# Patient Record
Sex: FEMALE | Race: ASIAN
[De-identification: names, ages, dates, MRNs, and addresses within clinical notes are randomized per-mention and may not be internally consistent; named-entity substitution may affect disease eponyms.]

---

## 2022-01-27 ENCOUNTER — TRANSCRIPTION ENCOUNTER (OUTPATIENT)
Age: 62
End: 2022-01-27

## 2022-03-10 ENCOUNTER — TRANSCRIPTION ENCOUNTER (OUTPATIENT)
Age: 62
End: 2022-03-10

## 2022-09-30 PROBLEM — Z00.00 ENCOUNTER FOR PREVENTIVE HEALTH EXAMINATION: Status: ACTIVE | Noted: 2022-09-30

## 2022-11-03 ENCOUNTER — APPOINTMENT (OUTPATIENT)
Dept: GASTROENTEROLOGY | Facility: CLINIC | Age: 62
End: 2022-11-03

## 2022-11-17 ENCOUNTER — APPOINTMENT (OUTPATIENT)
Dept: GASTROENTEROLOGY | Facility: CLINIC | Age: 62
End: 2022-11-17

## 2022-11-17 VITALS — WEIGHT: 156 LBS | BODY MASS INDEX: 27.64 KG/M2 | HEIGHT: 63 IN

## 2022-11-17 DIAGNOSIS — R14.3 FLATULENCE: ICD-10-CM

## 2022-11-17 PROCEDURE — 99204 OFFICE O/P NEW MOD 45 MIN: CPT

## 2022-11-17 NOTE — PHYSICAL EXAM
[Hearing Threshold Finger Rub Not [place]] : hearing was normal [Normal Lips/Gums] : the lips and gums were normal [Normal Appearance] : the appearance of the neck was normal [Heart Rate And Rhythm] : heart rate was normal and rhythm regular [Normal S1, S2] : normal S1 and S2 [Normal] : normal bowel sounds, non-tender, no masses, soft, no no hepato-splenomegaly [Abnormal Walk] : normal gait [Normal Color / Pigmentation] : normal skin color and pigmentation [Cranial Nerves Intact] : cranial nerves 2-12 were intact [No Focal Deficits] : no focal deficits [Oriented To Time, Place, And Person] : oriented to person, place, and time

## 2022-11-17 NOTE — HISTORY OF PRESENT ILLNESS
[FreeTextEntry1] : January 2022 Dr. Ospina: 2 mm polyps in the cecum, hepatic flexure, transverse, and sigmoid colon.  Diverticulosis.\par Pathology revealed tubular adenomas.

## 2022-11-17 NOTE — ASSESSMENT
[FreeTextEntry1] : 62 y.o F w/ HTN, DM, DL here for evaluation for dilated PD noted on MRI.\par \par #Dilated PD/PD stricture\par Concern for pancreatic mass\par -Check routine labs including CBC, CMP, INR\par -We will schedule EUS for further evaluation\par -If patient's labs suggest obstruction, may consider ERCP at the time\par \par #Bloating/belching -nonspecific symptoms\par Mild intermittent symptoms that are occasional\par Patient denied those symptoms at the time of the exam\par -We will further evaluate with EGD at the time of the procedure\par -Routine lab\par \par Follow-up after procedure

## 2022-12-04 LAB
ALBUMIN SERPL ELPH-MCNC: 4.9 G/DL
ALP BLD-CCNC: 89 U/L
ALT SERPL-CCNC: 41 U/L
ANION GAP SERPL CALC-SCNC: 14 MMOL/L
AST SERPL-CCNC: 24 U/L
BASOPHILS # BLD AUTO: 0.03 K/UL
BASOPHILS NFR BLD AUTO: 0.6 %
BILIRUB SERPL-MCNC: 0.6 MG/DL
BUN SERPL-MCNC: 13 MG/DL
CALCIUM SERPL-MCNC: 10.1 MG/DL
CHLORIDE SERPL-SCNC: 101 MMOL/L
CO2 SERPL-SCNC: 24 MMOL/L
CREAT SERPL-MCNC: 0.7 MG/DL
EGFR: 98 ML/MIN/1.73M2
EOSINOPHIL # BLD AUTO: 0.13 K/UL
EOSINOPHIL NFR BLD AUTO: 2.5 %
GLUCOSE SERPL-MCNC: 341 MG/DL
HCT VFR BLD CALC: 38.1 %
HGB BLD-MCNC: 13.2 G/DL
IMM GRANULOCYTES NFR BLD AUTO: 0.4 %
INR PPP: 0.94 RATIO
LYMPHOCYTES # BLD AUTO: 1.99 K/UL
LYMPHOCYTES NFR BLD AUTO: 38.9 %
MAN DIFF?: NORMAL
MCHC RBC-ENTMCNC: 31.4 PG
MCHC RBC-ENTMCNC: 34.6 G/DL
MCV RBC AUTO: 90.5 FL
MONOCYTES # BLD AUTO: 0.28 K/UL
MONOCYTES NFR BLD AUTO: 5.5 %
NEUTROPHILS # BLD AUTO: 2.67 K/UL
NEUTROPHILS NFR BLD AUTO: 52.1 %
PLATELET # BLD AUTO: 221 K/UL
POTASSIUM SERPL-SCNC: 3.9 MMOL/L
PROT SERPL-MCNC: 7.3 G/DL
PT BLD: 10.7 SEC
RBC # BLD: 4.21 M/UL
RBC # FLD: 11.9 %
SODIUM SERPL-SCNC: 139 MMOL/L
WBC # FLD AUTO: 5.12 K/UL

## 2022-12-10 ENCOUNTER — LABORATORY RESULT (OUTPATIENT)
Age: 62
End: 2022-12-10

## 2022-12-14 ENCOUNTER — TRANSCRIPTION ENCOUNTER (OUTPATIENT)
Age: 62
End: 2022-12-14

## 2022-12-23 ENCOUNTER — TRANSCRIPTION ENCOUNTER (OUTPATIENT)
Age: 62
End: 2022-12-23

## 2022-12-23 ENCOUNTER — RESULT REVIEW (OUTPATIENT)
Age: 62
End: 2022-12-23

## 2022-12-23 ENCOUNTER — OUTPATIENT (OUTPATIENT)
Dept: OUTPATIENT SERVICES | Facility: HOSPITAL | Age: 62
LOS: 1 days | Discharge: HOME | End: 2022-12-23
Payer: COMMERCIAL

## 2022-12-23 VITALS
HEART RATE: 71 BPM | SYSTOLIC BLOOD PRESSURE: 135 MMHG | TEMPERATURE: 98 F | DIASTOLIC BLOOD PRESSURE: 94 MMHG | RESPIRATION RATE: 18 BRPM | HEIGHT: 62.99 IN | WEIGHT: 139.99 LBS

## 2022-12-23 VITALS — OXYGEN SATURATION: 98 %

## 2022-12-23 PROCEDURE — 88312 SPECIAL STAINS GROUP 1: CPT | Mod: 26

## 2022-12-23 PROCEDURE — 88172 CYTP DX EVAL FNA 1ST EA SITE: CPT | Mod: 26

## 2022-12-23 PROCEDURE — 43239 EGD BIOPSY SINGLE/MULTIPLE: CPT | Mod: XU

## 2022-12-23 PROCEDURE — 88173 CYTOPATH EVAL FNA REPORT: CPT | Mod: 26

## 2022-12-23 PROCEDURE — 88305 TISSUE EXAM BY PATHOLOGIST: CPT | Mod: 26

## 2022-12-23 PROCEDURE — 43238 EGD US FINE NEEDLE BX/ASPIR: CPT

## 2022-12-23 NOTE — ASU DISCHARGE PLAN (ADULT/PEDIATRIC) - NS MD DC FALL RISK RISK
For information on Fall & Injury Prevention, visit: [email or web address] OR  [email or web address] OR  https://www.cdc.gov/steadi/patient.html

## 2022-12-23 NOTE — ASU PATIENT PROFILE, ADULT - FALL HARM RISK - UNIVERSAL INTERVENTIONS
Bed in lowest position, wheels locked, appropriate side rails in place/Call bell, personal items and telephone in reach/Instruct patient to call for assistance before getting out of bed or chair/Non-slip footwear when patient is out of bed/[place] to call system/Physically safe environment - no spills, clutter or unnecessary equipment/Purposeful Proactive Rounding/Room/bathroom lighting operational, light cord in reach

## 2022-12-23 NOTE — CHART NOTE - NSCHARTNOTEFT_GEN_A_CORE
PACU ANESTHESIA ADMISSION NOTE      Procedure:   Post op diagnosis:      ____  Intubated  TV:______       Rate: ______      FiO2: ______    _x___  Patent Airway    _x___  Full return of protective reflexes    _x___  Full recovery from anesthesia / back to baseline status    Vitals:    BP  123/56  P  82  R  14  Sat  99    Mental Status:  _x___ Awake   _____ Alert   _____ Drowsy   _____ Sedated    Nausea/Vomiting:  _x___  NO       ______Yes,   See Post - Op Orders         Pain Scale (0-10):  __0___    Treatment: _x___ None    ____ See Post - Op/PCA Orders    Post - Operative Fluids:   __x__ Oral   ____ See Post - Op Orders    Plan: Discharge:   _x___Home       _____Floor     _____Critical Care    _____  Other:_________________    Comments:  No anesthesia issues or complications noted.  Discharge when criteria met.

## 2022-12-28 LAB — SURGICAL PATHOLOGY STUDY: SIGNIFICANT CHANGE UP

## 2022-12-30 LAB — NON-GYNECOLOGICAL CYTOLOGY STUDY: SIGNIFICANT CHANGE UP

## 2023-01-03 DIAGNOSIS — Z79.84 LONG TERM (CURRENT) USE OF ORAL HYPOGLYCEMIC DRUGS: ICD-10-CM

## 2023-01-03 DIAGNOSIS — K86.89 OTHER SPECIFIED DISEASES OF PANCREAS: ICD-10-CM

## 2023-01-03 DIAGNOSIS — K29.50 UNSPECIFIED CHRONIC GASTRITIS WITHOUT BLEEDING: ICD-10-CM

## 2023-01-03 DIAGNOSIS — E78.00 PURE HYPERCHOLESTEROLEMIA, UNSPECIFIED: ICD-10-CM

## 2023-01-03 DIAGNOSIS — I10 ESSENTIAL (PRIMARY) HYPERTENSION: ICD-10-CM

## 2023-01-03 DIAGNOSIS — E11.9 TYPE 2 DIABETES MELLITUS WITHOUT COMPLICATIONS: ICD-10-CM

## 2023-01-26 ENCOUNTER — TRANSCRIPTION ENCOUNTER (OUTPATIENT)
Age: 63
End: 2023-01-26

## 2023-01-26 ENCOUNTER — APPOINTMENT (OUTPATIENT)
Dept: GASTROENTEROLOGY | Facility: CLINIC | Age: 63
End: 2023-01-26
Payer: COMMERCIAL

## 2023-01-26 PROCEDURE — 99213 OFFICE O/P EST LOW 20 MIN: CPT | Mod: 95

## 2023-01-28 NOTE — HISTORY OF PRESENT ILLNESS
[Home] : at home, [unfilled] , at the time of the visit. [Medical Office: ([facility])___] : at the medical office located in  [Verbal consent obtained from patient] : the patient, [unfilled] [FreeTextEntry4] : SANIA IBANEZ [FreeTextEntry1] : January 2022 Dr. Ospina: 2 mm polyps in the cecum, hepatic flexure, transverse, and sigmoid colon.  Diverticulosis.\par Pathology revealed tubular adenomas.

## 2023-01-28 NOTE — ASSESSMENT
[FreeTextEntry1] : 62 y.o F w/ HTN, DM, DL here for evaluation for dilated PD noted on MRI.\par \par She is status post EUS on 12/23/2022 revealing a solid lesion measuring around 10 x 8.2 mm at the pancreatic genu abutting the PD resulting in extrinsic compression and stricture with upstream PD dilation in the body and tail of pancreas.  There was a solid intraductal hyperechoic nodule measuring 6.4 x 6.4 mm.\par The lesion was sampled using a 22-gauge FNB and pathology showed atypical ductal epithelium.\par \par #Dilated PD/PD stricture\par #pancreatic lesion\par ddx include main duct intraductal pancreatic mucinous neoplasm (MD-IPMN) vs. pancreatic intraepithelial neoplasia (PanIN) vs less likely MCN\par \par pt has dilated PD to 4 mm w/ distal pancreatic atrophy and a mural nodule >5 mm. in addition, changes of PD caliber suggestive of PD involvement, as well atypical cytology findings are worrisome features and per Fukuoka criteria, would warrant surgical evaluation. \par \par -refer to surgery for further eval\par -advised about pre-malignant etiology and explained risks of progression alex given concern for MD-IPMN\par \par #Bloating/belching -nonspecific symptoms\par Mild intermittent symptoms that are occasional\par EGD was unremarkable\par Patient denied those symptoms today\par \par Follow-up after surgery eval

## 2023-01-28 NOTE — PHYSICAL EXAM
[Hearing Threshold Finger Rub Not [place]] : hearing was normal [Normal Lips/Gums] : the lips and gums were normal [Normal Appearance] : the appearance of the neck was normal [Heart Rate And Rhythm] : heart rate was normal and rhythm regular [Normal S1, S2] : normal S1 and S2 [Normal] : normal bowel sounds, non-tender, no masses, soft, no no hepato-splenomegaly [Abnormal Walk] : normal gait [Normal Color / Pigmentation] : normal skin color and pigmentation [Cranial Nerves Intact] : cranial nerves 2-12 were intact [No Focal Deficits] : no focal deficits [Oriented To Time, Place, And Person] : oriented to person, place, and time

## 2023-02-03 ENCOUNTER — APPOINTMENT (OUTPATIENT)
Dept: SURGERY | Facility: CLINIC | Age: 63
End: 2023-02-03
Payer: COMMERCIAL

## 2023-02-03 VITALS
BODY MASS INDEX: 27.29 KG/M2 | SYSTOLIC BLOOD PRESSURE: 130 MMHG | DIASTOLIC BLOOD PRESSURE: 90 MMHG | WEIGHT: 154 LBS | HEART RATE: 82 BPM | HEIGHT: 63 IN | OXYGEN SATURATION: 98 % | TEMPERATURE: 97.1 F

## 2023-02-03 PROCEDURE — 99204 OFFICE O/P NEW MOD 45 MIN: CPT

## 2023-02-03 NOTE — PHYSICAL EXAM
[JVD] : no jugular venous distention  [Purpura] : no purpura  [Alert] : alert [Calm] : calm [de-identified] : Normal  [de-identified] : Normal  [de-identified] : Normal

## 2023-02-03 NOTE — ASSESSMENT
[FreeTextEntry1] : Ms. JEREMIAS IVEY is a 62 year  year old woman. She presented to the office for the first time on 02/03/2023 , her primary is Doesnt have PCP .\par \par She had some pain, was worked up outpt in [place] and then had and EUS guided biopsy of the pancreas for a small mass in the the genu or the pancreas.\par the PD is dilated proximally \par \par We do not have have any imaging to review. Biopsy had atypical cells. \par \par impression: Main Duct IPMN - causing issues. \par \par Will get Pancreatic Protocol  CT \par Will schedule for PancreaticoDuodenectomy. \par \par We explained in great detail the pathophysiology of the disease process. We reyna diagrams and discussed the workup for diagnosis and management.\par The various options were explained to the patient. The Risk , benefit and alternatives were discussed. We discussed recovery and possible complications.\par The Post operative care was explained to the patient. She was counselled on diet , exercise and wound care.\par We discussed the pathology and surgery with her.\par \par We discussed for over 45 min . \par We discussed the importance of close follow up. \par We informed that she needs to follow up after CT. \par We also informed that she can call us if anything changes or has any questions.\par

## 2023-02-03 NOTE — HISTORY OF PRESENT ILLNESS
[de-identified] : Ms. JEREMIAS IVEY is a 62 year  year old woman. She presented to the office for the first time on 02/03/2023 , her primary is Doesnt have PCP .\par \par She had some pain, was worked up outpt in [place] and then had and EUS guided biopsy of the pancreas for a small mass in the the genu or the pancreas.\par the PD is dilated proximally \par We do not have have any imaging to review. Biopsy had atypical cells.

## 2023-02-07 ENCOUNTER — NON-APPOINTMENT (OUTPATIENT)
Age: 63
End: 2023-02-07

## 2023-02-16 ENCOUNTER — OUTPATIENT (OUTPATIENT)
Dept: OUTPATIENT SERVICES | Facility: HOSPITAL | Age: 63
LOS: 1 days | End: 2023-02-16
Payer: COMMERCIAL

## 2023-02-16 DIAGNOSIS — D49.0 NEOPLASM OF UNSPECIFIED BEHAVIOR OF DIGESTIVE SYSTEM: ICD-10-CM

## 2023-02-16 DIAGNOSIS — Z00.8 ENCOUNTER FOR OTHER GENERAL EXAMINATION: ICD-10-CM

## 2023-02-16 PROCEDURE — 74178 CT ABD&PLV WO CNTR FLWD CNTR: CPT

## 2023-02-16 PROCEDURE — 74178 CT ABD&PLV WO CNTR FLWD CNTR: CPT | Mod: 26

## 2023-02-17 DIAGNOSIS — D49.0 NEOPLASM OF UNSPECIFIED BEHAVIOR OF DIGESTIVE SYSTEM: ICD-10-CM

## 2023-02-27 ENCOUNTER — APPOINTMENT (OUTPATIENT)
Dept: SURGERY | Facility: CLINIC | Age: 63
End: 2023-02-27
Payer: COMMERCIAL

## 2023-02-27 PROCEDURE — 99215 OFFICE O/P EST HI 40 MIN: CPT

## 2023-02-27 NOTE — PHYSICAL EXAM
[Alert] : alert [Calm] : calm [JVD] : no jugular venous distention  [Purpura] : no purpura  [de-identified] : Normal  [de-identified] : Normal  [de-identified] : Normal

## 2023-02-27 NOTE — HISTORY OF PRESENT ILLNESS
[de-identified] : Ms. JEREMIAS IEVY is a 62 year  year old woman. She presented to the office for the first time on 02/03/2023 , her primary is Doesnt have PCP .\par \par She had some pain, was worked up outpt in [place] and then had and EUS guided biopsy of the pancreas for a small mass in the the genu or the pancreas.\par the PD is dilated proximally \par We do not have have any imaging to review. Biopsy had atypical cells. \par 2/27/23 ; pmd [facility] [id number]\par CT shows a smal complex mass in the head / uncinate as well as the neck \par sma smv looks good.

## 2023-02-27 NOTE — ASSESSMENT
[FreeTextEntry1] : Ms. JEREMIAS IVEY is a 62 year  year old woman. She presented to the office for the first time on 02/03/2023 , her primary is Doesnt have PCP .\par \par She had some pain, was worked up outpt in [place] and then had and EUS guided biopsy of the pancreas for a small mass in the the genu or the pancreas.\par the PD is dilated proximally \par We do not have have any imaging to review. Biopsy had atypical cells. \par 2/27/23 ; pmd [facility] [id number]\par CT shows a smal complex mass in the head / uncinate as well as the neck \par sma smv looks good. \par \par impression: Main Duct IPMN - causing issues. \par atypical cells\par \par Will schedule for PancreaticoDuodenectomy. \par \par We explained in great detail the pathophysiology of the disease process. We reyna diagrams and discussed the workup for diagnosis and management.\par The various options were explained to the patient. The Risk , benefit and alternatives were discussed. We discussed recovery and possible complications.\par The Post operative care was explained to the patient. She was counselled on diet , exercise and wound care.\par We discussed the pathology and surgery with her.\par \par We discussed for over 60 min . \par We discussed the importance of close follow up. \par We informed that she needs to follow up  in [place]\par We also informed that she can call us if anything changes or has any questions.\par

## 2023-03-13 ENCOUNTER — OUTPATIENT (OUTPATIENT)
Dept: OUTPATIENT SERVICES | Facility: HOSPITAL | Age: 63
LOS: 1 days | End: 2023-03-13
Payer: COMMERCIAL

## 2023-03-13 ENCOUNTER — RESULT REVIEW (OUTPATIENT)
Age: 63
End: 2023-03-13

## 2023-03-13 VITALS
HEART RATE: 75 BPM | OXYGEN SATURATION: 97 % | SYSTOLIC BLOOD PRESSURE: 158 MMHG | DIASTOLIC BLOOD PRESSURE: 76 MMHG | RESPIRATION RATE: 15 BRPM | HEIGHT: 62 IN | TEMPERATURE: 99 F | WEIGHT: 154.54 LBS

## 2023-03-13 DIAGNOSIS — K86.89 OTHER SPECIFIED DISEASES OF PANCREAS: ICD-10-CM

## 2023-03-13 DIAGNOSIS — Z01.818 ENCOUNTER FOR OTHER PREPROCEDURAL EXAMINATION: ICD-10-CM

## 2023-03-13 LAB
A1C WITH ESTIMATED AVERAGE GLUCOSE RESULT: 7.1 % — HIGH (ref 4–5.6)
ALBUMIN SERPL ELPH-MCNC: 4.8 G/DL — SIGNIFICANT CHANGE UP (ref 3.5–5.2)
ALP SERPL-CCNC: 79 U/L — SIGNIFICANT CHANGE UP (ref 30–115)
ALT FLD-CCNC: 52 U/L — HIGH (ref 0–41)
ANION GAP SERPL CALC-SCNC: 15 MMOL/L — HIGH (ref 7–14)
APTT BLD: 31.4 SEC — SIGNIFICANT CHANGE UP (ref 27–39.2)
AST SERPL-CCNC: 29 U/L — SIGNIFICANT CHANGE UP (ref 0–41)
BASOPHILS # BLD AUTO: 0.03 K/UL — SIGNIFICANT CHANGE UP (ref 0–0.2)
BASOPHILS NFR BLD AUTO: 0.6 % — SIGNIFICANT CHANGE UP (ref 0–1)
BILIRUB SERPL-MCNC: 0.9 MG/DL — SIGNIFICANT CHANGE UP (ref 0.2–1.2)
BLD GP AB SCN SERPL QL: SIGNIFICANT CHANGE UP
BUN SERPL-MCNC: 13 MG/DL — SIGNIFICANT CHANGE UP (ref 10–20)
CALCIUM SERPL-MCNC: 10 MG/DL — SIGNIFICANT CHANGE UP (ref 8.4–10.5)
CHLORIDE SERPL-SCNC: 106 MMOL/L — SIGNIFICANT CHANGE UP (ref 98–110)
CO2 SERPL-SCNC: 24 MMOL/L — SIGNIFICANT CHANGE UP (ref 17–32)
CREAT SERPL-MCNC: 0.7 MG/DL — SIGNIFICANT CHANGE UP (ref 0.7–1.5)
EGFR: 98 ML/MIN/1.73M2 — SIGNIFICANT CHANGE UP
EOSINOPHIL # BLD AUTO: 0.09 K/UL — SIGNIFICANT CHANGE UP (ref 0–0.7)
EOSINOPHIL NFR BLD AUTO: 1.8 % — SIGNIFICANT CHANGE UP (ref 0–8)
ESTIMATED AVERAGE GLUCOSE: 157 MG/DL — HIGH (ref 68–114)
GLUCOSE SERPL-MCNC: 128 MG/DL — HIGH (ref 70–99)
HCT VFR BLD CALC: 39.5 % — SIGNIFICANT CHANGE UP (ref 37–47)
HGB BLD-MCNC: 13.5 G/DL — SIGNIFICANT CHANGE UP (ref 12–16)
IMM GRANULOCYTES NFR BLD AUTO: 0.6 % — HIGH (ref 0.1–0.3)
INR BLD: 0.93 RATIO — SIGNIFICANT CHANGE UP (ref 0.65–1.3)
LYMPHOCYTES # BLD AUTO: 2.38 K/UL — SIGNIFICANT CHANGE UP (ref 1.2–3.4)
LYMPHOCYTES # BLD AUTO: 47.5 % — SIGNIFICANT CHANGE UP (ref 20.5–51.1)
MCHC RBC-ENTMCNC: 31 PG — SIGNIFICANT CHANGE UP (ref 27–31)
MCHC RBC-ENTMCNC: 34.2 G/DL — SIGNIFICANT CHANGE UP (ref 32–37)
MCV RBC AUTO: 90.6 FL — SIGNIFICANT CHANGE UP (ref 81–99)
MONOCYTES # BLD AUTO: 0.28 K/UL — SIGNIFICANT CHANGE UP (ref 0.1–0.6)
MONOCYTES NFR BLD AUTO: 5.6 % — SIGNIFICANT CHANGE UP (ref 1.7–9.3)
NEUTROPHILS # BLD AUTO: 2.2 K/UL — SIGNIFICANT CHANGE UP (ref 1.4–6.5)
NEUTROPHILS NFR BLD AUTO: 43.9 % — SIGNIFICANT CHANGE UP (ref 42.2–75.2)
NRBC # BLD: 0 /100 WBCS — SIGNIFICANT CHANGE UP (ref 0–0)
PLATELET # BLD AUTO: 251 K/UL — SIGNIFICANT CHANGE UP (ref 130–400)
POTASSIUM SERPL-MCNC: 4.6 MMOL/L — SIGNIFICANT CHANGE UP (ref 3.5–5)
POTASSIUM SERPL-SCNC: 4.6 MMOL/L — SIGNIFICANT CHANGE UP (ref 3.5–5)
PROT SERPL-MCNC: 7.6 G/DL — SIGNIFICANT CHANGE UP (ref 6–8)
PROTHROM AB SERPL-ACNC: 10.6 SEC — SIGNIFICANT CHANGE UP (ref 9.95–12.87)
RBC # BLD: 4.36 M/UL — SIGNIFICANT CHANGE UP (ref 4.2–5.4)
RBC # FLD: 11 % — LOW (ref 11.5–14.5)
SODIUM SERPL-SCNC: 145 MMOL/L — SIGNIFICANT CHANGE UP (ref 135–146)
WBC # BLD: 5.01 K/UL — SIGNIFICANT CHANGE UP (ref 4.8–10.8)
WBC # FLD AUTO: 5.01 K/UL — SIGNIFICANT CHANGE UP (ref 4.8–10.8)

## 2023-03-13 PROCEDURE — 93010 ELECTROCARDIOGRAM REPORT: CPT

## 2023-03-13 PROCEDURE — 85610 PROTHROMBIN TIME: CPT

## 2023-03-13 PROCEDURE — 99214 OFFICE O/P EST MOD 30 MIN: CPT | Mod: 25

## 2023-03-13 PROCEDURE — 71046 X-RAY EXAM CHEST 2 VIEWS: CPT | Mod: 26

## 2023-03-13 PROCEDURE — 93005 ELECTROCARDIOGRAM TRACING: CPT

## 2023-03-13 PROCEDURE — 36415 COLL VENOUS BLD VENIPUNCTURE: CPT

## 2023-03-13 PROCEDURE — 85025 COMPLETE CBC W/AUTO DIFF WBC: CPT

## 2023-03-13 PROCEDURE — 71046 X-RAY EXAM CHEST 2 VIEWS: CPT

## 2023-03-13 PROCEDURE — 86900 BLOOD TYPING SEROLOGIC ABO: CPT

## 2023-03-13 PROCEDURE — 83036 HEMOGLOBIN GLYCOSYLATED A1C: CPT

## 2023-03-13 PROCEDURE — 85730 THROMBOPLASTIN TIME PARTIAL: CPT

## 2023-03-13 PROCEDURE — 80053 COMPREHEN METABOLIC PANEL: CPT

## 2023-03-13 PROCEDURE — 86901 BLOOD TYPING SEROLOGIC RH(D): CPT

## 2023-03-13 PROCEDURE — 86850 RBC ANTIBODY SCREEN: CPT

## 2023-03-13 NOTE — H&P PST ADULT - HISTORY OF PRESENT ILLNESS
PT PRESENTS TO PAST WITH NO SOB, CP, PALPITATIONS, DYSURIA, UTI OR URI AT PRESENT.   PT ABLE TO WALK UP 2-3 FLIGHTS OF STEPS WITH NO SOB.  AS PER THE PT, THIS IS HIS/HER COMPLETE MEDICAL AND SURGICAL HX, INCLUDING MEDICATIONS PRESCRIBED AND OVER THE COUNTER  pt denies any covid s/s, YES   1/2023  tested positive in the past--PT IS AWARE OF DATE AND TIME OF COVID TESTING PRIOR TO DOP.  pt advised self quarantine till day of procedure  denies travel outside the [place] in the past 30 days  Anesthesia Alert  NO--Difficult Airway  NO--History of neck surgery or radiation  NO--Limited ROM of neck  NO--History of Malignant hyperthermia  NO--Personal or family history of Pseudocholinesterase deficiency  NO--Prior Anesthesia Complication  NO--Latex Allergy  NO--Loose teeth  NO--History of Rheumatoid Arthritis  NO--JUAN  NO BLEEDING RISK  NO--Other_____

## 2023-03-13 NOTE — H&P PST ADULT - REASON FOR ADMISSION
Proceduralist:  Karyn Blanco  Procedure: PANCREATICODUODENECTOMY AND ALL OTHER INDICATED PROCEDURE  Procedure: 480 Minutes  PT STATES--I HAVE A TUMOR IN MY PANCREASE.   THE PAIN IS 4/10.  THE PAIN COMES AND GOES. WHEN I TAKE ANTIBIOTICS THE PAIN GOES AWAY.  PT STATES-- I HAVE HAD NO PAIN FOR 6 MONTHS. Proceduralist:  Karyn Blanco  Procedure: PANCREATICODUODENECTOMY AND ALL OTHER INDICATED PROCEDURE  Procedure: 480 Minutes  PT STATES--I HAVE A TUMOR IN MY PANCREASE.   THE PAIN WAS  4/10. THE PAIN WAS A CRAMPING AND PRESSURE TYPE.    THE PAIN COMES AND GOES. WHEN I TAKE ANTIBIOTICS THE PAIN GOES AWAY.  PT STATES-- I HAVE HAD NO PAIN FOR 6 MONTHS.

## 2023-03-13 NOTE — H&P PST ADULT - NSICDXPASTMEDICALHX_GEN_ALL_CORE_FT
PAST MEDICAL HISTORY:  Diabetes     High cholesterol     HTN (hypertension)      PAST MEDICAL HISTORY:  COPD (chronic obstructive pulmonary disease)     Diabetes     High cholesterol     HTN (hypertension)     Kidney stones

## 2023-03-14 DIAGNOSIS — Z01.818 ENCOUNTER FOR OTHER PREPROCEDURAL EXAMINATION: ICD-10-CM

## 2023-03-14 DIAGNOSIS — K86.89 OTHER SPECIFIED DISEASES OF PANCREAS: ICD-10-CM

## 2023-03-31 ENCOUNTER — LABORATORY RESULT (OUTPATIENT)
Age: 63
End: 2023-03-31

## 2023-03-31 ENCOUNTER — NON-APPOINTMENT (OUTPATIENT)
Age: 63
End: 2023-03-31

## 2023-03-31 NOTE — ASU PATIENT PROFILE, ADULT - NSICDXPASTMEDICALHX_GEN_ALL_CORE_FT
PAST MEDICAL HISTORY:  COPD (chronic obstructive pulmonary disease)     Diabetes     High cholesterol     HTN (hypertension)     Kidney stones

## 2023-03-31 NOTE — ASU PATIENT PROFILE, ADULT - FALL HARM RISK - UNIVERSAL INTERVENTIONS
Bed in lowest position, wheels locked, appropriate side rails in place/Call bell, personal items and telephone in reach/Instruct patient to call for assistance before getting out of bed or chair/Non-slip footwear when patient is out of bed/[place] to call system/Physically safe environment - no spills, clutter or unnecessary equipment/Purposeful Proactive Rounding/Room/bathroom lighting operational, light cord in reach

## 2023-04-03 ENCOUNTER — APPOINTMENT (OUTPATIENT)
Dept: SURGERY | Facility: HOSPITAL | Age: 63
End: 2023-04-03

## 2023-04-03 ENCOUNTER — RESULT REVIEW (OUTPATIENT)
Age: 63
End: 2023-04-03

## 2023-04-03 ENCOUNTER — TRANSCRIPTION ENCOUNTER (OUTPATIENT)
Age: 63
End: 2023-04-03

## 2023-04-03 ENCOUNTER — INPATIENT (INPATIENT)
Facility: HOSPITAL | Age: 63
LOS: 4 days | Discharge: HOME HEALTH | DRG: 260 | End: 2023-04-08
Attending: SURGERY | Admitting: SURGERY
Payer: MEDICAID

## 2023-04-03 VITALS
OXYGEN SATURATION: 99 % | SYSTOLIC BLOOD PRESSURE: 135 MMHG | HEART RATE: 76 BPM | TEMPERATURE: 98 F | DIASTOLIC BLOOD PRESSURE: 78 MMHG | HEIGHT: 63 IN | RESPIRATION RATE: 15 BRPM | WEIGHT: 149.91 LBS

## 2023-04-03 DIAGNOSIS — K86.89 OTHER SPECIFIED DISEASES OF PANCREAS: ICD-10-CM

## 2023-04-03 LAB
ALBUMIN SERPL ELPH-MCNC: 4.2 G/DL — SIGNIFICANT CHANGE UP (ref 3.5–5.2)
ALP SERPL-CCNC: 62 U/L — SIGNIFICANT CHANGE UP (ref 30–115)
ALT FLD-CCNC: 63 U/L — HIGH (ref 0–41)
ANION GAP SERPL CALC-SCNC: 18 MMOL/L — HIGH (ref 7–14)
APTT BLD: 31.1 SEC — SIGNIFICANT CHANGE UP (ref 27–39.2)
AST SERPL-CCNC: 54 U/L — HIGH (ref 0–41)
BASOPHILS # BLD AUTO: 0.01 K/UL — SIGNIFICANT CHANGE UP (ref 0–0.2)
BASOPHILS NFR BLD AUTO: 0.1 % — SIGNIFICANT CHANGE UP (ref 0–1)
BILIRUB DIRECT SERPL-MCNC: 0.3 MG/DL — SIGNIFICANT CHANGE UP (ref 0–0.3)
BILIRUB INDIRECT FLD-MCNC: 0.8 MG/DL — SIGNIFICANT CHANGE UP (ref 0.2–1.2)
BILIRUB SERPL-MCNC: 1.1 MG/DL — SIGNIFICANT CHANGE UP (ref 0.2–1.2)
BUN SERPL-MCNC: 15 MG/DL — SIGNIFICANT CHANGE UP (ref 10–20)
CALCIUM SERPL-MCNC: 8.9 MG/DL — SIGNIFICANT CHANGE UP (ref 8.4–10.4)
CHLORIDE SERPL-SCNC: 103 MMOL/L — SIGNIFICANT CHANGE UP (ref 98–110)
CO2 SERPL-SCNC: 19 MMOL/L — SIGNIFICANT CHANGE UP (ref 17–32)
CREAT SERPL-MCNC: 0.9 MG/DL — SIGNIFICANT CHANGE UP (ref 0.7–1.5)
EGFR: 72 ML/MIN/1.73M2 — SIGNIFICANT CHANGE UP
EOSINOPHIL # BLD AUTO: 0 K/UL — SIGNIFICANT CHANGE UP (ref 0–0.7)
EOSINOPHIL NFR BLD AUTO: 0 % — SIGNIFICANT CHANGE UP (ref 0–8)
GLUCOSE BLDC GLUCOMTR-MCNC: 121 MG/DL — HIGH (ref 70–99)
GLUCOSE BLDC GLUCOMTR-MCNC: 167 MG/DL — HIGH (ref 70–99)
GLUCOSE BLDC GLUCOMTR-MCNC: 219 MG/DL — HIGH (ref 70–99)
GLUCOSE BLDC GLUCOMTR-MCNC: 222 MG/DL — HIGH (ref 70–99)
GLUCOSE BLDC GLUCOMTR-MCNC: 247 MG/DL — HIGH (ref 70–99)
GLUCOSE SERPL-MCNC: 245 MG/DL — HIGH (ref 70–99)
HCT VFR BLD CALC: 41.1 % — SIGNIFICANT CHANGE UP (ref 37–47)
HGB BLD-MCNC: 13.8 G/DL — SIGNIFICANT CHANGE UP (ref 12–16)
IMM GRANULOCYTES NFR BLD AUTO: 0.2 % — SIGNIFICANT CHANGE UP (ref 0.1–0.3)
INR BLD: 1.11 RATIO — SIGNIFICANT CHANGE UP (ref 0.65–1.3)
LACTATE SERPL-SCNC: 5.8 MMOL/L — CRITICAL HIGH (ref 0.7–2)
LYMPHOCYTES # BLD AUTO: 0.77 K/UL — LOW (ref 1.2–3.4)
LYMPHOCYTES # BLD AUTO: 8.1 % — LOW (ref 20.5–51.1)
MAGNESIUM SERPL-MCNC: 1.4 MG/DL — LOW (ref 1.8–2.4)
MCHC RBC-ENTMCNC: 31 PG — SIGNIFICANT CHANGE UP (ref 27–31)
MCHC RBC-ENTMCNC: 33.6 G/DL — SIGNIFICANT CHANGE UP (ref 32–37)
MCV RBC AUTO: 92.4 FL — SIGNIFICANT CHANGE UP (ref 81–99)
MONOCYTES # BLD AUTO: 0.56 K/UL — SIGNIFICANT CHANGE UP (ref 0.1–0.6)
MONOCYTES NFR BLD AUTO: 5.9 % — SIGNIFICANT CHANGE UP (ref 1.7–9.3)
NEUTROPHILS # BLD AUTO: 8.11 K/UL — HIGH (ref 1.4–6.5)
NEUTROPHILS NFR BLD AUTO: 85.7 % — HIGH (ref 42.2–75.2)
NRBC # BLD: 0 /100 WBCS — SIGNIFICANT CHANGE UP (ref 0–0)
PHOSPHATE SERPL-MCNC: 5.5 MG/DL — HIGH (ref 2.1–4.9)
PLATELET # BLD AUTO: 221 K/UL — SIGNIFICANT CHANGE UP (ref 130–400)
POTASSIUM SERPL-MCNC: 4.2 MMOL/L — SIGNIFICANT CHANGE UP (ref 3.5–5)
POTASSIUM SERPL-SCNC: 4.2 MMOL/L — SIGNIFICANT CHANGE UP (ref 3.5–5)
PROT SERPL-MCNC: 6.4 G/DL — SIGNIFICANT CHANGE UP (ref 6–8)
PROTHROM AB SERPL-ACNC: 12.7 SEC — SIGNIFICANT CHANGE UP (ref 9.95–12.87)
RBC # BLD: 4.45 M/UL — SIGNIFICANT CHANGE UP (ref 4.2–5.4)
RBC # FLD: 11.7 % — SIGNIFICANT CHANGE UP (ref 11.5–14.5)
SODIUM SERPL-SCNC: 140 MMOL/L — SIGNIFICANT CHANGE UP (ref 135–146)
WBC # BLD: 9.47 K/UL — SIGNIFICANT CHANGE UP (ref 4.8–10.8)
WBC # FLD AUTO: 9.47 K/UL — SIGNIFICANT CHANGE UP (ref 4.8–10.8)

## 2023-04-03 PROCEDURE — 36415 COLL VENOUS BLD VENIPUNCTURE: CPT

## 2023-04-03 PROCEDURE — 84484 ASSAY OF TROPONIN QUANT: CPT

## 2023-04-03 PROCEDURE — C9399: CPT

## 2023-04-03 PROCEDURE — C1889: CPT

## 2023-04-03 PROCEDURE — 88304 TISSUE EXAM BY PATHOLOGIST: CPT | Mod: 26

## 2023-04-03 PROCEDURE — 80076 HEPATIC FUNCTION PANEL: CPT

## 2023-04-03 PROCEDURE — 80048 BASIC METABOLIC PNL TOTAL CA: CPT

## 2023-04-03 PROCEDURE — 87075 CULTR BACTERIA EXCEPT BLOOD: CPT

## 2023-04-03 PROCEDURE — C2617: CPT

## 2023-04-03 PROCEDURE — 88304 TISSUE EXAM BY PATHOLOGIST: CPT

## 2023-04-03 PROCEDURE — 71045 X-RAY EXAM CHEST 1 VIEW: CPT

## 2023-04-03 PROCEDURE — 76998 US GUIDE INTRAOP: CPT | Mod: 26,59

## 2023-04-03 PROCEDURE — 93010 ELECTROCARDIOGRAM REPORT: CPT

## 2023-04-03 PROCEDURE — 87205 SMEAR GRAM STAIN: CPT

## 2023-04-03 PROCEDURE — 84100 ASSAY OF PHOSPHORUS: CPT

## 2023-04-03 PROCEDURE — 85610 PROTHROMBIN TIME: CPT

## 2023-04-03 PROCEDURE — 88309 TISSUE EXAM BY PATHOLOGIST: CPT | Mod: 26

## 2023-04-03 PROCEDURE — 82962 GLUCOSE BLOOD TEST: CPT

## 2023-04-03 PROCEDURE — 82150 ASSAY OF AMYLASE: CPT

## 2023-04-03 PROCEDURE — 93010 ELECTROCARDIOGRAM REPORT: CPT | Mod: 77

## 2023-04-03 PROCEDURE — 71045 X-RAY EXAM CHEST 1 VIEW: CPT | Mod: 26

## 2023-04-03 PROCEDURE — 93005 ELECTROCARDIOGRAM TRACING: CPT

## 2023-04-03 PROCEDURE — 82553 CREATINE MB FRACTION: CPT

## 2023-04-03 PROCEDURE — 97161 PT EVAL LOW COMPLEX 20 MIN: CPT | Mod: GP

## 2023-04-03 PROCEDURE — 48153 PANCREATECTOMY: CPT | Mod: 22

## 2023-04-03 PROCEDURE — 49999 UNLISTED PX ABD PERTM&OMN: CPT

## 2023-04-03 PROCEDURE — 48100 BIOPSY OF PANCREAS OPEN: CPT

## 2023-04-03 PROCEDURE — C9290: CPT

## 2023-04-03 PROCEDURE — 85025 COMPLETE CBC W/AUTO DIFF WBC: CPT

## 2023-04-03 PROCEDURE — 83735 ASSAY OF MAGNESIUM: CPT

## 2023-04-03 PROCEDURE — 88307 TISSUE EXAM BY PATHOLOGIST: CPT | Mod: 26

## 2023-04-03 PROCEDURE — 83690 ASSAY OF LIPASE: CPT

## 2023-04-03 PROCEDURE — 87070 CULTURE OTHR SPECIMN AEROBIC: CPT

## 2023-04-03 PROCEDURE — 88307 TISSUE EXAM BY PATHOLOGIST: CPT

## 2023-04-03 PROCEDURE — 85730 THROMBOPLASTIN TIME PARTIAL: CPT

## 2023-04-03 PROCEDURE — 83605 ASSAY OF LACTIC ACID: CPT

## 2023-04-03 PROCEDURE — 88309 TISSUE EXAM BY PATHOLOGIST: CPT

## 2023-04-03 RX ORDER — OXYCODONE HYDROCHLORIDE 5 MG/1
5 TABLET ORAL EVERY 6 HOURS
Refills: 0 | Status: DISCONTINUED | OUTPATIENT
Start: 2023-04-03 | End: 2023-04-08

## 2023-04-03 RX ORDER — DEXTROSE 50 % IN WATER 50 %
25 SYRINGE (ML) INTRAVENOUS ONCE
Refills: 0 | Status: DISCONTINUED | OUTPATIENT
Start: 2023-04-03 | End: 2023-04-08

## 2023-04-03 RX ORDER — INSULIN LISPRO 100/ML
VIAL (ML) SUBCUTANEOUS EVERY 4 HOURS
Refills: 0 | Status: DISCONTINUED | OUTPATIENT
Start: 2023-04-03 | End: 2023-04-04

## 2023-04-03 RX ORDER — DEXTROSE 50 % IN WATER 50 %
15 SYRINGE (ML) INTRAVENOUS ONCE
Refills: 0 | Status: DISCONTINUED | OUTPATIENT
Start: 2023-04-03 | End: 2023-04-03

## 2023-04-03 RX ORDER — LOSARTAN POTASSIUM 100 MG/1
100 TABLET, FILM COATED ORAL DAILY
Refills: 0 | Status: DISCONTINUED | OUTPATIENT
Start: 2023-04-03 | End: 2023-04-08

## 2023-04-03 RX ORDER — ACETAMINOPHEN 500 MG
650 TABLET ORAL EVERY 6 HOURS
Refills: 0 | Status: DISCONTINUED | OUTPATIENT
Start: 2023-04-03 | End: 2023-04-08

## 2023-04-03 RX ORDER — SODIUM CHLORIDE 9 MG/ML
1000 INJECTION, SOLUTION INTRAVENOUS
Refills: 0 | Status: DISCONTINUED | OUTPATIENT
Start: 2023-04-03 | End: 2023-04-03

## 2023-04-03 RX ORDER — HEPARIN SODIUM 5000 [USP'U]/ML
5000 INJECTION INTRAVENOUS; SUBCUTANEOUS ONCE
Refills: 0 | Status: COMPLETED | OUTPATIENT
Start: 2023-04-03 | End: 2023-04-03

## 2023-04-03 RX ORDER — INSULIN LISPRO 100/ML
VIAL (ML) SUBCUTANEOUS
Refills: 0 | Status: DISCONTINUED | OUTPATIENT
Start: 2023-04-03 | End: 2023-04-03

## 2023-04-03 RX ORDER — GABAPENTIN 400 MG/1
100 CAPSULE ORAL EVERY 8 HOURS
Refills: 0 | Status: DISCONTINUED | OUTPATIENT
Start: 2023-04-03 | End: 2023-04-06

## 2023-04-03 RX ORDER — GLUCAGON INJECTION, SOLUTION 0.5 MG/.1ML
1 INJECTION, SOLUTION SUBCUTANEOUS ONCE
Refills: 0 | Status: DISCONTINUED | OUTPATIENT
Start: 2023-04-03 | End: 2023-04-03

## 2023-04-03 RX ORDER — AMPICILLIN SODIUM AND SULBACTAM SODIUM 250; 125 MG/ML; MG/ML
3 INJECTION, POWDER, FOR SUSPENSION INTRAMUSCULAR; INTRAVENOUS EVERY 6 HOURS
Refills: 0 | Status: DISCONTINUED | OUTPATIENT
Start: 2023-04-04 | End: 2023-04-08

## 2023-04-03 RX ORDER — ENOXAPARIN SODIUM 100 MG/ML
40 INJECTION SUBCUTANEOUS EVERY 24 HOURS
Refills: 0 | Status: DISCONTINUED | OUTPATIENT
Start: 2023-04-03 | End: 2023-04-08

## 2023-04-03 RX ORDER — AMPICILLIN SODIUM AND SULBACTAM SODIUM 250; 125 MG/ML; MG/ML
INJECTION, POWDER, FOR SUSPENSION INTRAMUSCULAR; INTRAVENOUS
Refills: 0 | Status: DISCONTINUED | OUTPATIENT
Start: 2023-04-03 | End: 2023-04-08

## 2023-04-03 RX ORDER — MORPHINE SULFATE 50 MG/1
4 CAPSULE, EXTENDED RELEASE ORAL
Refills: 0 | Status: DISCONTINUED | OUTPATIENT
Start: 2023-04-03 | End: 2023-04-03

## 2023-04-03 RX ORDER — KETOROLAC TROMETHAMINE 30 MG/ML
15 SYRINGE (ML) INJECTION ONCE
Refills: 0 | Status: DISCONTINUED | OUTPATIENT
Start: 2023-04-03 | End: 2023-04-03

## 2023-04-03 RX ORDER — ATORVASTATIN CALCIUM 80 MG/1
20 TABLET, FILM COATED ORAL AT BEDTIME
Refills: 0 | Status: DISCONTINUED | OUTPATIENT
Start: 2023-04-03 | End: 2023-04-08

## 2023-04-03 RX ORDER — DEXTROSE 50 % IN WATER 50 %
12.5 SYRINGE (ML) INTRAVENOUS ONCE
Refills: 0 | Status: DISCONTINUED | OUTPATIENT
Start: 2023-04-03 | End: 2023-04-03

## 2023-04-03 RX ORDER — SODIUM CHLORIDE 9 MG/ML
500 INJECTION, SOLUTION INTRAVENOUS ONCE
Refills: 0 | Status: COMPLETED | OUTPATIENT
Start: 2023-04-03 | End: 2023-04-03

## 2023-04-03 RX ORDER — AMPICILLIN SODIUM AND SULBACTAM SODIUM 250; 125 MG/ML; MG/ML
3 INJECTION, POWDER, FOR SUSPENSION INTRAMUSCULAR; INTRAVENOUS ONCE
Refills: 0 | Status: COMPLETED | OUTPATIENT
Start: 2023-04-03 | End: 2023-04-03

## 2023-04-03 RX ORDER — KETOROLAC TROMETHAMINE 30 MG/ML
15 SYRINGE (ML) INJECTION EVERY 6 HOURS
Refills: 0 | Status: DISCONTINUED | OUTPATIENT
Start: 2023-04-03 | End: 2023-04-05

## 2023-04-03 RX ORDER — DEXTROSE 50 % IN WATER 50 %
25 SYRINGE (ML) INTRAVENOUS ONCE
Refills: 0 | Status: DISCONTINUED | OUTPATIENT
Start: 2023-04-03 | End: 2023-04-03

## 2023-04-03 RX ORDER — SODIUM CHLORIDE 9 MG/ML
1000 INJECTION, SOLUTION INTRAVENOUS
Refills: 0 | Status: DISCONTINUED | OUTPATIENT
Start: 2023-04-03 | End: 2023-04-05

## 2023-04-03 RX ADMIN — ENOXAPARIN SODIUM 40 MILLIGRAM(S): 100 INJECTION SUBCUTANEOUS at 17:24

## 2023-04-03 RX ADMIN — Medication 650 MILLIGRAM(S): at 23:40

## 2023-04-03 RX ADMIN — OXYCODONE HYDROCHLORIDE 5 MILLIGRAM(S): 5 TABLET ORAL at 18:23

## 2023-04-03 RX ADMIN — AMPICILLIN SODIUM AND SULBACTAM SODIUM 200 GRAM(S): 250; 125 INJECTION, POWDER, FOR SUSPENSION INTRAMUSCULAR; INTRAVENOUS at 23:40

## 2023-04-03 RX ADMIN — AMPICILLIN SODIUM AND SULBACTAM SODIUM 200 GRAM(S): 250; 125 INJECTION, POWDER, FOR SUSPENSION INTRAMUSCULAR; INTRAVENOUS at 21:42

## 2023-04-03 RX ADMIN — Medication 4: at 18:12

## 2023-04-03 RX ADMIN — OXYCODONE HYDROCHLORIDE 5 MILLIGRAM(S): 5 TABLET ORAL at 17:25

## 2023-04-03 RX ADMIN — Medication 5: at 22:13

## 2023-04-03 RX ADMIN — SODIUM CHLORIDE 110 MILLILITER(S): 9 INJECTION, SOLUTION INTRAVENOUS at 18:46

## 2023-04-03 RX ADMIN — SODIUM CHLORIDE 500 MILLILITER(S): 9 INJECTION, SOLUTION INTRAVENOUS at 22:16

## 2023-04-03 RX ADMIN — ATORVASTATIN CALCIUM 20 MILLIGRAM(S): 80 TABLET, FILM COATED ORAL at 21:42

## 2023-04-03 RX ADMIN — GABAPENTIN 100 MILLIGRAM(S): 400 CAPSULE ORAL at 21:45

## 2023-04-03 RX ADMIN — HEPARIN SODIUM 5000 UNIT(S): 5000 INJECTION INTRAVENOUS; SUBCUTANEOUS at 07:19

## 2023-04-03 RX ADMIN — Medication 650 MILLIGRAM(S): at 18:23

## 2023-04-03 RX ADMIN — SODIUM CHLORIDE 75 MILLILITER(S): 9 INJECTION, SOLUTION INTRAVENOUS at 15:58

## 2023-04-03 RX ADMIN — Medication 15 MILLIGRAM(S): at 23:40

## 2023-04-03 RX ADMIN — Medication 650 MILLIGRAM(S): at 17:24

## 2023-04-03 NOTE — BRIEF OPERATIVE NOTE - OPERATION/FINDINGS
Pancreaticoduodenectomy for pancreatic head mass. 5Fr pancreatic stent across the pancreaticojejunostomy, 5Fr stent across the choledochojejunostomy. Pylorus-preserving duodenojejunostomy. JANICE drains left under the pancreaticoduodenectomy and choledochojejunostomy. Hemostasis achieved.

## 2023-04-03 NOTE — CONSULT NOTE ADULT - ASSESSMENT
Assessment & Plan    63F with PMHx of HTN, HLD, DM and S/p Pylorus Preserving Whipple for IPMN.       NEURO:  #Acute pain    -APAP prn, Gabapentin 100mg q8    -if intubated Fentanyl 12.5 mcg q4 prn    -Inpatient Rx: acetaminophen     Tablet .. 650 milliGRAM(s) Oral every 6 hours  gabapentin 100 milliGRAM(s) Oral every 8 hours  ketorolac   Injectable 15 milliGRAM(s) IV Push once PRN  morphine  - Injectable 4 milliGRAM(s) IV Push every 10 minutes PRN  oxyCODONE    IR 5 milliGRAM(s) Oral every 6 hours PRN      RESP:   #Oxygenation    -wean off NC to RA as tolerated  #Activity    -increase as tolerated    - encourage IS    CARDS:   #Hypertension  F/u Labs:   Rx-losartan 100 daily  Home Rx - Lipitor 20 daily, Benicar     GI/NUTR:   #Diet, NPO  Diet, NPO:   Except Medications     Special Instructions for Nursing:  Except Medications (04-03-23 @ 15:40) [Active]    -aspiration precautions, HOB 30  #GI Prophylaxis    -not indicated  #Bowel regimen    -senna/miralax    -last bowel movement      /RENAL:   #urine output in critically ill    -indwelling harper (placed    F/u Labs:          BUN/Cr-          Electrolytes-  #maintain euvolemia        HEME/ONC:   #DVT prophylaxis    -enoxaparin Injectable  , SCDs   F/u Labs: Hb/Hct:                       Plts:                  PTT/INR:        Home Rx:   T&S Expires:   Blood Consent-obtain if acute anemia, q6 CBC    ID:  #leukocytosis   f/u labs  Temp trend- 24hrs T(F): 98.5 (04-03 @ 06:35), Max: 98.5 (04-03 @ 06:35)      ENDO:    -FSG q6 if NPO or Tube feeds    -Glucose goal 140-180    -if above 180 start ISS    - Home Rx: Metformin 850    MSK:     Activity - Ambulate as Tolerated:     Time/Priority:  Routine (04-03-23 @ 15:40)      HOME Medications:      LINES/DRAINS:  PIV, Harper, L midline    ADVANCED DIRECTIVES:  Full Code    HCP/Emergency Contact-    INDICATION FOR SICU/SDU: Other pancreatic disorder             DISPO:   Case discussed with attending Dr. Benson Assessment & Plan  63F with PMHx of HTN, HLD, DM and S/p Pylorus Preserving Whipple for IPMN.       NEURO:  #Acute pain  -APAP prn, Gabapentin 100mg q8, oxycodone 5mg q6 prn     RESP:   #Oxygenation    -wean off NC to RA as tolerated  #Activity    -increase as tolerated    - encourage IS    CARDS:   #Hypertension  F/u Labs:   Rx-losartan 100 daily  Home Rx - Lipitor 20 daily, Benicar     GI/NUTR:   #Diet, NPO  Diet, NPO:   Except Medications     Special Instructions for Nursing:  Except Medications (04-03-23 @ 15:40) [Active]    -aspiration precautions, HOB 30  #GI Prophylaxis    -not indicated  #Bowel regimen    -senna/miralax    -last bowel movement      /RENAL:   #urine output in critically ill    -indwelling harper (placed    F/u Labs:          BUN/Cr-          Electrolytes-  #maintain euvolemia        HEME/ONC:   #DVT prophylaxis    -enoxaparin Injectable  , SCDs   F/u Labs: Hb/Hct:                       Plts:                  PTT/INR:        Home Rx:   T&S Expires:   Blood Consent-obtain if acute anemia, q6 CBC    ID:  #leukocytosis   f/u labs  Temp trend- 24hrs T(F): 98.5 (04-03 @ 06:35), Max: 98.5 (04-03 @ 06:35)      ENDO:    -FSG q6 if NPO or Tube feeds    -Glucose goal 140-180    -if above 180 start ISS    - Home Rx: Metformin 850    MSK:     Activity - Ambulate as Tolerated:     Time/Priority:  Routine (04-03-23 @ 15:40)      HOME Medications:      LINES/DRAINS:  PIV, [place], L midline    ADVANCED DIRECTIVES:  Full Code    HCP/Emergency Contact-    INDICATION FOR SICU/SDU: Other pancreatic disorder             DISPO:   Case discussed with attending Dr. Benson Assessment & Plan  63F with PMHx of HTN, HLD, DM and S/p Pylorus Preserving Whipple for IPMN.     NEURO:  #Acute pain  -APAP prn, Gabapentin 100mg q8, oxycodone 5mg q6 prn     RESP:   #Oxygenation    -Saturating well on 2L NC   #Activity    -increase as tolerated    - encourage IS    CARDS:   #h/o HTN, HLD  - continue home lipitor   - patient takes olmesartan 40mg qD at home > continue losartan while inpatient   - maintain normotension   - post op EKG: NSR, qtc 476  - pending post op cardiac enzymes   #elevated lactate post op- 5.8  - 500 cc bolus > repeat at 2330   - IVF resuscitation prn       GI/NUTR:   #s/p pylorus sparing whipple  - Diet, NPO: Except Medications  - aspiration precautions, HOB 30  - monitor JANICE drain output   #GI Prophylaxis    -not indicated  #Bowel regimen- holding immediately post operatively     /RENAL:   #urine output in critically ill    -indwelling harper (placed   - f/u electrolytes  - can remove harper at midnight per primary team if hemodynamically stable   #maintain euvolemia  - LR @ 110        HEME/ONC:   #DVT prophylaxis    -enoxaparin Injectable, SCDs    Labs: Hb/Hct:  13.8/41.1  -->                      Plts:  221  -->                 PTT/INR:  31.1/1.11  --->       ID:  WBC- 9.47  --->>  Temp trend- 24hrs T(F): 98.2 (04-03 @ 18:30), Max: 98.5 (04-03 @ 06:35)  Antibiotics-ampicillin/sulbactam  IVPB    ampicillin/sulbactam  IVPB 3 once        ENDO:  #h/o DM    -FSG q4 if NPO   - Glucose goal 140-180  - tightened ISS      MSK:     Activity - Ambulate as Tolerated:     Time/Priority:  Routine (04-03-23 @ 15:40)      LINES/DRAINS:  PIV, [place], L midline    ADVANCED DIRECTIVES:  Full Code    HCP/Emergency Contact-    INDICATION FOR SICU/SDU: Other pancreatic disorder      DISPO:   SDU, case discussed with attending Dr. Benson    Signed out to Dr. Amato on 4/3/23, at 17:30    Follow up:  - Cardiac enzymes and electrolytes  - JANICE drain outputs  - Removing harper after midnight   - monitor glucoses   - repeat lactate

## 2023-04-03 NOTE — PRE-ANESTHESIA EVALUATION ADULT - NSANTHBPHIGHRD_ENT_A_CORE

## 2023-04-03 NOTE — CONSULT NOTE ADULT - CRITICAL CARE ATTENDING COMMENT
Critical Care: [id number]   This patient has a high probability of sudden, clinically significant deterioration, which requires the highest level of physician preparedness to intervene urgently. I managed/supervised life or organ supporting interventions that required frequent physician assessment. I devoted my full attention in the ICU to the direct care of this patient for the period of time indicated below. Time I spent with the family or surrogate(s) is included only if the patient was incapable of providing the necessary information or participating in medical decision making. Time devoted to teaching and to any procedures I billed separately is not included.     UCHEJEREMIAS 63y Female with SICU consult after pylorus-preserving whipple   Patient is examined and evaluated at the bedside with SICU team. Treatment plan discussed with SICU team, nurses and primary team.   Imaging reviewed    pain controlled with current regimen  HD stable off pressors  wean Oxygen as tolerated  PT/OT  PPX per primary team    patient stable for admission to SDU    I saw and evaluated the patient personally. I have reviewed and agree with note above. Treatment plan discussed with SICU team, nurses and primary team at the time of the multidisciplinary rounds. The above note is NOT written at the time of rounds and will reflect all changes throughout management of the patient for the day note is written for.    Sha Benson MD, [email or web address].[email or web address]

## 2023-04-03 NOTE — CONSULT NOTE ADULT - SUBJECTIVE AND OBJECTIVE BOX
JEREMIAS IVEY   [id number]   [birth year]  63yF    Admit Date: 04-03-23  Indication for SDU/SICU:  OR #1-        ============================  HPI         OR Stats  OR Time: 8hrs               IV Fluids: 4L                    EBL:  400cc                     Blood Products: None                    UOP: 300cc via harper          24 Hour Events  -Admission under SICU service    [X] A ten-point review of systems was otherwise negative except as noted above.  [  ] Due to altered mental status/intubation, subjective information was not attained from the patient. History was obtained, to the extent possible, from review of the chart and collateral sources of information.    =========================================================================================================================================    PMH  PAST MEDICAL & SURGICAL HISTORY:  HTN (hypertension)  High cholesterol  Diabetes  COPD (chronic obstructive pulmonary disease)  Kidney stones  No significant past surgical history    Home Meds:  Home Medications:  Benicar: orally once a day (03 Apr 2023 06:33)  Lipitor: orally once a day (03 Apr 2023 06:33)  metFORMIN:  (03 Apr 2023 06:33)     Allergies  Allergies    No Known Allergies    Intolerances       Current Medications:  acetaminophen     Tablet .. 650 milliGRAM(s) Oral every 6 hours  atorvastatin 20 milliGRAM(s) Oral at bedtime  dextrose 5%. 1000 milliLiter(s) (100 mL/Hr) IV Continuous <Continuous>  dextrose 5%. 1000 milliLiter(s) (50 mL/Hr) IV Continuous <Continuous>  dextrose 50% Injectable 25 Gram(s) IV Push once  dextrose 50% Injectable 12.5 Gram(s) IV Push once  dextrose 50% Injectable 25 Gram(s) IV Push once  dextrose Oral Gel 15 Gram(s) Oral once PRN Blood Glucose LESS THAN 70 milliGRAM(s)/deciliter  enoxaparin Injectable 40 milliGRAM(s) SubCutaneous every 24 hours  gabapentin 100 milliGRAM(s) Oral every 8 hours  glucagon  Injectable 1 milliGRAM(s) IntraMuscular once  insulin lispro (ADMELOG) corrective regimen sliding scale   SubCutaneous three times a day before meals  ketorolac   Injectable 15 milliGRAM(s) IV Push once PRN Mild Pain (1 - 3)  lactated ringers. 1000 milliLiter(s) (110 mL/Hr) IV Continuous <Continuous>  lactated ringers. 1000 milliLiter(s) (75 mL/Hr) IV Continuous <Continuous>  losartan 100 milliGRAM(s) Oral daily  morphine  - Injectable 4 milliGRAM(s) IV Push every 10 minutes PRN Severe Pain (7 - 10)  oxyCODONE    IR 5 milliGRAM(s) Oral every 6 hours PRN Moderate Pain (4 - 6)      VITAL SIGNS, INS/OUTS (Last 24Hours)  ICU Vital Signs Last 24 Hrs  T(C): 36.9 (03 Apr 2023 07:15), Max: 36.9 (03 Apr 2023 06:35)  T(F): 98.5 (03 Apr 2023 06:35), Max: 98.5 (03 Apr 2023 06:35)  HR: 76 (03 Apr 2023 07:15) (76 - 76)  BP: 135/78 (03 Apr 2023 07:15) (135/78 - 135/78)  BP(mean): --  ABP: --  ABP(mean): --  RR: 15 (03 Apr 2023 07:15) (15 - 15)  SpO2: 99% (03 Apr 2023 07:15) (99% - 99%)    O2 Parameters below as of 03 Apr 2023 06:35  Patient On (Oxygen Delivery Method): room air          I&O's Summary      Physical Exam:   ----------------------------------------------------------------------------------------------------------  GCS:      Exam: A&Ox3, no focal deficits    RESPIRATORY:  Normal expansion/effort  Mechanical Ventilation    CARDIOVASCULAR:   S1/S2.  RRR  No peripheral edema    GASTROINTESTINAL:  Abdomen soft, non-tender, non-distended    MUSCULOSKELETAL:  Extremities warm, pink, well-perfused.    DERM:  No skin breakdown     :   Exam: Harper catheter in place.     Tubes/Lines/Drains   ----------------------------------------------------------------------------------------------------------  [x] Peripheral IV  [ ] Urinary Catheter Harper                                               [ ] Central Venous Line                   [ ] Arterial Line		       JEREMIAS IVEY   [id number]   [birth year]  63yF    Admit Date: 04-03-23  Indication for SDU/SICU:  OR #1-        ============================  HPI     63F with a PMHx of HTN, HLD, and DM s/p  elective Pylorus Preserving Whipple. Pt had an outpatient CT scan which showed an incidental finding for pancreatic duct dilation measuring 7mm, with abrupt transition to normal caliber pancreatic duct at the level of the pancreatic neck/genu, with suspicion of a 1 cm obstructing hypoattenuating nodule; additional subcentimeter hypodensities within the pancreatic head and uncinate process . GI was consulted and performed an EUS with FNA of the cystic lesion which showed IPMN. The case was uncomplicated and 5Fr pancreatic stent across the pancreaticojejunostomy, 5Fr stent across the choledochojejunostomy. Pylorus-preserving duodenojejunostomy. JANICE drains left under the pancreaticoduodenectomy and choledochojejunostomy.    OR Stats  OR Time: 8hrs               IV Fluids: 4L                    EBL:  400cc                     Blood Products: None                    UOP: 300cc via harper          24 Hour Events  -Admission under SICU service    [X] A ten-point review of systems was otherwise negative except as noted above.  [  ] Due to altered mental status/intubation, subjective information was not attained from the patient. History was obtained, to the extent possible, from review of the chart and collateral sources of information.    =========================================================================================================================================    PMH  PAST MEDICAL & SURGICAL HISTORY:  HTN (hypertension)  High cholesterol  Diabetes  COPD (chronic obstructive pulmonary disease)  Kidney stones  No significant past surgical history    Home Meds:  Home Medications:  Benicar: orally once a day (03 Apr 2023 06:33)  Lipitor: orally once a day (03 Apr 2023 06:33)  metFORMIN:  (03 Apr 2023 06:33)     Allergies  Allergies    No Known Allergies    Intolerances       Current Medications:  acetaminophen     Tablet .. 650 milliGRAM(s) Oral every 6 hours  atorvastatin 20 milliGRAM(s) Oral at bedtime  dextrose 5%. 1000 milliLiter(s) (100 mL/Hr) IV Continuous <Continuous>  dextrose 5%. 1000 milliLiter(s) (50 mL/Hr) IV Continuous <Continuous>  dextrose 50% Injectable 25 Gram(s) IV Push once  dextrose 50% Injectable 12.5 Gram(s) IV Push once  dextrose 50% Injectable 25 Gram(s) IV Push once  dextrose Oral Gel 15 Gram(s) Oral once PRN Blood Glucose LESS THAN 70 milliGRAM(s)/deciliter  enoxaparin Injectable 40 milliGRAM(s) SubCutaneous every 24 hours  gabapentin 100 milliGRAM(s) Oral every 8 hours  glucagon  Injectable 1 milliGRAM(s) IntraMuscular once  insulin lispro (ADMELOG) corrective regimen sliding scale   SubCutaneous three times a day before meals  ketorolac   Injectable 15 milliGRAM(s) IV Push once PRN Mild Pain (1 - 3)  lactated ringers. 1000 milliLiter(s) (110 mL/Hr) IV Continuous <Continuous>  lactated ringers. 1000 milliLiter(s) (75 mL/Hr) IV Continuous <Continuous>  losartan 100 milliGRAM(s) Oral daily  morphine  - Injectable 4 milliGRAM(s) IV Push every 10 minutes PRN Severe Pain (7 - 10)  oxyCODONE    IR 5 milliGRAM(s) Oral every 6 hours PRN Moderate Pain (4 - 6)      VITAL SIGNS, INS/OUTS (Last 24Hours)  ICU Vital Signs Last 24 Hrs  T(C): 36.9 (03 Apr 2023 07:15), Max: 36.9 (03 Apr 2023 06:35)  T(F): 98.5 (03 Apr 2023 06:35), Max: 98.5 (03 Apr 2023 06:35)  HR: 76 (03 Apr 2023 07:15) (76 - 76)  BP: 135/78 (03 Apr 2023 07:15) (135/78 - 135/78)  BP(mean): --  ABP: --  ABP(mean): --  RR: 15 (03 Apr 2023 07:15) (15 - 15)  SpO2: 99% (03 Apr 2023 07:15) (99% - 99%)    O2 Parameters below as of 03 Apr 2023 06:35  Patient On (Oxygen Delivery Method): room air          I&O's Summary      Physical Exam:   ----------------------------------------------------------------------------------------------------------  GCS:  15    Exam: A&Ox3, no focal deficits, able to move all extremities    RESPIRATORY:  Normal expansion/effort    CARDIOVASCULAR:   S1/S2.  RRR  No peripheral edema    GASTROINTESTINAL:  Abdomen soft, non-tender, non-distended. Prevenar in place to suction    MUSCULOSKELETAL:  Extremities warm, pink, well-perfused.    DERM:  No skin breakdown     : Harper catheter in place.     Tubes/Lines/Drains   ----------------------------------------------------------------------------------------------------------  [x] Peripheral IV  [x] Urinary Catheter Harper  [x] L Midline                                              [ ] Central Venous Line                   [ ] Arterial Line		       JEREMIAS IVEY   [id number]   [birth year]  63yF    Admit Date: 04-03-23  Indication for SDU/SICU:  OR #1-        ============================  HPI     63F with a PMHx of HTN, HLD, and DM s/p  elective Pylorus Preserving Whipple. Pt had an outpatient CT scan which showed an incidental finding for pancreatic duct dilation measuring 7mm, with abrupt transition to normal caliber pancreatic duct at the level of the pancreatic neck/genu, with suspicion of a 1 cm obstructing hypoattenuating nodule; additional subcentimeter hypodensities within the pancreatic head and uncinate process . GI was consulted and performed an EUS with FNA of the cystic lesion which showed IPMN. The case was uncomplicated and 5Fr pancreatic stent across the pancreaticojejunostomy, 5Fr stent across the choledochojejunostomy. Pylorus-preserving duodenojejunostomy. JANICE drains left under the pancreaticoduodenectomy and choledochojejunostomy.    OR Stats  OR Time: 8hrs               IV Fluids: 4L                    EBL:  400cc                     Blood Products: None                    UOP: 300cc via harper          24 Hour Events  -Admission under SICU service    [X] A ten-point review of systems was otherwise negative except as noted above.  [  ] Due to altered mental status/intubation, subjective information was not attained from the patient. History was obtained, to the extent possible, from review of the chart and collateral sources of information.    =========================================================================================================================================    PMH  PAST MEDICAL & SURGICAL HISTORY:  HTN (hypertension)  High cholesterol  Diabetes  COPD (chronic obstructive pulmonary disease)  Kidney stones  No significant past surgical history    Home Meds:  Home Medications:  Benicar: orally once a day (03 Apr 2023 06:33)  Lipitor: orally once a day (03 Apr 2023 06:33)  metFORMIN:  (03 Apr 2023 06:33)     Allergies  Allergies    No Known Allergies    Intolerances       Current Medications:  acetaminophen     Tablet .. 650 milliGRAM(s) Oral every 6 hours  atorvastatin 20 milliGRAM(s) Oral at bedtime  dextrose 5%. 1000 milliLiter(s) (100 mL/Hr) IV Continuous <Continuous>  dextrose 5%. 1000 milliLiter(s) (50 mL/Hr) IV Continuous <Continuous>  dextrose 50% Injectable 25 Gram(s) IV Push once  dextrose 50% Injectable 12.5 Gram(s) IV Push once  dextrose 50% Injectable 25 Gram(s) IV Push once  dextrose Oral Gel 15 Gram(s) Oral once PRN Blood Glucose LESS THAN 70 milliGRAM(s)/deciliter  enoxaparin Injectable 40 milliGRAM(s) SubCutaneous every 24 hours  gabapentin 100 milliGRAM(s) Oral every 8 hours  glucagon  Injectable 1 milliGRAM(s) IntraMuscular once  insulin lispro (ADMELOG) corrective regimen sliding scale   SubCutaneous three times a day before meals  ketorolac   Injectable 15 milliGRAM(s) IV Push once PRN Mild Pain (1 - 3)  lactated ringers. 1000 milliLiter(s) (110 mL/Hr) IV Continuous <Continuous>  lactated ringers. 1000 milliLiter(s) (75 mL/Hr) IV Continuous <Continuous>  losartan 100 milliGRAM(s) Oral daily  morphine  - Injectable 4 milliGRAM(s) IV Push every 10 minutes PRN Severe Pain (7 - 10)  oxyCODONE    IR 5 milliGRAM(s) Oral every 6 hours PRN Moderate Pain (4 - 6)      VITAL SIGNS, INS/OUTS (Last 24Hours)  ICU Vital Signs Last 24 Hrs  T(C): 36.9 (03 Apr 2023 07:15), Max: 36.9 (03 Apr 2023 06:35)  T(F): 98.5 (03 Apr 2023 06:35), Max: 98.5 (03 Apr 2023 06:35)  HR: 76 (03 Apr 2023 07:15) (76 - 76)  BP: 135/78 (03 Apr 2023 07:15) (135/78 - 135/78)  BP(mean): --  ABP: --  ABP(mean): --  RR: 15 (03 Apr 2023 07:15) (15 - 15)  SpO2: 99% (03 Apr 2023 07:15) (99% - 99%)    O2 Parameters below as of 03 Apr 2023 06:35  Patient On (Oxygen Delivery Method): room air          I&O's Summary      Physical Exam:   ----------------------------------------------------------------------------------------------------------  GCS:  15    Exam: A&Ox3, no focal deficits, able to move all extremities    RESPIRATORY:  Normal expansion/effort    CARDIOVASCULAR:   S1/S2.  RRR  No peripheral edema    GASTROINTESTINAL:  Abdomen soft, non-tender, non-distended. Prevena in place to suction  JANICE drain x 2 with serosanguinous drainage     MUSCULOSKELETAL:  Extremities warm, pink, well-perfused.    DERM:  No skin breakdown     : Harper catheter in place.     Tubes/Lines/Drains   ----------------------------------------------------------------------------------------------------------  [x] Peripheral IV  [x] Urinary Catheter Harper  [x] L Midline                                              [ ] Central Venous Line                   [ ] Arterial Line

## 2023-04-03 NOTE — BRIEF OPERATIVE NOTE - NSICDXBRIEFPROCEDURE_GEN_ALL_CORE_FT
PROCEDURES:  Pylorus-sparing pancreaticoduodenectomy 03-Apr-2023 15:35:48  Ashley Dozier  Block, transversus abdominis plane, bilateral 03-Apr-2023 15:36:28  Ashley Dozier

## 2023-04-04 LAB
AMYLASE P1 CFR SERPL: 243 U/L — HIGH (ref 25–115)
ANION GAP SERPL CALC-SCNC: 9 MMOL/L — SIGNIFICANT CHANGE UP (ref 7–14)
BASOPHILS # BLD AUTO: 0.01 K/UL — SIGNIFICANT CHANGE UP (ref 0–0.2)
BASOPHILS NFR BLD AUTO: 0.1 % — SIGNIFICANT CHANGE UP (ref 0–1)
BUN SERPL-MCNC: 16 MG/DL — SIGNIFICANT CHANGE UP (ref 10–20)
CALCIUM SERPL-MCNC: 8.2 MG/DL — LOW (ref 8.4–10.4)
CHLORIDE SERPL-SCNC: 106 MMOL/L — SIGNIFICANT CHANGE UP (ref 98–110)
CK MB CFR SERPL CALC: 2.4 NG/ML — SIGNIFICANT CHANGE UP (ref 0.6–6.3)
CO2 SERPL-SCNC: 24 MMOL/L — SIGNIFICANT CHANGE UP (ref 17–32)
CREAT SERPL-MCNC: 0.7 MG/DL — SIGNIFICANT CHANGE UP (ref 0.7–1.5)
EGFR: 98 ML/MIN/1.73M2 — SIGNIFICANT CHANGE UP
EOSINOPHIL # BLD AUTO: 0.01 K/UL — SIGNIFICANT CHANGE UP (ref 0–0.7)
EOSINOPHIL NFR BLD AUTO: 0.1 % — SIGNIFICANT CHANGE UP (ref 0–8)
GLUCOSE BLDC GLUCOMTR-MCNC: 132 MG/DL — HIGH (ref 70–99)
GLUCOSE BLDC GLUCOMTR-MCNC: 133 MG/DL — HIGH (ref 70–99)
GLUCOSE BLDC GLUCOMTR-MCNC: 140 MG/DL — HIGH (ref 70–99)
GLUCOSE BLDC GLUCOMTR-MCNC: 143 MG/DL — HIGH (ref 70–99)
GLUCOSE BLDC GLUCOMTR-MCNC: 145 MG/DL — HIGH (ref 70–99)
GLUCOSE BLDC GLUCOMTR-MCNC: 170 MG/DL — HIGH (ref 70–99)
GLUCOSE SERPL-MCNC: 141 MG/DL — HIGH (ref 70–99)
GRAM STN FLD: SIGNIFICANT CHANGE UP
HCT VFR BLD CALC: 33 % — LOW (ref 37–47)
HGB BLD-MCNC: 11.3 G/DL — LOW (ref 12–16)
IMM GRANULOCYTES NFR BLD AUTO: 0.3 % — SIGNIFICANT CHANGE UP (ref 0.1–0.3)
LACTATE SERPL-SCNC: 1.6 MMOL/L — SIGNIFICANT CHANGE UP (ref 0.7–2)
LACTATE SERPL-SCNC: 3.1 MMOL/L — HIGH (ref 0.7–2)
LIDOCAIN IGE QN: 256 U/L — HIGH (ref 7–60)
LYMPHOCYTES # BLD AUTO: 1.27 K/UL — SIGNIFICANT CHANGE UP (ref 1.2–3.4)
LYMPHOCYTES # BLD AUTO: 17.4 % — LOW (ref 20.5–51.1)
MAGNESIUM SERPL-MCNC: 1.4 MG/DL — LOW (ref 1.8–2.4)
MCHC RBC-ENTMCNC: 31.6 PG — HIGH (ref 27–31)
MCHC RBC-ENTMCNC: 34.2 G/DL — SIGNIFICANT CHANGE UP (ref 32–37)
MCV RBC AUTO: 92.2 FL — SIGNIFICANT CHANGE UP (ref 81–99)
MONOCYTES # BLD AUTO: 0.33 K/UL — SIGNIFICANT CHANGE UP (ref 0.1–0.6)
MONOCYTES NFR BLD AUTO: 4.5 % — SIGNIFICANT CHANGE UP (ref 1.7–9.3)
NEUTROPHILS # BLD AUTO: 5.65 K/UL — SIGNIFICANT CHANGE UP (ref 1.4–6.5)
NEUTROPHILS NFR BLD AUTO: 77.6 % — HIGH (ref 42.2–75.2)
NRBC # BLD: 0 /100 WBCS — SIGNIFICANT CHANGE UP (ref 0–0)
PHOSPHATE SERPL-MCNC: 2.9 MG/DL — SIGNIFICANT CHANGE UP (ref 2.1–4.9)
PLATELET # BLD AUTO: 204 K/UL — SIGNIFICANT CHANGE UP (ref 130–400)
POTASSIUM SERPL-MCNC: 4.2 MMOL/L — SIGNIFICANT CHANGE UP (ref 3.5–5)
POTASSIUM SERPL-SCNC: 4.2 MMOL/L — SIGNIFICANT CHANGE UP (ref 3.5–5)
RBC # BLD: 3.58 M/UL — LOW (ref 4.2–5.4)
RBC # FLD: 12 % — SIGNIFICANT CHANGE UP (ref 11.5–14.5)
SODIUM SERPL-SCNC: 139 MMOL/L — SIGNIFICANT CHANGE UP (ref 135–146)
SPECIMEN SOURCE: SIGNIFICANT CHANGE UP
TROPONIN T SERPL-MCNC: <0.01 NG/ML — SIGNIFICANT CHANGE UP
WBC # BLD: 7.29 K/UL — SIGNIFICANT CHANGE UP (ref 4.8–10.8)
WBC # FLD AUTO: 7.29 K/UL — SIGNIFICANT CHANGE UP (ref 4.8–10.8)

## 2023-04-04 PROCEDURE — 71045 X-RAY EXAM CHEST 1 VIEW: CPT | Mod: 26

## 2023-04-04 RX ORDER — INSULIN LISPRO 100/ML
VIAL (ML) SUBCUTANEOUS EVERY 4 HOURS
Refills: 0 | Status: DISCONTINUED | OUTPATIENT
Start: 2023-04-04 | End: 2023-04-06

## 2023-04-04 RX ORDER — SODIUM CHLORIDE 9 MG/ML
500 INJECTION, SOLUTION INTRAVENOUS ONCE
Refills: 0 | Status: COMPLETED | OUTPATIENT
Start: 2023-04-04 | End: 2023-04-04

## 2023-04-04 RX ADMIN — ENOXAPARIN SODIUM 40 MILLIGRAM(S): 100 INJECTION SUBCUTANEOUS at 17:05

## 2023-04-04 RX ADMIN — Medication 650 MILLIGRAM(S): at 06:40

## 2023-04-04 RX ADMIN — OXYCODONE HYDROCHLORIDE 5 MILLIGRAM(S): 5 TABLET ORAL at 21:54

## 2023-04-04 RX ADMIN — Medication 650 MILLIGRAM(S): at 00:17

## 2023-04-04 RX ADMIN — Medication 15 MILLIGRAM(S): at 11:45

## 2023-04-04 RX ADMIN — SODIUM CHLORIDE 110 MILLILITER(S): 9 INJECTION, SOLUTION INTRAVENOUS at 10:32

## 2023-04-04 RX ADMIN — Medication 650 MILLIGRAM(S): at 11:14

## 2023-04-04 RX ADMIN — GABAPENTIN 100 MILLIGRAM(S): 400 CAPSULE ORAL at 21:46

## 2023-04-04 RX ADMIN — Medication 650 MILLIGRAM(S): at 17:15

## 2023-04-04 RX ADMIN — LOSARTAN POTASSIUM 100 MILLIGRAM(S): 100 TABLET, FILM COATED ORAL at 06:40

## 2023-04-04 RX ADMIN — Medication 650 MILLIGRAM(S): at 07:00

## 2023-04-04 RX ADMIN — AMPICILLIN SODIUM AND SULBACTAM SODIUM 200 GRAM(S): 250; 125 INJECTION, POWDER, FOR SUSPENSION INTRAMUSCULAR; INTRAVENOUS at 17:06

## 2023-04-04 RX ADMIN — Medication 15 MILLIGRAM(S): at 06:42

## 2023-04-04 RX ADMIN — Medication 15 MILLIGRAM(S): at 17:06

## 2023-04-04 RX ADMIN — AMPICILLIN SODIUM AND SULBACTAM SODIUM 200 GRAM(S): 250; 125 INJECTION, POWDER, FOR SUSPENSION INTRAMUSCULAR; INTRAVENOUS at 06:41

## 2023-04-04 RX ADMIN — Medication 15 MILLIGRAM(S): at 06:41

## 2023-04-04 RX ADMIN — Medication 650 MILLIGRAM(S): at 11:45

## 2023-04-04 RX ADMIN — Medication 15 MILLIGRAM(S): at 11:14

## 2023-04-04 RX ADMIN — GABAPENTIN 100 MILLIGRAM(S): 400 CAPSULE ORAL at 14:45

## 2023-04-04 RX ADMIN — AMPICILLIN SODIUM AND SULBACTAM SODIUM 200 GRAM(S): 250; 125 INJECTION, POWDER, FOR SUSPENSION INTRAMUSCULAR; INTRAVENOUS at 11:14

## 2023-04-04 RX ADMIN — OXYCODONE HYDROCHLORIDE 5 MILLIGRAM(S): 5 TABLET ORAL at 21:37

## 2023-04-04 RX ADMIN — GABAPENTIN 100 MILLIGRAM(S): 400 CAPSULE ORAL at 06:41

## 2023-04-04 RX ADMIN — Medication 4: at 10:32

## 2023-04-04 RX ADMIN — Medication 650 MILLIGRAM(S): at 17:06

## 2023-04-04 RX ADMIN — Medication 15 MILLIGRAM(S): at 00:17

## 2023-04-04 RX ADMIN — Medication 15 MILLIGRAM(S): at 17:16

## 2023-04-04 RX ADMIN — ATORVASTATIN CALCIUM 20 MILLIGRAM(S): 80 TABLET, FILM COATED ORAL at 21:46

## 2023-04-04 RX ADMIN — OXYCODONE HYDROCHLORIDE 5 MILLIGRAM(S): 5 TABLET ORAL at 15:13

## 2023-04-04 RX ADMIN — SODIUM CHLORIDE 1000 MILLILITER(S): 9 INJECTION, SOLUTION INTRAVENOUS at 01:30

## 2023-04-04 RX ADMIN — OXYCODONE HYDROCHLORIDE 5 MILLIGRAM(S): 5 TABLET ORAL at 16:13

## 2023-04-04 NOTE — PHYSICAL THERAPY INITIAL EVALUATION ADULT - BED MOBILITY TRAINING, PT EVAL
BED MOBILITY ASSESSMENT AND TRAINING AS APPROPRIATE. Goal: TBD when bed mobility status is assessed.

## 2023-04-04 NOTE — PROGRESS NOTE ADULT - ASSESSMENT
63F with a PMHx of HTN, HLD, and DM s/p elective Pylorus Preserving Whipple. Patient tolerated procedure well, post operative patient was admitted to the SDU.    PLAN:  -SDU  -NPO  -IVFs  -Pain control  -Monitor JANICE output  -DC harper  -F/U TOV  -Trend lactate  -Monitor FS  -GI/DVT ppx  -PT eval    spectra [phone] 63F with a PMHx of HTN, HLD, and DM s/p elective Pylorus Preserving Whipple. Patient tolerated procedure well, post operative patient was admitted to the SDU.  # [id number]    PLAN:  -SDU  -NPO  -IVFs  -Pain control  -Monitor JANICE output  -DC harper  -F/U TOV  -Trend lactate  -Monitor FS  -GI/DVT ppx  -PT eval    spectra [phone]

## 2023-04-04 NOTE — PHYSICAL THERAPY INITIAL EVALUATION ADULT - ADDITIONAL COMMENTS
pt lives with daughter and family in a house with 10 steps to enter and none inside. pt was fully independent prior to admission.

## 2023-04-04 NOTE — PHYSICAL THERAPY INITIAL EVALUATION ADULT - NSPTDISCHREC_GEN_A_CORE
Patient requires assistance with functional mobility. Based on today's evaluation,  PT recommends D/C to home with assistance when medically appropriate./Home PT

## 2023-04-04 NOTE — PHYSICAL THERAPY INITIAL EVALUATION ADULT - GAIT TRAINING, PT EVAL
Goal: pt will ambulate independent without device long distances by discharge to facilitate return to PLOF.

## 2023-04-04 NOTE — PHYSICAL THERAPY INITIAL EVALUATION ADULT - PERTINENT HX OF CURRENT PROBLEM, REHAB EVAL
63F with a PMHx of HTN, HLD, and DM s/p elective Pylorus Preserving Whipple. Patient tolerated procedure well, post operative patient was admitted to the SDU.

## 2023-04-04 NOTE — PHYSICAL THERAPY INITIAL EVALUATION ADULT - PLANNED THERAPY INTERVENTIONS, PT EVAL
STAIR ASSESSMENT AND TRAINING AS APPROPRIATE. Goal: TBD when stair status is assessed./bed mobility training/gait training

## 2023-04-05 ENCOUNTER — TRANSCRIPTION ENCOUNTER (OUTPATIENT)
Age: 63
End: 2023-04-05

## 2023-04-05 LAB
ALBUMIN SERPL ELPH-MCNC: 3.2 G/DL — LOW (ref 3.5–5.2)
ALP SERPL-CCNC: 49 U/L — SIGNIFICANT CHANGE UP (ref 30–115)
ALT FLD-CCNC: 35 U/L — SIGNIFICANT CHANGE UP (ref 0–41)
ANION GAP SERPL CALC-SCNC: 10 MMOL/L — SIGNIFICANT CHANGE UP (ref 7–14)
AST SERPL-CCNC: 38 U/L — SIGNIFICANT CHANGE UP (ref 0–41)
BASOPHILS # BLD AUTO: 0.01 K/UL — SIGNIFICANT CHANGE UP (ref 0–0.2)
BASOPHILS NFR BLD AUTO: 0.1 % — SIGNIFICANT CHANGE UP (ref 0–1)
BILIRUB DIRECT SERPL-MCNC: 0.4 MG/DL — HIGH (ref 0–0.3)
BILIRUB INDIRECT FLD-MCNC: 0.8 MG/DL — SIGNIFICANT CHANGE UP (ref 0.2–1.2)
BILIRUB SERPL-MCNC: 1.2 MG/DL — SIGNIFICANT CHANGE UP (ref 0.2–1.2)
BUN SERPL-MCNC: 14 MG/DL — SIGNIFICANT CHANGE UP (ref 10–20)
CALCIUM SERPL-MCNC: 7.9 MG/DL — LOW (ref 8.4–10.5)
CHLORIDE SERPL-SCNC: 104 MMOL/L — SIGNIFICANT CHANGE UP (ref 98–110)
CO2 SERPL-SCNC: 24 MMOL/L — SIGNIFICANT CHANGE UP (ref 17–32)
CREAT SERPL-MCNC: 0.7 MG/DL — SIGNIFICANT CHANGE UP (ref 0.7–1.5)
EGFR: 98 ML/MIN/1.73M2 — SIGNIFICANT CHANGE UP
EOSINOPHIL # BLD AUTO: 0.03 K/UL — SIGNIFICANT CHANGE UP (ref 0–0.7)
EOSINOPHIL NFR BLD AUTO: 0.4 % — SIGNIFICANT CHANGE UP (ref 0–8)
GLUCOSE BLDC GLUCOMTR-MCNC: 116 MG/DL — HIGH (ref 70–99)
GLUCOSE BLDC GLUCOMTR-MCNC: 122 MG/DL — HIGH (ref 70–99)
GLUCOSE BLDC GLUCOMTR-MCNC: 126 MG/DL — HIGH (ref 70–99)
GLUCOSE BLDC GLUCOMTR-MCNC: 131 MG/DL — HIGH (ref 70–99)
GLUCOSE BLDC GLUCOMTR-MCNC: 150 MG/DL — HIGH (ref 70–99)
GLUCOSE SERPL-MCNC: 140 MG/DL — HIGH (ref 70–99)
HCT VFR BLD CALC: 29.5 % — LOW (ref 37–47)
HGB BLD-MCNC: 9.7 G/DL — LOW (ref 12–16)
IMM GRANULOCYTES NFR BLD AUTO: 0.4 % — HIGH (ref 0.1–0.3)
LYMPHOCYTES # BLD AUTO: 1.07 K/UL — LOW (ref 1.2–3.4)
LYMPHOCYTES # BLD AUTO: 14.3 % — LOW (ref 20.5–51.1)
MAGNESIUM SERPL-MCNC: 2 MG/DL — SIGNIFICANT CHANGE UP (ref 1.8–2.4)
MCHC RBC-ENTMCNC: 30.9 PG — SIGNIFICANT CHANGE UP (ref 27–31)
MCHC RBC-ENTMCNC: 32.9 G/DL — SIGNIFICANT CHANGE UP (ref 32–37)
MCV RBC AUTO: 93.9 FL — SIGNIFICANT CHANGE UP (ref 81–99)
MONOCYTES # BLD AUTO: 0.42 K/UL — SIGNIFICANT CHANGE UP (ref 0.1–0.6)
MONOCYTES NFR BLD AUTO: 5.6 % — SIGNIFICANT CHANGE UP (ref 1.7–9.3)
NEUTROPHILS # BLD AUTO: 5.94 K/UL — SIGNIFICANT CHANGE UP (ref 1.4–6.5)
NEUTROPHILS NFR BLD AUTO: 79.2 % — HIGH (ref 42.2–75.2)
NRBC # BLD: 0 /100 WBCS — SIGNIFICANT CHANGE UP (ref 0–0)
PHOSPHATE SERPL-MCNC: 2.5 MG/DL — SIGNIFICANT CHANGE UP (ref 2.1–4.9)
PLATELET # BLD AUTO: 172 K/UL — SIGNIFICANT CHANGE UP (ref 130–400)
POTASSIUM SERPL-MCNC: 4.3 MMOL/L — SIGNIFICANT CHANGE UP (ref 3.5–5)
POTASSIUM SERPL-SCNC: 4.3 MMOL/L — SIGNIFICANT CHANGE UP (ref 3.5–5)
PROT SERPL-MCNC: 5 G/DL — LOW (ref 6–8)
RBC # BLD: 3.14 M/UL — LOW (ref 4.2–5.4)
RBC # FLD: 12.2 % — SIGNIFICANT CHANGE UP (ref 11.5–14.5)
SODIUM SERPL-SCNC: 138 MMOL/L — SIGNIFICANT CHANGE UP (ref 135–146)
SURGICAL PATHOLOGY STUDY: SIGNIFICANT CHANGE UP
WBC # BLD: 7.5 K/UL — SIGNIFICANT CHANGE UP (ref 4.8–10.8)
WBC # FLD AUTO: 7.5 K/UL — SIGNIFICANT CHANGE UP (ref 4.8–10.8)

## 2023-04-05 RX ORDER — LANOLIN ALCOHOL/MO/W.PET/CERES
5 CREAM (GRAM) TOPICAL AT BEDTIME
Refills: 0 | Status: DISCONTINUED | OUTPATIENT
Start: 2023-04-05 | End: 2023-04-08

## 2023-04-05 RX ORDER — KETOROLAC TROMETHAMINE 30 MG/ML
30 SYRINGE (ML) INJECTION EVERY 8 HOURS
Refills: 0 | Status: DISCONTINUED | OUTPATIENT
Start: 2023-04-05 | End: 2023-04-08

## 2023-04-05 RX ORDER — MAGNESIUM SULFATE 500 MG/ML
2 VIAL (ML) INJECTION ONCE
Refills: 0 | Status: COMPLETED | OUTPATIENT
Start: 2023-04-05 | End: 2023-04-05

## 2023-04-05 RX ORDER — SODIUM CHLORIDE 9 MG/ML
1000 INJECTION, SOLUTION INTRAVENOUS
Refills: 0 | Status: DISCONTINUED | OUTPATIENT
Start: 2023-04-05 | End: 2023-04-05

## 2023-04-05 RX ADMIN — Medication 30 MILLIGRAM(S): at 23:22

## 2023-04-05 RX ADMIN — OXYCODONE HYDROCHLORIDE 5 MILLIGRAM(S): 5 TABLET ORAL at 21:15

## 2023-04-05 RX ADMIN — Medication 650 MILLIGRAM(S): at 00:00

## 2023-04-05 RX ADMIN — OXYCODONE HYDROCHLORIDE 5 MILLIGRAM(S): 5 TABLET ORAL at 10:08

## 2023-04-05 RX ADMIN — GABAPENTIN 100 MILLIGRAM(S): 400 CAPSULE ORAL at 21:15

## 2023-04-05 RX ADMIN — Medication 650 MILLIGRAM(S): at 12:35

## 2023-04-05 RX ADMIN — Medication 15 MILLIGRAM(S): at 00:00

## 2023-04-05 RX ADMIN — Medication 5 MILLIGRAM(S): at 01:43

## 2023-04-05 RX ADMIN — AMPICILLIN SODIUM AND SULBACTAM SODIUM 200 GRAM(S): 250; 125 INJECTION, POWDER, FOR SUSPENSION INTRAMUSCULAR; INTRAVENOUS at 11:30

## 2023-04-05 RX ADMIN — OXYCODONE HYDROCHLORIDE 5 MILLIGRAM(S): 5 TABLET ORAL at 09:41

## 2023-04-05 RX ADMIN — AMPICILLIN SODIUM AND SULBACTAM SODIUM 200 GRAM(S): 250; 125 INJECTION, POWDER, FOR SUSPENSION INTRAMUSCULAR; INTRAVENOUS at 17:43

## 2023-04-05 RX ADMIN — Medication 650 MILLIGRAM(S): at 18:00

## 2023-04-05 RX ADMIN — Medication 15 MILLIGRAM(S): at 17:43

## 2023-04-05 RX ADMIN — SODIUM CHLORIDE 40 MILLILITER(S): 9 INJECTION, SOLUTION INTRAVENOUS at 09:42

## 2023-04-05 RX ADMIN — ATORVASTATIN CALCIUM 20 MILLIGRAM(S): 80 TABLET, FILM COATED ORAL at 21:15

## 2023-04-05 RX ADMIN — Medication 30 MILLIGRAM(S): at 23:52

## 2023-04-05 RX ADMIN — Medication 12.5 GRAM(S): at 00:00

## 2023-04-05 RX ADMIN — ENOXAPARIN SODIUM 40 MILLIGRAM(S): 100 INJECTION SUBCUTANEOUS at 17:42

## 2023-04-05 RX ADMIN — Medication 650 MILLIGRAM(S): at 23:20

## 2023-04-05 RX ADMIN — Medication 650 MILLIGRAM(S): at 06:07

## 2023-04-05 RX ADMIN — GABAPENTIN 100 MILLIGRAM(S): 400 CAPSULE ORAL at 13:44

## 2023-04-05 RX ADMIN — AMPICILLIN SODIUM AND SULBACTAM SODIUM 200 GRAM(S): 250; 125 INJECTION, POWDER, FOR SUSPENSION INTRAMUSCULAR; INTRAVENOUS at 00:00

## 2023-04-05 RX ADMIN — Medication 650 MILLIGRAM(S): at 00:30

## 2023-04-05 RX ADMIN — Medication 15 MILLIGRAM(S): at 12:04

## 2023-04-05 RX ADMIN — Medication 15 MILLIGRAM(S): at 06:08

## 2023-04-05 RX ADMIN — GABAPENTIN 100 MILLIGRAM(S): 400 CAPSULE ORAL at 06:07

## 2023-04-05 RX ADMIN — AMPICILLIN SODIUM AND SULBACTAM SODIUM 200 GRAM(S): 250; 125 INJECTION, POWDER, FOR SUSPENSION INTRAMUSCULAR; INTRAVENOUS at 23:19

## 2023-04-05 RX ADMIN — Medication 15 MILLIGRAM(S): at 12:35

## 2023-04-05 RX ADMIN — AMPICILLIN SODIUM AND SULBACTAM SODIUM 200 GRAM(S): 250; 125 INJECTION, POWDER, FOR SUSPENSION INTRAMUSCULAR; INTRAVENOUS at 06:07

## 2023-04-05 RX ADMIN — Medication 5 MILLIGRAM(S): at 21:15

## 2023-04-05 RX ADMIN — Medication 650 MILLIGRAM(S): at 17:43

## 2023-04-05 RX ADMIN — LOSARTAN POTASSIUM 100 MILLIGRAM(S): 100 TABLET, FILM COATED ORAL at 06:07

## 2023-04-05 RX ADMIN — Medication 650 MILLIGRAM(S): at 12:04

## 2023-04-05 RX ADMIN — Medication 650 MILLIGRAM(S): at 23:29

## 2023-04-05 RX ADMIN — Medication 15 MILLIGRAM(S): at 17:45

## 2023-04-05 RX ADMIN — OXYCODONE HYDROCHLORIDE 5 MILLIGRAM(S): 5 TABLET ORAL at 22:00

## 2023-04-05 NOTE — DIETITIAN INITIAL EVALUATION ADULT - PERSON TAUGHT/METHOD
granddaughter/reviewed diet post whipple diet verbally and print materials provided/verbal instruction/written material/patient instructed

## 2023-04-05 NOTE — DIETITIAN INITIAL EVALUATION ADULT - PERTINENT LABORATORY DATA
9.7    7.50  )-----------( 172      ( 05 Apr 2023 05:51 )             29.5     04-05    138  |  104  |  14  ----------------------------<  140<H>  4.3   |  24  |  0.7    Ca    7.9<L>      05 Apr 2023 05:51  Phos  2.5     04-05  Mg     2.0     04-05    TPro  5.0<L>  /  Alb  3.2<L>  /  TBili  1.2  /  DBili  0.4<H>  /  AST  38  /  ALT  35  /  AlkPhos  49  04-05    CAPILLARY BLOOD GLUCOSE  POCT Blood Glucose.: 116 mg/dL (05 Apr 2023 09:44)  POCT Blood Glucose.: 131 mg/dL (05 Apr 2023 07:16)  POCT Blood Glucose.: 132 mg/dL (04 Apr 2023 21:49)  POCT Blood Glucose.: 133 mg/dL (04 Apr 2023 17:05)  POCT Blood Glucose.: 145 mg/dL (04 Apr 2023 14:44)    A1C with Estimated Average Glucose Result: 7.1 % (03-13-23 @ 10:56)

## 2023-04-05 NOTE — DIETITIAN INITIAL EVALUATION ADULT - ORAL NUTRITION SUPPLEMENTS
Cont with Ensure Clear (240kcal, 8g protein each) 3x/day as tolerated (pt at risk for dumping syndrome); add 1pkt No Carb Prosource (1pkg = 15gms Protein) in each bottle of Ensure Clear to provide additional 60kcal, 15g protein.   -- if pt unable to tolerate concentrated sweets and Ensure Clear, switch to Prosource gelatein 20 SF (80kcal, 20g protein each) 1can 3x/day and No Carb Prosource (1pkg = 15gms Protein) 1pkt once a day.

## 2023-04-05 NOTE — DIETITIAN INITIAL EVALUATION ADULT - NUTRITIONGOAL OUTCOME1
pt to meet >50% estimated needs in 3-5 days. RD to follow per high risk protocol.  Monitor diet order, kcal intake, body composition, NFPE, labs  (lytes, BG, renal indices)

## 2023-04-05 NOTE — DISCHARGE NOTE NURSING/CASE MANAGEMENT/SOCIAL WORK - NSDCPEFALRISK_GEN_ALL_CORE
For information on Fall & Injury Prevention, visit: [email or web address] OR  [email or web address] OR  https://www.cdc.gov/steadi/patient.html

## 2023-04-05 NOTE — CDI QUERY NOTE - NSCDIOTHERTXTBX_GEN_ALL_CORE_HH
CLINICAL INDICATORS    4/3 Consult Node Adult-SICU Physician Assistant: Elevated lactate post op 5.8 (500cc bolus > repeat at 2330);     4/4 Progress Note Adult-Surgery Resident: Lactate trending down, 1L bolus total given    4/5 Progress Note Adult-Surgery Resident: Lactate cleared    Labs: (4/3 1940) Lactate 5.8; (4/3 2350) Lactate 3.1; (4/4) Lactate 1.6    Orders:   (4/3) STAT Lactate; Repeat Lactate at 2330; (4/4) Lactate at 1100 and 1600  (4/3) LR 500mL IV bolus over 1 hour x 1 STAT; (4/4) LR 500mL IV bolus over 30 minutes x 1 STAT        Based on your professional judgment and the clinical indicators, please clarify the significance of the above lactate lab values:      - Lactic acidosis  - Insignificant lactate lab values  - Other (please specify):   - Unable to clinically determine the significance of the lactate lab value      Thank you,  Zohra DELEON, RN, BSN  [phone]

## 2023-04-05 NOTE — DIETITIAN INITIAL EVALUATION ADULT - REASON FOR ADMISSION
----- Message from Flakita Ortiz hematologist.    Other pancreatic disorder

## 2023-04-05 NOTE — DIETITIAN INITIAL EVALUATION ADULT - ENERGY INTAKE
Pt started on clears this AM. Had a few sips of clears. Has not had Ensure Clear yet. Denies GI issues.

## 2023-04-05 NOTE — DIETITIAN INITIAL EVALUATION ADULT - NS FNS DIET ORDER
Diet, Clear Liquid:   Supplement Feeding Modality:  Oral  Ensure Clear Cans or Servings Per Day:  3       Frequency:  Three Times a day (04-05-23 @ 08:31)

## 2023-04-05 NOTE — DIETITIAN INITIAL EVALUATION ADULT - ORAL INTAKE PTA/DIET HISTORY
Hx obtained from pt at bedside. Pt is a native speaker of pt's preferred language; interview was conducted in Mandarin. Reports good PO intake PTA. No food allergy/intolerance. Diet is balanced with food from all food groups. Avoids sugary food & drinks and excessive carb intake. No supplement use. No chewing/swallowing issues. UBW 152lbs. Denies recent weight change. Height 160cm.

## 2023-04-05 NOTE — DIETITIAN INITIAL EVALUATION ADULT - PERTINENT MEDS FT
MEDICATIONS  (STANDING):  acetaminophen     Tablet .. 650 milliGRAM(s) Oral every 6 hours  ampicillin/sulbactam  IVPB      ampicillin/sulbactam  IVPB 3 Gram(s) IV Intermittent every 6 hours  atorvastatin 20 milliGRAM(s) Oral at bedtime  dextrose 5% + sodium chloride 0.45%. 1000 milliLiter(s) (40 mL/Hr) IV Continuous <Continuous>  -- 163kcal/day  dextrose 50% Injectable 25 Gram(s) IV Push once  enoxaparin Injectable 40 milliGRAM(s) SubCutaneous every 24 hours  gabapentin 100 milliGRAM(s) Oral every 8 hours  insulin lispro (ADMELOG) corrective regimen sliding scale   SubCutaneous every 4 hours  ketorolac   Injectable 15 milliGRAM(s) IV Push every 6 hours  losartan 100 milliGRAM(s) Oral daily  melatonin 5 milliGRAM(s) Oral at bedtime    MEDICATIONS  (PRN):  oxyCODONE    IR 5 milliGRAM(s) Oral every 6 hours PRN Moderate Pain (4 - 6)

## 2023-04-05 NOTE — DIETITIAN INITIAL EVALUATION ADULT - DIET TYPE
Cont with regular clear liquid diet if able to tolerate concentrated sweets s/p whipple given concerns for dumping syndrome. If not, switch to bariatric diet stage 1 and add supplements below. Advance diet to DASH, low residual, consistent carb diet with evening snack +/- additional note in diet order "no concentrated sweets".

## 2023-04-05 NOTE — PROGRESS NOTE ADULT - ASSESSMENT
63F with a PMHx of HTN, HLD, and DM s/p elective Pylorus Preserving Whipple. Patient tolerated procedure well, post operative patient was admitted to the SDU.  # [id number]     PLAN:   - SDU   - NPO   - IVFs   - Pain control   - Monitor JANICE output   - Lactate cleared   - Monitor FS   - GI/DVT ppx   - PT eval    [facility]  spectra [phone]

## 2023-04-05 NOTE — DIETITIAN INITIAL EVALUATION ADULT - NSFNSGIIOFT_GEN_A_CORE
I&O's Detail    04 Apr 2023 07:01  -  05 Apr 2023 07:00  --------------------------------------------------------  IN:    IV PiggyBack: 200 mL    Lactated Ringers: 1430 mL  Total IN: 1630 mL    OUT:    Bulb (mL): 180 mL    Bulb (mL): 70 mL    Voided (mL): 500 mL  Total OUT: 750 mL    Total NET: 880 mL

## 2023-04-05 NOTE — DISCHARGE NOTE NURSING/CASE MANAGEMENT/SOCIAL WORK - PATIENT PORTAL LINK FT
You can access the FollowMyHealth Patient Portal offered by [facility] by registering at the following website: [email or web address]. By joining [facility]’s FollowMyHealth portal, you will also be able to view your health information using other applications (apps) compatible with our system.

## 2023-04-06 LAB
ALBUMIN SERPL ELPH-MCNC: 3 G/DL — LOW (ref 3.5–5.2)
ALBUMIN SERPL ELPH-MCNC: 3.1 G/DL — LOW (ref 3.5–5.2)
ALP SERPL-CCNC: 201 U/L — HIGH (ref 30–115)
ALP SERPL-CCNC: 90 U/L — SIGNIFICANT CHANGE UP (ref 30–115)
ALT FLD-CCNC: 23 U/L — SIGNIFICANT CHANGE UP (ref 0–41)
ALT FLD-CCNC: 27 U/L — SIGNIFICANT CHANGE UP (ref 0–41)
AMYLASE FLD-CCNC: 125 U/L — SIGNIFICANT CHANGE UP
AMYLASE FLD-CCNC: 293 U/L — SIGNIFICANT CHANGE UP
AMYLASE P1 CFR SERPL: 64 U/L — SIGNIFICANT CHANGE UP (ref 25–115)
AMYLASE P1 CFR SERPL: 88 U/L — SIGNIFICANT CHANGE UP (ref 25–115)
ANION GAP SERPL CALC-SCNC: 12 MMOL/L — SIGNIFICANT CHANGE UP (ref 7–14)
ANION GAP SERPL CALC-SCNC: 13 MMOL/L — SIGNIFICANT CHANGE UP (ref 7–14)
AST SERPL-CCNC: 25 U/L — SIGNIFICANT CHANGE UP (ref 0–41)
AST SERPL-CCNC: 28 U/L — SIGNIFICANT CHANGE UP (ref 0–41)
BASOPHILS # BLD AUTO: 0.01 K/UL — SIGNIFICANT CHANGE UP (ref 0–0.2)
BASOPHILS # BLD AUTO: 0.01 K/UL — SIGNIFICANT CHANGE UP (ref 0–0.2)
BASOPHILS NFR BLD AUTO: 0.1 % — SIGNIFICANT CHANGE UP (ref 0–1)
BASOPHILS NFR BLD AUTO: 0.2 % — SIGNIFICANT CHANGE UP (ref 0–1)
BILIRUB DIRECT SERPL-MCNC: 0.2 MG/DL — SIGNIFICANT CHANGE UP (ref 0–0.3)
BILIRUB DIRECT SERPL-MCNC: 0.2 MG/DL — SIGNIFICANT CHANGE UP (ref 0–0.3)
BILIRUB INDIRECT FLD-MCNC: 0.5 MG/DL — SIGNIFICANT CHANGE UP (ref 0.2–1.2)
BILIRUB INDIRECT FLD-MCNC: 0.8 MG/DL — SIGNIFICANT CHANGE UP (ref 0.2–1.2)
BILIRUB SERPL-MCNC: 0.7 MG/DL — SIGNIFICANT CHANGE UP (ref 0.2–1.2)
BILIRUB SERPL-MCNC: 1 MG/DL — SIGNIFICANT CHANGE UP (ref 0.2–1.2)
BUN SERPL-MCNC: 10 MG/DL — SIGNIFICANT CHANGE UP (ref 10–20)
BUN SERPL-MCNC: 10 MG/DL — SIGNIFICANT CHANGE UP (ref 10–20)
CALCIUM SERPL-MCNC: 7.8 MG/DL — LOW (ref 8.4–10.5)
CALCIUM SERPL-MCNC: 8.1 MG/DL — LOW (ref 8.4–10.5)
CHLORIDE SERPL-SCNC: 103 MMOL/L — SIGNIFICANT CHANGE UP (ref 98–110)
CHLORIDE SERPL-SCNC: 106 MMOL/L — SIGNIFICANT CHANGE UP (ref 98–110)
CO2 SERPL-SCNC: 22 MMOL/L — SIGNIFICANT CHANGE UP (ref 17–32)
CO2 SERPL-SCNC: 23 MMOL/L — SIGNIFICANT CHANGE UP (ref 17–32)
CREAT SERPL-MCNC: 0.6 MG/DL — LOW (ref 0.7–1.5)
CREAT SERPL-MCNC: 0.6 MG/DL — LOW (ref 0.7–1.5)
EGFR: 101 ML/MIN/1.73M2 — SIGNIFICANT CHANGE UP
EGFR: 101 ML/MIN/1.73M2 — SIGNIFICANT CHANGE UP
EOSINOPHIL # BLD AUTO: 0.04 K/UL — SIGNIFICANT CHANGE UP (ref 0–0.7)
EOSINOPHIL # BLD AUTO: 0.1 K/UL — SIGNIFICANT CHANGE UP (ref 0–0.7)
EOSINOPHIL NFR BLD AUTO: 0.6 % — SIGNIFICANT CHANGE UP (ref 0–8)
EOSINOPHIL NFR BLD AUTO: 1.4 % — SIGNIFICANT CHANGE UP (ref 0–8)
GLUCOSE BLDC GLUCOMTR-MCNC: 119 MG/DL — HIGH (ref 70–99)
GLUCOSE BLDC GLUCOMTR-MCNC: 130 MG/DL — HIGH (ref 70–99)
GLUCOSE BLDC GLUCOMTR-MCNC: 134 MG/DL — HIGH (ref 70–99)
GLUCOSE BLDC GLUCOMTR-MCNC: 137 MG/DL — HIGH (ref 70–99)
GLUCOSE SERPL-MCNC: 118 MG/DL — HIGH (ref 70–99)
GLUCOSE SERPL-MCNC: 121 MG/DL — HIGH (ref 70–99)
HCT VFR BLD CALC: 25.4 % — LOW (ref 37–47)
HCT VFR BLD CALC: 26.1 % — LOW (ref 37–47)
HGB BLD-MCNC: 8.3 G/DL — LOW (ref 12–16)
HGB BLD-MCNC: 8.7 G/DL — LOW (ref 12–16)
IMM GRANULOCYTES NFR BLD AUTO: 0.6 % — HIGH (ref 0.1–0.3)
IMM GRANULOCYTES NFR BLD AUTO: 0.6 % — HIGH (ref 0.1–0.3)
LIDOCAIN IGE QN: 69 U/L — HIGH (ref 7–60)
LIDOCAIN IGE QN: 94 U/L — HIGH (ref 7–60)
LYMPHOCYTES # BLD AUTO: 0.96 K/UL — LOW (ref 1.2–3.4)
LYMPHOCYTES # BLD AUTO: 1.14 K/UL — LOW (ref 1.2–3.4)
LYMPHOCYTES # BLD AUTO: 15.4 % — LOW (ref 20.5–51.1)
LYMPHOCYTES # BLD AUTO: 16.2 % — LOW (ref 20.5–51.1)
MAGNESIUM SERPL-MCNC: 1.9 MG/DL — SIGNIFICANT CHANGE UP (ref 1.8–2.4)
MAGNESIUM SERPL-MCNC: 1.9 MG/DL — SIGNIFICANT CHANGE UP (ref 1.8–2.4)
MCHC RBC-ENTMCNC: 30.9 PG — SIGNIFICANT CHANGE UP (ref 27–31)
MCHC RBC-ENTMCNC: 31.2 PG — HIGH (ref 27–31)
MCHC RBC-ENTMCNC: 32.7 G/DL — SIGNIFICANT CHANGE UP (ref 32–37)
MCHC RBC-ENTMCNC: 33.3 G/DL — SIGNIFICANT CHANGE UP (ref 32–37)
MCV RBC AUTO: 93.5 FL — SIGNIFICANT CHANGE UP (ref 81–99)
MCV RBC AUTO: 94.4 FL — SIGNIFICANT CHANGE UP (ref 81–99)
MONOCYTES # BLD AUTO: 0.32 K/UL — SIGNIFICANT CHANGE UP (ref 0.1–0.6)
MONOCYTES # BLD AUTO: 0.48 K/UL — SIGNIFICANT CHANGE UP (ref 0.1–0.6)
MONOCYTES NFR BLD AUTO: 5.1 % — SIGNIFICANT CHANGE UP (ref 1.7–9.3)
MONOCYTES NFR BLD AUTO: 6.8 % — SIGNIFICANT CHANGE UP (ref 1.7–9.3)
NEUTROPHILS # BLD AUTO: 4.87 K/UL — SIGNIFICANT CHANGE UP (ref 1.4–6.5)
NEUTROPHILS # BLD AUTO: 5.25 K/UL — SIGNIFICANT CHANGE UP (ref 1.4–6.5)
NEUTROPHILS NFR BLD AUTO: 74.9 % — SIGNIFICANT CHANGE UP (ref 42.2–75.2)
NEUTROPHILS NFR BLD AUTO: 78.1 % — HIGH (ref 42.2–75.2)
NRBC # BLD: 0 /100 WBCS — SIGNIFICANT CHANGE UP (ref 0–0)
NRBC # BLD: 0 /100 WBCS — SIGNIFICANT CHANGE UP (ref 0–0)
PHOSPHATE SERPL-MCNC: 1.8 MG/DL — LOW (ref 2.1–4.9)
PHOSPHATE SERPL-MCNC: 2.6 MG/DL — SIGNIFICANT CHANGE UP (ref 2.1–4.9)
PLATELET # BLD AUTO: 165 K/UL — SIGNIFICANT CHANGE UP (ref 130–400)
PLATELET # BLD AUTO: 187 K/UL — SIGNIFICANT CHANGE UP (ref 130–400)
POTASSIUM SERPL-MCNC: 4.1 MMOL/L — SIGNIFICANT CHANGE UP (ref 3.5–5)
POTASSIUM SERPL-MCNC: 4.1 MMOL/L — SIGNIFICANT CHANGE UP (ref 3.5–5)
POTASSIUM SERPL-SCNC: 4.1 MMOL/L — SIGNIFICANT CHANGE UP (ref 3.5–5)
POTASSIUM SERPL-SCNC: 4.1 MMOL/L — SIGNIFICANT CHANGE UP (ref 3.5–5)
PROT SERPL-MCNC: 5 G/DL — LOW (ref 6–8)
PROT SERPL-MCNC: 5.1 G/DL — LOW (ref 6–8)
RBC # BLD: 2.69 M/UL — LOW (ref 4.2–5.4)
RBC # BLD: 2.79 M/UL — LOW (ref 4.2–5.4)
RBC # FLD: 11.9 % — SIGNIFICANT CHANGE UP (ref 11.5–14.5)
RBC # FLD: 12 % — SIGNIFICANT CHANGE UP (ref 11.5–14.5)
SODIUM SERPL-SCNC: 138 MMOL/L — SIGNIFICANT CHANGE UP (ref 135–146)
SODIUM SERPL-SCNC: 141 MMOL/L — SIGNIFICANT CHANGE UP (ref 135–146)
WBC # BLD: 6.24 K/UL — SIGNIFICANT CHANGE UP (ref 4.8–10.8)
WBC # BLD: 7.02 K/UL — SIGNIFICANT CHANGE UP (ref 4.8–10.8)
WBC # FLD AUTO: 6.24 K/UL — SIGNIFICANT CHANGE UP (ref 4.8–10.8)
WBC # FLD AUTO: 7.02 K/UL — SIGNIFICANT CHANGE UP (ref 4.8–10.8)

## 2023-04-06 RX ORDER — GABAPENTIN 400 MG/1
300 CAPSULE ORAL EVERY 8 HOURS
Refills: 0 | Status: DISCONTINUED | OUTPATIENT
Start: 2023-04-06 | End: 2023-04-08

## 2023-04-06 RX ORDER — INSULIN LISPRO 100/ML
VIAL (ML) SUBCUTANEOUS
Refills: 0 | Status: DISCONTINUED | OUTPATIENT
Start: 2023-04-06 | End: 2023-04-08

## 2023-04-06 RX ADMIN — Medication 650 MILLIGRAM(S): at 23:33

## 2023-04-06 RX ADMIN — Medication 650 MILLIGRAM(S): at 17:26

## 2023-04-06 RX ADMIN — Medication 30 MILLIGRAM(S): at 05:26

## 2023-04-06 RX ADMIN — Medication 650 MILLIGRAM(S): at 05:19

## 2023-04-06 RX ADMIN — ATORVASTATIN CALCIUM 20 MILLIGRAM(S): 80 TABLET, FILM COATED ORAL at 21:03

## 2023-04-06 RX ADMIN — Medication 650 MILLIGRAM(S): at 11:11

## 2023-04-06 RX ADMIN — AMPICILLIN SODIUM AND SULBACTAM SODIUM 200 GRAM(S): 250; 125 INJECTION, POWDER, FOR SUSPENSION INTRAMUSCULAR; INTRAVENOUS at 17:27

## 2023-04-06 RX ADMIN — AMPICILLIN SODIUM AND SULBACTAM SODIUM 200 GRAM(S): 250; 125 INJECTION, POWDER, FOR SUSPENSION INTRAMUSCULAR; INTRAVENOUS at 13:47

## 2023-04-06 RX ADMIN — Medication 30 MILLIGRAM(S): at 21:16

## 2023-04-06 RX ADMIN — AMPICILLIN SODIUM AND SULBACTAM SODIUM 200 GRAM(S): 250; 125 INJECTION, POWDER, FOR SUSPENSION INTRAMUSCULAR; INTRAVENOUS at 05:20

## 2023-04-06 RX ADMIN — ENOXAPARIN SODIUM 40 MILLIGRAM(S): 100 INJECTION SUBCUTANEOUS at 17:26

## 2023-04-06 RX ADMIN — GABAPENTIN 100 MILLIGRAM(S): 400 CAPSULE ORAL at 05:19

## 2023-04-06 RX ADMIN — AMPICILLIN SODIUM AND SULBACTAM SODIUM 200 GRAM(S): 250; 125 INJECTION, POWDER, FOR SUSPENSION INTRAMUSCULAR; INTRAVENOUS at 23:03

## 2023-04-06 RX ADMIN — Medication 650 MILLIGRAM(S): at 05:34

## 2023-04-06 RX ADMIN — LOSARTAN POTASSIUM 100 MILLIGRAM(S): 100 TABLET, FILM COATED ORAL at 05:19

## 2023-04-06 RX ADMIN — GABAPENTIN 100 MILLIGRAM(S): 400 CAPSULE ORAL at 13:47

## 2023-04-06 RX ADMIN — Medication 650 MILLIGRAM(S): at 23:03

## 2023-04-06 RX ADMIN — GABAPENTIN 300 MILLIGRAM(S): 400 CAPSULE ORAL at 21:01

## 2023-04-06 RX ADMIN — Medication 30 MILLIGRAM(S): at 15:27

## 2023-04-06 RX ADMIN — OXYCODONE HYDROCHLORIDE 5 MILLIGRAM(S): 5 TABLET ORAL at 09:46

## 2023-04-06 RX ADMIN — Medication 30 MILLIGRAM(S): at 21:01

## 2023-04-06 RX ADMIN — Medication 5 MILLIGRAM(S): at 21:01

## 2023-04-06 RX ADMIN — Medication 30 MILLIGRAM(S): at 05:56

## 2023-04-06 NOTE — PROGRESS NOTE ADULT - ASSESSMENT
63F with a PMHx of HTN, HLD, and DM s/p elective Pylorus Preserving Whipple. Patient tolerated procedure well, post operative patient was admitted to the SDU.  # [id number]     PLAN:   - DG to floor   - FLD   - DC IVFs   - Pain control   - Monitor JANICE output   - Drain Amylase collected   - Monitor FS   - GI/DVT ppx   - PT eval    [facility]  spectra [phone]

## 2023-04-07 LAB
ALBUMIN SERPL ELPH-MCNC: 3.2 G/DL — LOW (ref 3.5–5.2)
ALP SERPL-CCNC: 444 U/L — HIGH (ref 30–115)
ALT FLD-CCNC: 63 U/L — HIGH (ref 0–41)
ANION GAP SERPL CALC-SCNC: 10 MMOL/L — SIGNIFICANT CHANGE UP (ref 7–14)
AST SERPL-CCNC: 93 U/L — HIGH (ref 0–41)
BASOPHILS # BLD AUTO: 0.02 K/UL — SIGNIFICANT CHANGE UP (ref 0–0.2)
BASOPHILS NFR BLD AUTO: 0.3 % — SIGNIFICANT CHANGE UP (ref 0–1)
BILIRUB DIRECT SERPL-MCNC: 0.2 MG/DL — SIGNIFICANT CHANGE UP (ref 0–0.3)
BILIRUB INDIRECT FLD-MCNC: 0.6 MG/DL — SIGNIFICANT CHANGE UP (ref 0.2–1.2)
BILIRUB SERPL-MCNC: 0.8 MG/DL — SIGNIFICANT CHANGE UP (ref 0.2–1.2)
BUN SERPL-MCNC: 7 MG/DL — LOW (ref 10–20)
CALCIUM SERPL-MCNC: 8 MG/DL — LOW (ref 8.4–10.5)
CHLORIDE SERPL-SCNC: 106 MMOL/L — SIGNIFICANT CHANGE UP (ref 98–110)
CO2 SERPL-SCNC: 24 MMOL/L — SIGNIFICANT CHANGE UP (ref 17–32)
CREAT SERPL-MCNC: 0.6 MG/DL — LOW (ref 0.7–1.5)
EGFR: 101 ML/MIN/1.73M2 — SIGNIFICANT CHANGE UP
EOSINOPHIL # BLD AUTO: 0.1 K/UL — SIGNIFICANT CHANGE UP (ref 0–0.7)
EOSINOPHIL NFR BLD AUTO: 1.5 % — SIGNIFICANT CHANGE UP (ref 0–8)
GLUCOSE BLDC GLUCOMTR-MCNC: 119 MG/DL — HIGH (ref 70–99)
GLUCOSE BLDC GLUCOMTR-MCNC: 126 MG/DL — HIGH (ref 70–99)
GLUCOSE BLDC GLUCOMTR-MCNC: 141 MG/DL — HIGH (ref 70–99)
GLUCOSE BLDC GLUCOMTR-MCNC: 151 MG/DL — HIGH (ref 70–99)
GLUCOSE SERPL-MCNC: 115 MG/DL — HIGH (ref 70–99)
HCT VFR BLD CALC: 25 % — LOW (ref 37–47)
HGB BLD-MCNC: 8.5 G/DL — LOW (ref 12–16)
IMM GRANULOCYTES NFR BLD AUTO: 1.5 % — HIGH (ref 0.1–0.3)
LYMPHOCYTES # BLD AUTO: 1.01 K/UL — LOW (ref 1.2–3.4)
LYMPHOCYTES # BLD AUTO: 14.7 % — LOW (ref 20.5–51.1)
MAGNESIUM SERPL-MCNC: 2 MG/DL — SIGNIFICANT CHANGE UP (ref 1.8–2.4)
MCHC RBC-ENTMCNC: 31.4 PG — HIGH (ref 27–31)
MCHC RBC-ENTMCNC: 34 G/DL — SIGNIFICANT CHANGE UP (ref 32–37)
MCV RBC AUTO: 92.3 FL — SIGNIFICANT CHANGE UP (ref 81–99)
MONOCYTES # BLD AUTO: 0.49 K/UL — SIGNIFICANT CHANGE UP (ref 0.1–0.6)
MONOCYTES NFR BLD AUTO: 7.1 % — SIGNIFICANT CHANGE UP (ref 1.7–9.3)
NEUTROPHILS # BLD AUTO: 5.16 K/UL — SIGNIFICANT CHANGE UP (ref 1.4–6.5)
NEUTROPHILS NFR BLD AUTO: 74.9 % — SIGNIFICANT CHANGE UP (ref 42.2–75.2)
NRBC # BLD: 0 /100 WBCS — SIGNIFICANT CHANGE UP (ref 0–0)
PHOSPHATE SERPL-MCNC: 2.8 MG/DL — SIGNIFICANT CHANGE UP (ref 2.1–4.9)
PLATELET # BLD AUTO: 223 K/UL — SIGNIFICANT CHANGE UP (ref 130–400)
POTASSIUM SERPL-MCNC: 3.7 MMOL/L — SIGNIFICANT CHANGE UP (ref 3.5–5)
POTASSIUM SERPL-SCNC: 3.7 MMOL/L — SIGNIFICANT CHANGE UP (ref 3.5–5)
PROT SERPL-MCNC: 5.5 G/DL — LOW (ref 6–8)
RBC # BLD: 2.71 M/UL — LOW (ref 4.2–5.4)
RBC # FLD: 11.9 % — SIGNIFICANT CHANGE UP (ref 11.5–14.5)
SODIUM SERPL-SCNC: 140 MMOL/L — SIGNIFICANT CHANGE UP (ref 135–146)
WBC # BLD: 6.88 K/UL — SIGNIFICANT CHANGE UP (ref 4.8–10.8)
WBC # FLD AUTO: 6.88 K/UL — SIGNIFICANT CHANGE UP (ref 4.8–10.8)

## 2023-04-07 RX ORDER — MAGNESIUM SULFATE 500 MG/ML
1 VIAL (ML) INJECTION ONCE
Refills: 0 | Status: COMPLETED | OUTPATIENT
Start: 2023-04-07 | End: 2023-04-07

## 2023-04-07 RX ORDER — ACETAMINOPHEN 500 MG
1000 TABLET ORAL ONCE
Refills: 0 | Status: COMPLETED | OUTPATIENT
Start: 2023-04-07 | End: 2023-04-07

## 2023-04-07 RX ADMIN — GABAPENTIN 300 MILLIGRAM(S): 400 CAPSULE ORAL at 05:05

## 2023-04-07 RX ADMIN — Medication 650 MILLIGRAM(S): at 05:35

## 2023-04-07 RX ADMIN — GABAPENTIN 300 MILLIGRAM(S): 400 CAPSULE ORAL at 13:20

## 2023-04-07 RX ADMIN — Medication 100 GRAM(S): at 05:08

## 2023-04-07 RX ADMIN — OXYCODONE HYDROCHLORIDE 5 MILLIGRAM(S): 5 TABLET ORAL at 05:00

## 2023-04-07 RX ADMIN — Medication 4: at 16:56

## 2023-04-07 RX ADMIN — AMPICILLIN SODIUM AND SULBACTAM SODIUM 200 GRAM(S): 250; 125 INJECTION, POWDER, FOR SUSPENSION INTRAMUSCULAR; INTRAVENOUS at 11:56

## 2023-04-07 RX ADMIN — LOSARTAN POTASSIUM 100 MILLIGRAM(S): 100 TABLET, FILM COATED ORAL at 05:05

## 2023-04-07 RX ADMIN — AMPICILLIN SODIUM AND SULBACTAM SODIUM 200 GRAM(S): 250; 125 INJECTION, POWDER, FOR SUSPENSION INTRAMUSCULAR; INTRAVENOUS at 23:30

## 2023-04-07 RX ADMIN — Medication 5 MILLIGRAM(S): at 21:22

## 2023-04-07 RX ADMIN — ENOXAPARIN SODIUM 40 MILLIGRAM(S): 100 INJECTION SUBCUTANEOUS at 17:29

## 2023-04-07 RX ADMIN — Medication 1000 MILLIGRAM(S): at 23:35

## 2023-04-07 RX ADMIN — Medication 30 MILLIGRAM(S): at 13:20

## 2023-04-07 RX ADMIN — Medication 650 MILLIGRAM(S): at 05:05

## 2023-04-07 RX ADMIN — OXYCODONE HYDROCHLORIDE 5 MILLIGRAM(S): 5 TABLET ORAL at 04:30

## 2023-04-07 RX ADMIN — Medication 30 MILLIGRAM(S): at 23:35

## 2023-04-07 RX ADMIN — Medication 650 MILLIGRAM(S): at 17:28

## 2023-04-07 RX ADMIN — ATORVASTATIN CALCIUM 20 MILLIGRAM(S): 80 TABLET, FILM COATED ORAL at 21:21

## 2023-04-07 RX ADMIN — GABAPENTIN 300 MILLIGRAM(S): 400 CAPSULE ORAL at 21:21

## 2023-04-07 RX ADMIN — Medication 30 MILLIGRAM(S): at 05:05

## 2023-04-07 RX ADMIN — AMPICILLIN SODIUM AND SULBACTAM SODIUM 200 GRAM(S): 250; 125 INJECTION, POWDER, FOR SUSPENSION INTRAMUSCULAR; INTRAVENOUS at 05:07

## 2023-04-07 RX ADMIN — Medication 30 MILLIGRAM(S): at 21:22

## 2023-04-07 RX ADMIN — Medication 30 MILLIGRAM(S): at 05:20

## 2023-04-07 RX ADMIN — Medication 650 MILLIGRAM(S): at 11:53

## 2023-04-07 RX ADMIN — Medication 400 MILLIGRAM(S): at 23:30

## 2023-04-07 RX ADMIN — AMPICILLIN SODIUM AND SULBACTAM SODIUM 200 GRAM(S): 250; 125 INJECTION, POWDER, FOR SUSPENSION INTRAMUSCULAR; INTRAVENOUS at 17:28

## 2023-04-07 NOTE — PROGRESS NOTE ADULT - ASSESSMENT
A/P 63F with a PMHx of HTN, HLD, and DM s/p elective Pylorus Preserving Whipple. Patient tolerated procedure well POD#4   pt seen with daughter @ bedside without complaints, reports + flatus , no BM today. pt tolerating po diet.  continue current management   encourage incentive spirometry use and ambulation  f/u SW   continue close monitoring

## 2023-04-08 ENCOUNTER — TRANSCRIPTION ENCOUNTER (OUTPATIENT)
Age: 63
End: 2023-04-08

## 2023-04-08 VITALS
TEMPERATURE: 99 F | OXYGEN SATURATION: 96 % | SYSTOLIC BLOOD PRESSURE: 139 MMHG | RESPIRATION RATE: 18 BRPM | HEART RATE: 92 BPM | DIASTOLIC BLOOD PRESSURE: 75 MMHG

## 2023-04-08 LAB
GLUCOSE BLDC GLUCOMTR-MCNC: 126 MG/DL — HIGH (ref 70–99)
GLUCOSE BLDC GLUCOMTR-MCNC: 142 MG/DL — HIGH (ref 70–99)

## 2023-04-08 PROCEDURE — 93010 ELECTROCARDIOGRAM REPORT: CPT

## 2023-04-08 RX ORDER — ACETAMINOPHEN 500 MG
2 TABLET ORAL
Qty: 0 | Refills: 0 | DISCHARGE
Start: 2023-04-08

## 2023-04-08 RX ORDER — POTASSIUM PHOSPHATE, MONOBASIC POTASSIUM PHOSPHATE, DIBASIC 236; 224 MG/ML; MG/ML
30 INJECTION, SOLUTION INTRAVENOUS ONCE
Refills: 0 | Status: COMPLETED | OUTPATIENT
Start: 2023-04-08 | End: 2023-04-08

## 2023-04-08 RX ADMIN — LOSARTAN POTASSIUM 100 MILLIGRAM(S): 100 TABLET, FILM COATED ORAL at 05:28

## 2023-04-08 RX ADMIN — Medication 650 MILLIGRAM(S): at 05:31

## 2023-04-08 RX ADMIN — POTASSIUM PHOSPHATE, MONOBASIC POTASSIUM PHOSPHATE, DIBASIC 83.33 MILLIMOLE(S): 236; 224 INJECTION, SOLUTION INTRAVENOUS at 05:29

## 2023-04-08 RX ADMIN — GABAPENTIN 300 MILLIGRAM(S): 400 CAPSULE ORAL at 05:29

## 2023-04-08 RX ADMIN — AMPICILLIN SODIUM AND SULBACTAM SODIUM 200 GRAM(S): 250; 125 INJECTION, POWDER, FOR SUSPENSION INTRAMUSCULAR; INTRAVENOUS at 12:00

## 2023-04-08 RX ADMIN — Medication 650 MILLIGRAM(S): at 11:54

## 2023-04-08 RX ADMIN — Medication 30 MILLIGRAM(S): at 13:50

## 2023-04-08 RX ADMIN — AMPICILLIN SODIUM AND SULBACTAM SODIUM 200 GRAM(S): 250; 125 INJECTION, POWDER, FOR SUSPENSION INTRAMUSCULAR; INTRAVENOUS at 05:28

## 2023-04-08 RX ADMIN — Medication 30 MILLIGRAM(S): at 05:28

## 2023-04-08 RX ADMIN — OXYCODONE HYDROCHLORIDE 5 MILLIGRAM(S): 5 TABLET ORAL at 00:45

## 2023-04-08 RX ADMIN — Medication 30 MILLIGRAM(S): at 05:31

## 2023-04-08 RX ADMIN — Medication 650 MILLIGRAM(S): at 05:29

## 2023-04-08 RX ADMIN — OXYCODONE HYDROCHLORIDE 5 MILLIGRAM(S): 5 TABLET ORAL at 00:10

## 2023-04-08 RX ADMIN — GABAPENTIN 300 MILLIGRAM(S): 400 CAPSULE ORAL at 13:50

## 2023-04-08 NOTE — DISCHARGE NOTE PROVIDER - CARE PROVIDER_API CALL
Karyn Blanco (HEATHER)  Surgery  [address] [place] [address], [address] [place] [address]  Phone: [phone]  Fax: [phone]  Follow Up Time:

## 2023-04-08 NOTE — PROGRESS NOTE ADULT - ASSESSMENT
63F with a PMHx of HTN, HLD, and DM s/p elective Pylorus Preserving Whipple. Patient tolerated procedure well, post operative patient was admitted to the SDU.  # [id number]     PLAN:   - FLD   - Pain control   - Monitor JANICE output - L JANICE removed    - Drain Amylase collected   - Monitor FS   - GI/DVT ppx   - PT eval   - Likely d/c'd home 4/8    [facility]  spectra [phone]

## 2023-04-08 NOTE — DISCHARGE NOTE PROVIDER - NSDCFUADDINST_GEN_ALL_CORE_FT
follow up with Dr Blanco next week call office for appointment  follow up with Your PMD    no strenuous activity  Keep wound clean and dry  shower only    if experience fever , shortness of breath, chest pain, dizziness, vomiting, abdominal pain, bleeding or drainage from wound call MD or return to ED follow up with Dr Blanco next week for wound check  call office for appointment  follow up with Your PMD    no strenuous activity  Keep wound clean and dry  shower only    if experience fever , shortness of breath, chest pain, dizziness, vomiting, abdominal pain, bleeding or drainage from wound call MD or return to ED

## 2023-04-08 NOTE — DISCHARGE NOTE PROVIDER - HOSPITAL COURSE
This is a 63F with a PMHx of HTN, HLD, and DM presents to [facility] for elective surgery. pt underwent s/p Pylorus Preserving pancreaticoduodenectomy on 4/3/23.  Post operatively pt upgraded to SDU for close monitoring. pt treated medically. diet advanced as tolerated. pt evaluated by PT /rehab   On 4/8/23 pt without complaints. tolerated po diet, voided and ambulated. vital signs stable and labs reviewed. pt doing well and discharged home with VNS.   Pt advised to follow up with Dr Blanco in 1 week . Continue drain care. Resume home medications. Precaution provided

## 2023-04-08 NOTE — PROGRESS NOTE ADULT - SUBJECTIVE AND OBJECTIVE BOX
GENERAL SURGERY PROGRESS NOTE    Patient: JEREMIAS IVEY , 63y ([birth year])Female   MRN: [id number]  Location: [address] (Back) 025 A  Visit: 04-03-23 Inpatient  Date: 04-08-23 @ 01:15    Events of past 24 hours: L JANICE drain removed. C/O abdominal pain that is controlled with medications with occasional breakthrough pain. C/O CP overnight (same CP had while in SICU - referred pain from abdomen). Passing flatus no bowel movements.     PAST MEDICAL & SURGICAL HISTORY:  HTN (hypertension)      High cholesterol      Diabetes      COPD (chronic obstructive pulmonary disease)      Kidney stones      No significant past surgical history          Vitals:   T(F): 98.8 (04-08-23 @ 00:08), Max: 99.8 (04-07-23 @ 21:20)  HR: 83 (04-08-23 @ 00:08)  BP: 145/75 (04-08-23 @ 00:08)  RR: 18 (04-08-23 @ 00:08)  SpO2: 95% (04-08-23 @ 00:08)      Diet, Full Liquid      Fluids:     I & O's:    04-06-23 @ 07:01  -  04-07-23 @ 07:00  --------------------------------------------------------  IN:  Total IN: 0 mL    OUT:    Bulb (mL): 85 mL    Bulb (mL): 65 mL    Voided (mL): 350 mL  Total OUT: 500 mL    Total NET: -500 mL    PHYSICAL EXAM:  General Appearance: AAOX3, NAD  Heart: RRR  Lungs: CTAx2  Abdomen:  soft, non tender, non distended, provena vac in place, RUQ/LUQ JPs w Serosanguineous output  MSK/Extremities:  mobile    MEDICATIONS  (STANDING):  acetaminophen     Tablet .. 650 milliGRAM(s) Oral every 6 hours  ampicillin/sulbactam  IVPB      ampicillin/sulbactam  IVPB 3 Gram(s) IV Intermittent every 6 hours  atorvastatin 20 milliGRAM(s) Oral at bedtime  dextrose 50% Injectable 25 Gram(s) IV Push once  enoxaparin Injectable 40 milliGRAM(s) SubCutaneous every 24 hours  gabapentin 300 milliGRAM(s) Oral every 8 hours  insulin lispro (ADMELOG) corrective regimen sliding scale   SubCutaneous Before meals and at bedtime  ketorolac   Injectable 30 milliGRAM(s) IV Push every 8 hours  losartan 100 milliGRAM(s) Oral daily  melatonin 5 milliGRAM(s) Oral at bedtime  potassium phosphate IVPB 30 milliMole(s) IV Intermittent once    MEDICATIONS  (PRN):  oxyCODONE    IR 5 milliGRAM(s) Oral every 6 hours PRN Moderate Pain (4 - 6)      DVT PROPHYLAXIS: enoxaparin Injectable 40 milliGRAM(s) SubCutaneous every 24 hours    GI PROPHYLAXIS:   ANTICOAGULATION:   ANTIBIOTICS:  ampicillin/sulbactam  IVPB    ampicillin/sulbactam  IVPB 3 Gram(s)            LAB/STUDIES:  Labs:  CAPILLARY BLOOD GLUCOSE      POCT Blood Glucose.: 119 mg/dL (07 Apr 2023 21:02)  POCT Blood Glucose.: 151 mg/dL (07 Apr 2023 16:50)  POCT Blood Glucose.: 141 mg/dL (07 Apr 2023 12:37)  POCT Blood Glucose.: 126 mg/dL (07 Apr 2023 07:50)                          8.5    6.88  )-----------( 223      ( 07 Apr 2023 21:09 )             25.0       Auto Neutrophil %: 74.9 % (04-07-23 @ 21:09)  Auto Immature Granulocyte %: 1.5 % (04-07-23 @ 21:09)    04-07    140  |  106  |  7<L>  ----------------------------<  115<H>  3.7   |  24  |  0.6<L>      Magnesium, Serum: 2.0 mg/dL (04-07-23 @ 21:09)      LFTs:             5.5  | 0.8  | 93       ------------------[444     ( 07 Apr 2023 21:09 )  3.2  | 0.2  | 63          Lipase:x      Amylase:x      
GENERAL SURGERY PROGRESS NOTE    Admission: Other pancreatic disorder    Hospital Day: 3  Post operative day: 2  Pancreatic mass   s/p Pancreaticoduodenostomy  Pylorus-sparing pancreaticoduodenectomy  Block, transversus abdominis plane, bilateral    24 Hour Events:  No acute events  Pain controlled  JANICE drains in place with serosanguinous discharge  Seen resting comfortably in bed, requested melatonin for sleep    Vitals:  T(F): 100.1 (04-04-23 @ 23:25), Max: 100.1 (04-04-23 @ 23:25)  HR: 92 (04-04-23 @ 20:00)  BP: 115/58 (04-04-23 @ 20:00)  RR: 20 (04-04-23 @ 20:00)  SpO2: 96% (04-05-23 @ 07:42)    Diet, NPO:   Except Medications  With Ice Chips/Sips of Water    Fluids:   I & O's:    04-04-23 @ 07:01  -  04-05-23 @ 07:00  --------------------------------------------------------  IN:    IV PiggyBack: 200 mL    Lactated Ringers: 1320 mL  Total IN: 1520 mL    OUT:    Bulb (mL): 70 mL    Bulb (mL): 180 mL    Voided (mL): 500 mL  Total OUT: 750 mL    Total NET: 770 mL    PHYSICAL EXAM:  General: NAD  Cardiac: RRR  Respiratory: unlabored breathing at rest  Abdomen: Soft, non-distended, non-tender, no rebound, no guarding.  Musculoskeletal: FROM in b/l UE and LE  Neuro: Sensation grossly intact and equal throughout, no focal deficits  Skin: Warm/dry, normal color, no jaundice  Incision/wound: Clean, dry, and intact. JANICE drains in place    MEDICATIONS  (STANDING):  acetaminophen     Tablet .. 650 milliGRAM(s) Oral every 6 hours  ampicillin/sulbactam  IVPB      ampicillin/sulbactam  IVPB 3 Gram(s) IV Intermittent every 6 hours  atorvastatin 20 milliGRAM(s) Oral at bedtime  dextrose 50% Injectable 25 Gram(s) IV Push once  enoxaparin Injectable 40 milliGRAM(s) SubCutaneous every 24 hours  gabapentin 100 milliGRAM(s) Oral every 8 hours  insulin lispro (ADMELOG) corrective regimen sliding scale   SubCutaneous every 4 hours  ketorolac   Injectable 15 milliGRAM(s) IV Push every 6 hours  lactated ringers. 1000 milliLiter(s) (110 mL/Hr) IV Continuous <Continuous>  losartan 100 milliGRAM(s) Oral daily  melatonin 5 milliGRAM(s) Oral at bedtime    MEDICATIONS  (PRN):  oxyCODONE    IR 5 milliGRAM(s) Oral every 6 hours PRN Moderate Pain (4 - 6)    DVT PROPHYLAXIS: enoxaparin Injectable 40 milliGRAM(s) SubCutaneous every 24 hours    GI PROPHYLAXIS:   ANTICOAGULATION:   ANTIBIOTICS:  ampicillin/sulbactam  IVPB    ampicillin/sulbactam  IVPB 3 Gram(s)    LAB/STUDIES:  Labs:  CAPILLARY BLOOD GLUCOSE    POCT Blood Glucose.: 131 mg/dL (05 Apr 2023 07:16)  POCT Blood Glucose.: 132 mg/dL (04 Apr 2023 21:49)  POCT Blood Glucose.: 133 mg/dL (04 Apr 2023 17:05)  POCT Blood Glucose.: 145 mg/dL (04 Apr 2023 14:44)  POCT Blood Glucose.: 170 mg/dL (04 Apr 2023 10:28)                      9.7    7.50  )-----------( 172      ( 05 Apr 2023 05:51 )             29.5       Auto Neutrophil %: 79.2 % (04-05-23 @ 05:51)  Auto Immature Granulocyte %: 0.4 % (04-05-23 @ 05:51)  Auto Neutrophil %: 77.6 % (04-04-23 @ 16:21)  Auto Immature Granulocyte %: 0.3 % (04-04-23 @ 16:21)    04-05    138  |  104  |  14  ----------------------------<  140<H>  4.3   |  24  |  0.7    Calcium, Total Serum: 7.9 mg/dL (04-05-23 @ 05:51)    LFTs:             x    | x    | x        ------------------[x       ( 03 Apr 2023 23:50 )  x    | x    | x           Lipase:256    Amylase:243       Lactate, Blood: 1.6 mmol/L (04-04-23 @ 16:21)  Lactate, Blood: 3.1 mmol/L (04-03-23 @ 23:50)  Lactate, Blood: 5.8 mmol/L (04-03-23 @ 19:40)    Coags:     12.70  ----< 1.11    ( 03 Apr 2023 19:40 )     31.1     CARDIAC MARKERS ( 03 Apr 2023 23:50 )  x     / <0.01 ng/mL / x     / x     / 2.4 ng/mL      Culture - Body Fluid with Gram Stain (collected 03 Apr 2023 12:40)  Source: .Body Fluid None  Gram Stain (04 Apr 2023 05:15):    No polymorphonuclear cells seen    No organisms seen    by cytocentrifuge
GENERAL SURGERY PROGRESS NOTE    Patient: JEREMIAS IVEY , 63y ([birth year])Female   MRN: [id number]  Location: [address] (Back) 025 A  Visit: 04-03-23 Inpatient  Date: 04-06-23 @ 02:58      Procedure/Dx/Injuries: s/p pancreaticoduodenectomy     Events of past 24 hours: DG to floor, drain amylase collected, ambulating, +gas +bm    PAST MEDICAL & SURGICAL HISTORY:  HTN (hypertension)      High cholesterol      Diabetes      COPD (chronic obstructive pulmonary disease)      Kidney stones      No significant past surgical history          Vitals:   T(F): 97.1 (04-06-23 @ 00:48), Max: 99.9 (04-05-23 @ 20:57)  HR: 99 (04-06-23 @ 00:48)  BP: 118/58 (04-06-23 @ 00:48)  RR: 18 (04-06-23 @ 00:48)  SpO2: 95% (04-06-23 @ 00:48)      Diet, Full Liquid      Fluids:     I & O's:    04-04-23 @ 07:01  -  04-05-23 @ 07:00  --------------------------------------------------------  IN:    IV PiggyBack: 200 mL    Lactated Ringers: 1430 mL  Total IN: 1630 mL    OUT:    Bulb (mL): 70 mL    Bulb (mL): 180 mL    Voided (mL): 500 mL  Total OUT: 750 mL    Total NET: 880 mL          PHYSICAL EXAM:  General Appearance: AAOX3, NAD  Heart: RRR  Lungs: CTAx2  Abdomen:  soft, non tender, non distended, provena vac in place, RUQ/LUQ JPs w Serosanguineous output  MSK/Extremities:  mobile  S    MEDICATIONS  (STANDING):  acetaminophen     Tablet .. 650 milliGRAM(s) Oral every 6 hours  ampicillin/sulbactam  IVPB      ampicillin/sulbactam  IVPB 3 Gram(s) IV Intermittent every 6 hours  atorvastatin 20 milliGRAM(s) Oral at bedtime  dextrose 50% Injectable 25 Gram(s) IV Push once  enoxaparin Injectable 40 milliGRAM(s) SubCutaneous every 24 hours  gabapentin 100 milliGRAM(s) Oral every 8 hours  insulin lispro (ADMELOG) corrective regimen sliding scale   SubCutaneous Before meals and at bedtime  ketorolac   Injectable 30 milliGRAM(s) IV Push every 8 hours  losartan 100 milliGRAM(s) Oral daily  melatonin 5 milliGRAM(s) Oral at bedtime    MEDICATIONS  (PRN):  oxyCODONE    IR 5 milliGRAM(s) Oral every 6 hours PRN Moderate Pain (4 - 6)      DVT PROPHYLAXIS: enoxaparin Injectable 40 milliGRAM(s) SubCutaneous every 24 hours    GI PROPHYLAXIS:   ANTICOAGULATION:   ANTIBIOTICS:  ampicillin/sulbactam  IVPB    ampicillin/sulbactam  IVPB 3 Gram(s)            LAB/STUDIES:  Labs:  CAPILLARY BLOOD GLUCOSE      POCT Blood Glucose.: 126 mg/dL (05 Apr 2023 20:51)  POCT Blood Glucose.: 122 mg/dL (05 Apr 2023 16:48)  POCT Blood Glucose.: 150 mg/dL (05 Apr 2023 13:41)  POCT Blood Glucose.: 116 mg/dL (05 Apr 2023 09:44)  POCT Blood Glucose.: 131 mg/dL (05 Apr 2023 07:16)                          8.7    6.24  )-----------( 165      ( 06 Apr 2023 00:51 )             26.1       Auto Neutrophil %: 78.1 % (04-06-23 @ 00:51)  Auto Immature Granulocyte %: 0.6 % (04-06-23 @ 00:51)  Auto Neutrophil %: 79.2 % (04-05-23 @ 05:51)  Auto Immature Granulocyte %: 0.4 % (04-05-23 @ 05:51)    04-06    141  |  106  |  10  ----------------------------<  118<H>  4.1   |  23  |  0.6<L>      Calcium, Total Serum: 7.8 mg/dL (04-06-23 @ 00:51)      LFTs:             5.0  | 1.0  | 28       ------------------[90      ( 06 Apr 2023 00:51 )  3.1  | 0.2  | 27          Lipase:94     Amylase:88        Lactate, Blood: 1.6 mmol/L (04-04-23 @ 16:21)  Lactate, Blood: 3.1 mmol/L (04-03-23 @ 23:50)  Lactate, Blood: 5.8 mmol/L (04-03-23 @ 19:40)      Coags:                Culture - Body Fluid with Gram Stain (collected 03 Apr 2023 12:40)  Source: .Body Fluid None  Gram Stain (04 Apr 2023 05:15):    No polymorphonuclear cells seen    No organisms seen    by cytocentrifuge  Preliminary Report (05 Apr 2023 12:18):    No growth to date.              IMAGING:      ACCESS/ DEVICES:  [x ] Peripheral IV  [ ] Central Venous Line	[ ] R	[ ] L	[ ] IJ	[ ] Fem	[ ] SC	Placed:   [ ] Arterial Line		[ ] R	[ ] L	[ ] Fem	[ ] Rad	[ ] Ax	Placed:   [ ] PICC:					[ ] Mediport  [ ] Urinary Catheter,  Date Placed:   [ ] Chest tube: [ ] Right, [ ] Left  [ ] JANICE/Emmett Drains      
GENERAL SURGERY PROGRESS NOTE    Patient: JEREMIAS IVEY , 63y ([birth year])Female   MRN: [id number]  Location: [address]  Visit: 04-03-23 Inpatient  Date: 04-04-23 @ 12:37      Procedure/Dx/Injuries: s/p pancreatiduodenectomy    Events of past 24 hours: s/p surgical intervention, Mcadams DC, lactate trending down, 1L bolus total given     PAST MEDICAL & SURGICAL HISTORY:  HTN (hypertension)      High cholesterol      Diabetes      COPD (chronic obstructive pulmonary disease)      Kidney stones      No significant past surgical history          Vitals:   T(F): 98.1 (04-04-23 @ 07:32), Max: 98.8 (04-03-23 @ 23:53)  HR: 77 (04-04-23 @ 07:32)  BP: 107/60 (04-04-23 @ 07:32)  RR: 18 (04-04-23 @ 07:32)  SpO2: 99% (04-04-23 @ 07:32)      Diet, NPO:   Except Medications     Special Instructions for Nursing:  Except Medications      Fluids: lactated ringers.: Solution, 1000 milliLiter(s) infuse at 110 mL/Hr      I & O's:    04-03-23 @ 07:01  -  04-04-23 @ 07:00  --------------------------------------------------------  IN:    Lactated Ringers: 1100 mL    Lactated Ringers: 150 mL  Total IN: 1250 mL    OUT:    Bulb (mL): 65 mL    Bulb (mL): 65 mL    Indwelling Catheter - Urethral (mL): 615 mL  Total OUT: 745 mL    Total NET: 505 mL          PHYSICAL EXAM:  General Appearance: AAOX3, NAD  Heart: RRR  Lungs: CTAx2  Abdomen:  soft, mild tenderness at epigastric area, provena vac over wound, 2 drains RUQ and LUQ w serosanguineous output  MSK/Extremities:  mobile      MEDICATIONS  (STANDING):  acetaminophen     Tablet .. 650 milliGRAM(s) Oral every 6 hours  ampicillin/sulbactam  IVPB      ampicillin/sulbactam  IVPB 3 Gram(s) IV Intermittent every 6 hours  atorvastatin 20 milliGRAM(s) Oral at bedtime  dextrose 50% Injectable 25 Gram(s) IV Push once  enoxaparin Injectable 40 milliGRAM(s) SubCutaneous every 24 hours  gabapentin 100 milliGRAM(s) Oral every 8 hours  insulin lispro (ADMELOG) corrective regimen sliding scale   SubCutaneous every 4 hours  ketorolac   Injectable 15 milliGRAM(s) IV Push every 6 hours  lactated ringers. 1000 milliLiter(s) (110 mL/Hr) IV Continuous <Continuous>  losartan 100 milliGRAM(s) Oral daily    MEDICATIONS  (PRN):  oxyCODONE    IR 5 milliGRAM(s) Oral every 6 hours PRN Moderate Pain (4 - 6)      DVT PROPHYLAXIS: enoxaparin Injectable 40 milliGRAM(s) SubCutaneous every 24 hours    GI PROPHYLAXIS:   ANTICOAGULATION:   ANTIBIOTICS:  ampicillin/sulbactam  IVPB    ampicillin/sulbactam  IVPB 3 Gram(s)            LAB/STUDIES:  Labs:  CAPILLARY BLOOD GLUCOSE      POCT Blood Glucose.: 170 mg/dL (04 Apr 2023 10:28)  POCT Blood Glucose.: 140 mg/dL (04 Apr 2023 05:41)  POCT Blood Glucose.: 143 mg/dL (04 Apr 2023 02:56)  POCT Blood Glucose.: 219 mg/dL (03 Apr 2023 21:49)  POCT Blood Glucose.: 247 mg/dL (03 Apr 2023 18:09)  POCT Blood Glucose.: 222 mg/dL (03 Apr 2023 16:08)  POCT Blood Glucose.: 167 mg/dL (03 Apr 2023 13:43)                          13.8   9.47  )-----------( 221      ( 03 Apr 2023 19:40 )             41.1       Auto Neutrophil %: 85.7 % (04-03-23 @ 19:40)  Auto Immature Granulocyte %: 0.2 % (04-03-23 @ 19:40)    04-03    140  |  103  |  15  ----------------------------<  245<H>  4.2   |  19  |  0.9      Calcium, Total Serum: 8.9 mg/dL (04-03-23 @ 19:40)      LFTs:             x    | x    | x        ------------------[x       ( 03 Apr 2023 23:50 )  x    | x    | x           Lipase:256    Amylase:243       Lactate, Blood: 3.1 mmol/L (04-03-23 @ 23:50)  Lactate, Blood: 5.8 mmol/L (04-03-23 @ 19:40)      Coags:     12.70  ----< 1.11    ( 03 Apr 2023 19:40 )     31.1        CARDIAC MARKERS ( 03 Apr 2023 23:50 )  x     / <0.01 ng/mL / x     / x     / 2.4 ng/mL              Culture - Body Fluid with Gram Stain (collected 03 Apr 2023 12:40)  Source: .Body Fluid None  Gram Stain (04 Apr 2023 05:15):    No polymorphonuclear cells seen    No organisms seen    by cytocentrifuge              IMAGING:      ACCESS/ DEVICES:  [x ] Peripheral IV  [ ] Central Venous Line	[ ] R	[ ] L	[ ] IJ	[ ] Fem	[ ] SC	Placed:   [ ] Arterial Line		[ ] R	[ ] L	[ ] Fem	[ ] Rad	[ ] Ax	Placed:   [ ] PICC:					[ ] Mediport  [ ] Urinary Catheter,  Date Placed:   [ ] Chest tube: [ ] Right, [ ] Left  [xx ] JANICE/Emmett Drains      
Procedure/ diagnosis:s/p pancreaticoduodenectomy   POD#4    PAST MEDICAL & SURGICAL HISTORY:  HTN (hypertension)      High cholesterol      Diabetes      COPD (chronic obstructive pulmonary disease)      Kidney stones      No significant past surgical history    24H EVENTS    Vital Signs Last 24 Hrs  T(C): 36.8 (2023 05:07), Max: 36.8 (2023 15:54)  T(F): 98.3 (2023 05:07), Max: 98.3 (2023 05:07)  HR: 91 (2023 05:07) (89 - 97)  BP: 135/74 (2023 05:07) (129/68 - 154/86)  BP(mean): --  RR: 18 (2023 05:07) (18 - 18)  SpO2: 95% (2023 05:07) (95% - 100%)      I&O's Detail    2023 07:01  -  2023 07:00  --------------------------------------------------------  IN:  Total IN: 0 mL    OUT:    Bulb (mL): 85 mL    Bulb (mL): 65 mL    Voided (mL): 350 mL  Total OUT: 500 mL    Total NET: -500 mL               8.3    7.02  )-----------( 187      (  @ 21:31 )             25.4                8.7    6.24  )-----------( 165      (  @ 00:51 )             26.1                    138   |  103   |  10                 Ca: 8.1    BMP:   ----------------------------< 121    M.9   (23 @ 21:31)             4.1    |  22    | 0.6                Ph: 2.6      LFT:     TPro: 5.1 / Alb: 3.0 / TBili: 0.7 / DBili: 0.2 / AST: 25 / ALT: 23 / AlkPhos: 201   (23 @ 21:31)      MEDICATIONS  (STANDING):  acetaminophen     Tablet .. 650 milliGRAM(s) Oral every 6 hours  ampicillin/sulbactam  IVPB      ampicillin/sulbactam  IVPB 3 Gram(s) IV Intermittent every 6 hours  atorvastatin 20 milliGRAM(s) Oral at bedtime  dextrose 50% Injectable 25 Gram(s) IV Push once  enoxaparin Injectable 40 milliGRAM(s) SubCutaneous every 24 hours  gabapentin 300 milliGRAM(s) Oral every 8 hours  insulin lispro (ADMELOG) corrective regimen sliding scale   SubCutaneous Before meals and at bedtime  ketorolac   Injectable 30 milliGRAM(s) IV Push every 8 hours  losartan 100 milliGRAM(s) Oral daily  melatonin 5 milliGRAM(s) Oral at bedtime    MEDICATIONS  (PRN):  oxyCODONE    IR 5 milliGRAM(s) Oral every 6 hours PRN Moderate Pain (4 - 6)      Diet, Full Liquid (23 @ 15:43)    PHYSICAL EXAM:  General Appearance: pt in NAD  Chest: Equal expansion bilaterally, equal breath sounds  CV: S1, S2  Abdomen: Soft, provena vac and drain in place    NT, ND, no rebound tenderness no guarding   Extremities: + ROM  Neuro: A&Ox3

## 2023-04-08 NOTE — DISCHARGE NOTE PROVIDER - NSDCMRMEDTOKEN_GEN_ALL_CORE_FT
acetaminophen 325 mg oral tablet: 2 tab(s) orally every 6 hours as needed for  pain  Benicar: orally once a day  Lipitor: orally once a day  metFORMIN:

## 2023-04-08 NOTE — DISCHARGE NOTE PROVIDER - NSDCCPTREATMENT_GEN_ALL_CORE_FT
PRINCIPAL PROCEDURE  Procedure: Pylorus-sparing pancreaticoduodenectomy  Findings and Treatment:       SECONDARY PROCEDURE  Procedure: Block, transversus abdominis plane, bilateral  Findings and Treatment:

## 2023-04-09 ENCOUNTER — INPATIENT (INPATIENT)
Facility: HOSPITAL | Age: 63
LOS: 7 days | Discharge: HOME HEALTH | DRG: 252 | End: 2023-04-17
Attending: SURGERY | Admitting: SURGERY
Payer: MEDICAID

## 2023-04-09 VITALS
RESPIRATION RATE: 20 BRPM | HEART RATE: 110 BPM | SYSTOLIC BLOOD PRESSURE: 137 MMHG | HEIGHT: 63 IN | DIASTOLIC BLOOD PRESSURE: 72 MMHG | WEIGHT: 151.9 LBS | OXYGEN SATURATION: 100 % | TEMPERATURE: 98 F

## 2023-04-09 DIAGNOSIS — R10.9 UNSPECIFIED ABDOMINAL PAIN: ICD-10-CM

## 2023-04-09 LAB
ALBUMIN SERPL ELPH-MCNC: 3.3 G/DL — LOW (ref 3.5–5.2)
ALP SERPL-CCNC: 1146 U/L — HIGH (ref 30–115)
ALT FLD-CCNC: 439 U/L — HIGH (ref 0–41)
ANION GAP SERPL CALC-SCNC: 18 MMOL/L — HIGH (ref 7–14)
APTT BLD: 34.1 SEC — SIGNIFICANT CHANGE UP (ref 27–39.2)
AST SERPL-CCNC: 618 U/L — HIGH (ref 0–41)
BASOPHILS # BLD AUTO: 0 K/UL — SIGNIFICANT CHANGE UP (ref 0–0.2)
BASOPHILS # BLD AUTO: 0.04 K/UL — SIGNIFICANT CHANGE UP (ref 0–0.2)
BASOPHILS NFR BLD AUTO: 0 % — SIGNIFICANT CHANGE UP (ref 0–1)
BASOPHILS NFR BLD AUTO: 0.5 % — SIGNIFICANT CHANGE UP (ref 0–1)
BILIRUB DIRECT SERPL-MCNC: 3 MG/DL — HIGH (ref 0–0.3)
BILIRUB INDIRECT FLD-MCNC: 0.3 MG/DL — SIGNIFICANT CHANGE UP (ref 0.2–1.2)
BILIRUB SERPL-MCNC: 3.3 MG/DL — HIGH (ref 0.2–1.2)
BLD GP AB SCN SERPL QL: SIGNIFICANT CHANGE UP
BUN SERPL-MCNC: 9 MG/DL — LOW (ref 10–20)
BURR CELLS BLD QL SMEAR: SIGNIFICANT CHANGE UP
CALCIUM SERPL-MCNC: 8.9 MG/DL — SIGNIFICANT CHANGE UP (ref 8.4–10.5)
CHLORIDE SERPL-SCNC: 107 MMOL/L — SIGNIFICANT CHANGE UP (ref 98–110)
CO2 SERPL-SCNC: 14 MMOL/L — LOW (ref 17–32)
CREAT SERPL-MCNC: 0.6 MG/DL — LOW (ref 0.7–1.5)
CULTURE RESULTS: SIGNIFICANT CHANGE UP
EGFR: 101 ML/MIN/1.73M2 — SIGNIFICANT CHANGE UP
EOSINOPHIL # BLD AUTO: 0 K/UL — SIGNIFICANT CHANGE UP (ref 0–0.7)
EOSINOPHIL # BLD AUTO: 0.03 K/UL — SIGNIFICANT CHANGE UP (ref 0–0.7)
EOSINOPHIL NFR BLD AUTO: 0 % — SIGNIFICANT CHANGE UP (ref 0–8)
EOSINOPHIL NFR BLD AUTO: 0.4 % — SIGNIFICANT CHANGE UP (ref 0–8)
GIANT PLATELETS BLD QL SMEAR: PRESENT — SIGNIFICANT CHANGE UP
GLUCOSE BLDC GLUCOMTR-MCNC: 147 MG/DL — HIGH (ref 70–99)
GLUCOSE SERPL-MCNC: 177 MG/DL — HIGH (ref 70–99)
HCT VFR BLD CALC: 22.5 % — LOW (ref 37–47)
HCT VFR BLD CALC: 26.1 % — LOW (ref 37–47)
HGB BLD-MCNC: 7.5 G/DL — LOW (ref 12–16)
HGB BLD-MCNC: 8.6 G/DL — LOW (ref 12–16)
HYPOCHROMIA BLD QL: SLIGHT — SIGNIFICANT CHANGE UP
IMM GRANULOCYTES NFR BLD AUTO: 4.9 % — HIGH (ref 0.1–0.3)
INR BLD: 1.13 RATIO — SIGNIFICANT CHANGE UP (ref 0.65–1.3)
LIDOCAIN IGE QN: 431 U/L — HIGH (ref 7–60)
LYMPHOCYTES # BLD AUTO: 0.9 K/UL — LOW (ref 1.2–3.4)
LYMPHOCYTES # BLD AUTO: 1.02 K/UL — LOW (ref 1.2–3.4)
LYMPHOCYTES # BLD AUTO: 12.4 % — LOW (ref 20.5–51.1)
LYMPHOCYTES # BLD AUTO: 13.9 % — LOW (ref 20.5–51.1)
MANUAL SMEAR VERIFICATION: SIGNIFICANT CHANGE UP
MCHC RBC-ENTMCNC: 30.6 PG — SIGNIFICANT CHANGE UP (ref 27–31)
MCHC RBC-ENTMCNC: 30.9 PG — SIGNIFICANT CHANGE UP (ref 27–31)
MCHC RBC-ENTMCNC: 33 G/DL — SIGNIFICANT CHANGE UP (ref 32–37)
MCHC RBC-ENTMCNC: 33.3 G/DL — SIGNIFICANT CHANGE UP (ref 32–37)
MCV RBC AUTO: 92.6 FL — SIGNIFICANT CHANGE UP (ref 81–99)
MCV RBC AUTO: 92.9 FL — SIGNIFICANT CHANGE UP (ref 81–99)
METAMYELOCYTES # FLD: 0.9 % — HIGH (ref 0–0)
MONOCYTES # BLD AUTO: 0.52 K/UL — SIGNIFICANT CHANGE UP (ref 0.1–0.6)
MONOCYTES # BLD AUTO: 0.59 K/UL — SIGNIFICANT CHANGE UP (ref 0.1–0.6)
MONOCYTES NFR BLD AUTO: 7.1 % — SIGNIFICANT CHANGE UP (ref 1.7–9.3)
MONOCYTES NFR BLD AUTO: 8 % — SIGNIFICANT CHANGE UP (ref 1.7–9.3)
NEUTROPHILS # BLD AUTO: 5.29 K/UL — SIGNIFICANT CHANGE UP (ref 1.4–6.5)
NEUTROPHILS # BLD AUTO: 5.74 K/UL — SIGNIFICANT CHANGE UP (ref 1.4–6.5)
NEUTROPHILS NFR BLD AUTO: 72.3 % — SIGNIFICANT CHANGE UP (ref 42.2–75.2)
NEUTROPHILS NFR BLD AUTO: 77 % — HIGH (ref 42.2–75.2)
NEUTS BAND # BLD: 1.7 % — SIGNIFICANT CHANGE UP (ref 0–6)
NRBC # BLD: 0 /100 WBCS — SIGNIFICANT CHANGE UP (ref 0–0)
OVALOCYTES BLD QL SMEAR: SLIGHT — SIGNIFICANT CHANGE UP
PLAT MORPH BLD: NORMAL — SIGNIFICANT CHANGE UP
PLATELET # BLD AUTO: 262 K/UL — SIGNIFICANT CHANGE UP (ref 130–400)
PLATELET # BLD AUTO: 295 K/UL — SIGNIFICANT CHANGE UP (ref 130–400)
POIKILOCYTOSIS BLD QL AUTO: SIGNIFICANT CHANGE UP
POLYCHROMASIA BLD QL SMEAR: SLIGHT — SIGNIFICANT CHANGE UP
POTASSIUM SERPL-MCNC: 4.6 MMOL/L — SIGNIFICANT CHANGE UP (ref 3.5–5)
POTASSIUM SERPL-SCNC: 4.6 MMOL/L — SIGNIFICANT CHANGE UP (ref 3.5–5)
PROMYELOCYTES # FLD: 0.9 % — HIGH (ref 0–0)
PROT SERPL-MCNC: 5.8 G/DL — LOW (ref 6–8)
PROTHROM AB SERPL-ACNC: 12.9 SEC — HIGH (ref 9.95–12.87)
RBC # BLD: 2.43 M/UL — LOW (ref 4.2–5.4)
RBC # BLD: 2.81 M/UL — LOW (ref 4.2–5.4)
RBC # FLD: 11.9 % — SIGNIFICANT CHANGE UP (ref 11.5–14.5)
RBC # FLD: 12.5 % — SIGNIFICANT CHANGE UP (ref 11.5–14.5)
RBC BLD AUTO: ABNORMAL
SARS-COV-2 RNA SPEC QL NAA+PROBE: SIGNIFICANT CHANGE UP
SODIUM SERPL-SCNC: 139 MMOL/L — SIGNIFICANT CHANGE UP (ref 135–146)
SPECIMEN SOURCE: SIGNIFICANT CHANGE UP
WBC # BLD: 7.29 K/UL — SIGNIFICANT CHANGE UP (ref 4.8–10.8)
WBC # BLD: 7.33 K/UL — SIGNIFICANT CHANGE UP (ref 4.8–10.8)
WBC # FLD AUTO: 7.29 K/UL — SIGNIFICANT CHANGE UP (ref 4.8–10.8)
WBC # FLD AUTO: 7.33 K/UL — SIGNIFICANT CHANGE UP (ref 4.8–10.8)

## 2023-04-09 PROCEDURE — 93010 ELECTROCARDIOGRAM REPORT: CPT

## 2023-04-09 PROCEDURE — 83690 ASSAY OF LIPASE: CPT

## 2023-04-09 PROCEDURE — 83735 ASSAY OF MAGNESIUM: CPT

## 2023-04-09 PROCEDURE — 82150 ASSAY OF AMYLASE: CPT

## 2023-04-09 PROCEDURE — 85025 COMPLETE CBC W/AUTO DIFF WBC: CPT

## 2023-04-09 PROCEDURE — 86900 BLOOD TYPING SEROLOGIC ABO: CPT

## 2023-04-09 PROCEDURE — 86901 BLOOD TYPING SEROLOGIC RH(D): CPT

## 2023-04-09 PROCEDURE — 36430 TRANSFUSION BLD/BLD COMPNT: CPT

## 2023-04-09 PROCEDURE — 74177 CT ABD & PELVIS W/CONTRAST: CPT | Mod: 26,MA

## 2023-04-09 PROCEDURE — 85027 COMPLETE CBC AUTOMATED: CPT

## 2023-04-09 PROCEDURE — 80076 HEPATIC FUNCTION PANEL: CPT

## 2023-04-09 PROCEDURE — 71045 X-RAY EXAM CHEST 1 VIEW: CPT

## 2023-04-09 PROCEDURE — C9113: CPT

## 2023-04-09 PROCEDURE — 86923 COMPATIBILITY TEST ELECTRIC: CPT

## 2023-04-09 PROCEDURE — 84100 ASSAY OF PHOSPHORUS: CPT

## 2023-04-09 PROCEDURE — 86803 HEPATITIS C AB TEST: CPT

## 2023-04-09 PROCEDURE — 82962 GLUCOSE BLOOD TEST: CPT

## 2023-04-09 PROCEDURE — P9016: CPT

## 2023-04-09 PROCEDURE — 76705 ECHO EXAM OF ABDOMEN: CPT

## 2023-04-09 PROCEDURE — 36415 COLL VENOUS BLD VENIPUNCTURE: CPT

## 2023-04-09 PROCEDURE — 86850 RBC ANTIBODY SCREEN: CPT

## 2023-04-09 PROCEDURE — 99285 EMERGENCY DEPT VISIT HI MDM: CPT

## 2023-04-09 PROCEDURE — 80074 ACUTE HEPATITIS PANEL: CPT

## 2023-04-09 PROCEDURE — 80048 BASIC METABOLIC PNL TOTAL CA: CPT

## 2023-04-09 PROCEDURE — 93005 ELECTROCARDIOGRAM TRACING: CPT

## 2023-04-09 PROCEDURE — 87040 BLOOD CULTURE FOR BACTERIA: CPT

## 2023-04-09 RX ORDER — ONDANSETRON 8 MG/1
4 TABLET, FILM COATED ORAL ONCE
Refills: 0 | Status: COMPLETED | OUTPATIENT
Start: 2023-04-09 | End: 2023-04-09

## 2023-04-09 RX ORDER — DEXTROSE 50 % IN WATER 50 %
15 SYRINGE (ML) INTRAVENOUS ONCE
Refills: 0 | Status: DISCONTINUED | OUTPATIENT
Start: 2023-04-09 | End: 2023-04-10

## 2023-04-09 RX ORDER — LOSARTAN POTASSIUM 100 MG/1
100 TABLET, FILM COATED ORAL DAILY
Refills: 0 | Status: DISCONTINUED | OUTPATIENT
Start: 2023-04-09 | End: 2023-04-10

## 2023-04-09 RX ORDER — SUCRALFATE 1 G
1 TABLET ORAL EVERY 6 HOURS
Refills: 0 | Status: DISCONTINUED | OUTPATIENT
Start: 2023-04-09 | End: 2023-04-17

## 2023-04-09 RX ORDER — DIATRIZOATE MEGLUMINE 180 MG/ML
30 INJECTION, SOLUTION INTRAVESICAL ONCE
Refills: 0 | Status: COMPLETED | OUTPATIENT
Start: 2023-04-09 | End: 2023-04-09

## 2023-04-09 RX ORDER — SODIUM CHLORIDE 9 MG/ML
1000 INJECTION, SOLUTION INTRAVENOUS
Refills: 0 | Status: DISCONTINUED | OUTPATIENT
Start: 2023-04-09 | End: 2023-04-11

## 2023-04-09 RX ORDER — HYDROMORPHONE HYDROCHLORIDE 2 MG/ML
0.5 INJECTION INTRAMUSCULAR; INTRAVENOUS; SUBCUTANEOUS EVERY 6 HOURS
Refills: 0 | Status: DISCONTINUED | OUTPATIENT
Start: 2023-04-09 | End: 2023-04-14

## 2023-04-09 RX ORDER — ERTAPENEM SODIUM 1 G/1
1000 INJECTION, POWDER, LYOPHILIZED, FOR SOLUTION INTRAMUSCULAR; INTRAVENOUS EVERY 24 HOURS
Refills: 0 | Status: DISCONTINUED | OUTPATIENT
Start: 2023-04-09 | End: 2023-04-10

## 2023-04-09 RX ORDER — INSULIN LISPRO 100/ML
VIAL (ML) SUBCUTANEOUS EVERY 6 HOURS
Refills: 0 | Status: DISCONTINUED | OUTPATIENT
Start: 2023-04-09 | End: 2023-04-13

## 2023-04-09 RX ORDER — PANTOPRAZOLE SODIUM 20 MG/1
40 TABLET, DELAYED RELEASE ORAL EVERY 12 HOURS
Refills: 0 | Status: DISCONTINUED | OUTPATIENT
Start: 2023-04-09 | End: 2023-04-17

## 2023-04-09 RX ORDER — SODIUM CHLORIDE 9 MG/ML
1000 INJECTION INTRAMUSCULAR; INTRAVENOUS; SUBCUTANEOUS ONCE
Refills: 0 | Status: COMPLETED | OUTPATIENT
Start: 2023-04-09 | End: 2023-04-09

## 2023-04-09 RX ORDER — PANTOPRAZOLE SODIUM 20 MG/1
80 TABLET, DELAYED RELEASE ORAL ONCE
Refills: 0 | Status: COMPLETED | OUTPATIENT
Start: 2023-04-09 | End: 2023-04-09

## 2023-04-09 RX ADMIN — PANTOPRAZOLE SODIUM 80 MILLIGRAM(S): 20 TABLET, DELAYED RELEASE ORAL at 14:38

## 2023-04-09 RX ADMIN — ONDANSETRON 4 MILLIGRAM(S): 8 TABLET, FILM COATED ORAL at 19:06

## 2023-04-09 RX ADMIN — DIATRIZOATE MEGLUMINE 30 MILLILITER(S): 180 INJECTION, SOLUTION INTRAVESICAL at 14:38

## 2023-04-09 RX ADMIN — SODIUM CHLORIDE 110 MILLILITER(S): 9 INJECTION, SOLUTION INTRAVENOUS at 19:07

## 2023-04-09 RX ADMIN — HYDROMORPHONE HYDROCHLORIDE 0.5 MILLIGRAM(S): 2 INJECTION INTRAMUSCULAR; INTRAVENOUS; SUBCUTANEOUS at 19:36

## 2023-04-09 RX ADMIN — SODIUM CHLORIDE 1000 MILLILITER(S): 9 INJECTION INTRAMUSCULAR; INTRAVENOUS; SUBCUTANEOUS at 14:39

## 2023-04-09 RX ADMIN — HYDROMORPHONE HYDROCHLORIDE 0.5 MILLIGRAM(S): 2 INJECTION INTRAMUSCULAR; INTRAVENOUS; SUBCUTANEOUS at 19:06

## 2023-04-09 NOTE — ED PROVIDER NOTE - PROGRESS NOTE DETAILS
Authored by Dr. Sweet: consulted surg who will evaluate pt, pending CT scan w oral/IV contrast ED Attending DELMA Gandara  surgery report admission Dr. Blanco, pt and family aware.

## 2023-04-09 NOTE — ED ADULT TRIAGE NOTE - CHIEF COMPLAINT QUOTE
BIBA from home, pt had 2 episodes of dark tarry stools since having a pancreaticoduodenectomy on 4/3. pt c/o abdominal pain

## 2023-04-09 NOTE — ED PROVIDER NOTE - CLINICAL SUMMARY MEDICAL DECISION MAKING FREE TEXT BOX
63-year-old female with past medical history of diabetes, high blood pressure, hyperlipidemia, pancreatic mass right status post  pylorus sparing pancreaticoduodenectomy w Dr. Blanco on 4/3/23 admitted from April 3 to April 8 discharged home with VNS, drain care presents to the emergency department for abdominal pain that started yesterday evening associated with black tarry stool and weakness.  Pain is sharp, constant, moderate intensity, nonradiating, no alleviating or precipitating factors.  Associated symptoms of nausea and palpitations.  Patient not on anticoagulation.  Next appointment was this upcoming Wednesday.  No fever, chills, v, cp,  pleuritic cp, sob,  diaphoresis, cough, ha/lh/dizziness, numbness/tingling, neck pain/ stiffness, diarrhea, constipation,  urinary symptoms, trauma, weakness, edema, calf pain/swelling/erythema, sick contacts, recent travel or rash.    On Exam:  Vital Signs: I have reviewed the initial vital signs. Constitutional: weak appearing pt sitting on stretcher speaking full sentences. Integumentary: No rash. EYES: Pallor. ENT: Dry MM NECK: Supple, non-tender, no meningeal signs. Cardiovascular: Tachy, radial pulses 2/4 b/l. No JVD. Respiratory: BS present b/l, ctabl, no wheezing or crackles, no accessory muscle use, no stridor. Gastrointestinal: BS present throughout all 4 quadrants, soft, nd, generaled tenderness to palpation, no rebound tenderness or guarding, no cvat.  (+) wound vac and R JANICE drain, no erythema  or streaking, no warmth to palpation, no crepitus, induration, fluctuance, no discharge, no signs of trauma, no abscess. Rectal done with supervision with Dr. Sweet: no melena or brbpr. good tone. Musculoskeletal: FROM, no edema, no calf pain/swelling/erythema. Neurologic: AAOx3, motor 5/5 and sensation intact throughout upper and lowe ext, CN II-XII intact, No facial droop or slurring of speech. No focal deficits.    Plan: Monitor, EKG, CXR, labs, ivf, Protonix, surgery consult, imaging, reassess.    Labs and EKG were ordered and reviewed.  Imaging was ordered and reviewed by me.  Appropriate medications for patient's presenting complaints were ordered and effects were reassessed.  Patient's records (prior hospital, ED visit,) were reviewed.  Additional history was obtained from EMS, family.  Escalation to admission/observation was considered. Patient requires inpatient hospitalization - monitored setting. Surgery report admission to their service. I have fully discussed the medical management and delivery of care with the patient and family. I have discussed any available labs, imaging and treatment options with the patient.  Pt admitted for further care & management.

## 2023-04-09 NOTE — H&P ADULT - HISTORY OF PRESENT ILLNESS
63y F presents s/p Pancreaticoduodenectomy on 4/3/2023 for lightheadedness and recent episodes of melanotic stools. Denies fevers/chills, chest pain, shortness of breath, syncope. She states that last night she had an episode of nausea and retching without vomiting and increasing abdominal pain.

## 2023-04-09 NOTE — H&P ADULT - NSHPLABSRESULTS_GEN_ALL_CORE
8.6    7.29  )-----------( 295      ( 09 Apr 2023 14:39 )             26.1     04-09    139  |  107  |  9<L>  ----------------------------<  177<H>  4.6   |  14<L>  |  0.6<L>    Ca    8.9      09 Apr 2023 14:39  Phos  2.8     04-07  Mg     2.0     04-07    TPro  5.8<L>  /  Alb  3.3<L>  /  TBili  3.3<H>  /  DBili  3.0<H>  /  AST  618<H>  /  ALT  439<H>  /  AlkPhos  1146<H>  04-09

## 2023-04-09 NOTE — ED PROVIDER NOTE - CARE PLAN
1 Principal Discharge DX:	Abdominal pain  Secondary Diagnosis:	H/O pancreaticoduodenectomy  Secondary Diagnosis:	Anemia

## 2023-04-09 NOTE — PATIENT PROFILE ADULT - FALL HARM RISK - HARM RISK INTERVENTIONS

## 2023-04-09 NOTE — H&P ADULT - ATTENDING COMMENTS
Pt examined  labs and images reviewed  had Pylorus preserving PancreaticoDuodenectoy  Discharged POD#5  Was tolerating diet  Was having bowel movement    Sent to ER for olivier POD #6 Readmitted    vitals stable  Blood work shows   Obstructive jaundice and Pancreatitis  CT reports normal - but afferent limb appears obstructed  HJ and PJ stent inplace    will admit    Afferent limb syndrome  repeat labs  if nor improvement    will get EGD  if no resolution   will have to redo DuodenoJejunostomy

## 2023-04-09 NOTE — ED PROVIDER NOTE - PHYSICAL EXAMINATION
VITAL SIGNS: I have reviewed nursing notes and confirm.  CONSTITUTIONAL: non-toxic, + ill appearing  SKIN: no rash, no petechiae. + pallor  EYES: PERRL, EOMI, + pale conjunctiva, anicteric  ENT: tongue midline, no exudates  NECK: Supple; no meningismus  CARD: + tachycardia, no murmurs, equal radial pulses bilaterally 2+  RESP: CTAB, no respiratory distress  ABD: Soft, non-distended, + diffuse abd ttp especially noted around site of wound vac, + R JANICE stent in place w serosanguinous drainage  EXT: No edema. No calves tenderness  NEURO: Alert, oriented. no focal deficits

## 2023-04-09 NOTE — ED PROVIDER NOTE - NS_EDPROVIDERDISPOUSERTYPE_ED_A_ED
I spoke to the patient this morning about her recent urine culture result was negative and the urine analysis which was positive for pyuria microhematuria as well as squamous epithelial cells transitional epithelial cells and renal tubular epithelial cells.  I reviewed with her the normal KUB and renal ultrasound from 2017.  We discussed diagnostic possibilities and I recommended a CT urogram  for further evaluation.  We may also consider a cystoscopy and further urologic evaluation as well.  The patient has a follow-up office appointment to see me on July 6, 2020.  She will call for any concerns in the interval.  At present she is asymptomatic   Attending Attestation (For Attendings USE Only)...

## 2023-04-09 NOTE — H&P ADULT - NSHPPHYSICALEXAM_GEN_ALL_CORE
General: Awake, alert, Oriented to person/place/time. No acute distress  Neuro: CN II-XII grossly intact, no focal deficits  HEENT: EOMI, No scleral icterus. Trachea midline  Lung: Non-labored on room air, no wheezes/rhonchi  Cardiovascular: RRR by radial pulse, normotensive. No Jugular venous distension  Abdomen: Soft, mild generalized tenderness, non-distended. No masses or organomegaly  Extremities: no clubbing/cyanosis/edema  Skin: warm and well perfused. Capillary refill < 2 seconds  Psych: appropriate mood and affect

## 2023-04-09 NOTE — ED ADULT NURSE NOTE - PAIN RATING/NUMBER SCALE (0-10): ACTIVITY
3 (mild pain)
Alert-The patient is alert, awake and responds to voice. The patient is oriented to time, place, and person. The triage nurse is able to obtain subjective information.

## 2023-04-09 NOTE — ED PROVIDER NOTE - OBJECTIVE STATEMENT
63-year-old female with past medical history of hypertension diabetes hyperlipidemia and recently diagnosed with a pancreatic mass status post pylorus sparing pancreaticoduodenectomy presents to the emergency department 63-year-old female with past medical history of hypertension diabetes hyperlipidemia and recently diagnosed with a pancreatic mass status post pylorus sparing pancreaticoduodenectomy w Dr. Blanco on 4/3 and was discharged yesterday presents to the emergency department w severe abdominal pain since last night. Per daughter, pt has been having black tarry stool a/w pallor. Denies previous known history of blood transfusion. Pt has wound vac as well as R JANICE stent in place (L JANICE stent was removed prior to discharge). Pt has upcoming apt this coming Wednesday for removal wound vac and remaining stent. Sx are also associated w nausea and palpitations. Denies fever, chills, SOB, v/d, dysuria, hematochezia.

## 2023-04-09 NOTE — ED ADULT NURSE NOTE - OBJECTIVE STATEMENT
Patient Aox4, discharged yesterday s/p pancreaticoduodenectomy, complaining of 2 episodes of bl;ack tarry stool, VNS sent patient in.

## 2023-04-09 NOTE — H&P ADULT - ASSESSMENT
63yFemale with hx of Pancreatic head IPMN, s/p pancreaticoduodenectomy, presenting with Postoperative pain, and melanotic stools    Recommendations/plan are/is  as follows:    -Admit to Surgery, admitting attending Dr. Blanco  -Multimodal pain control  -Pulmonary: Incentive Spirometry q1 hr while awake  -GI/FEN: , Monitor Strict Intake/Output [id number], Replete Electrolytes PRN,   -Renal: BUN/Cr- 9/0.6  7/0.6  -ABX:  Continue home Ertapenem   -PPX: BID PPI    -Additional Consultations: Gastroenterology for Endoscopic evaluation of Gastrojejunostomy as well as afferent limb

## 2023-04-09 NOTE — ED PROVIDER NOTE - ATTENDING CONTRIBUTION TO CARE
63-year-old female with past medical history of diabetes, high blood pressure, hyperlipidemia, pancreatic mass right status post  pylorus sparing pancreaticoduodenectomy w Dr. Blanco on 4/3/23 admitted from April 3 to April 8 discharged home with VNS, drain care presents to the emergency department for abdominal pain that started yesterday evening associated with black tarry stool and weakness.  Pain is sharp, constant, moderate intensity, nonradiating, no alleviating or precipitating factors.  Associated symptoms of nausea and palpitations.  Patient not on anticoagulation.  Next appointment was this upcoming Wednesday.  No fever, chills, v, cp,  pleuritic cp, sob,  diaphoresis, cough, ha/lh/dizziness, numbness/tingling, neck pain/ stiffness, diarrhea, constipation,  urinary symptoms, trauma, weakness, edema, calf pain/swelling/erythema, sick contacts, recent travel or rash.    On Exam:  Vital Signs: I have reviewed the initial vital signs. Constitutional: weak appearing pt sitting on stretcher speaking full sentences. Integumentary: No rash. EYES: Pallor. ENT: Dry MM NECK: Supple, non-tender, no meningeal signs. Cardiovascular: Tachy, radial pulses 2/4 b/l. No JVD. Respiratory: BS present b/l, ctabl, no wheezing or crackles, no accessory muscle use, no stridor. Gastrointestinal: BS present throughout all 4 quadrants, soft, nd, generaled tenderness to palpation, no rebound tenderness or guarding, no cvat.  (+) wound vac and R JANICE drain, no erythema  or streaking, no warmth to palpation, no crepitus, induration, fluctuance, no discharge, no signs of trauma, no abscess. Rectal done with supervision with Dr. Sweet: no melena or brbpr. good tone. Musculoskeletal: FROM, no edema, no calf pain/swelling/erythema. Neurologic: AAOx3, motor 5/5 and sensation intact throughout upper and lowe ext, CN II-XII intact, No facial droop or slurring of speech. No focal deficits.    Plan: Monitor, EKG, CXR, labs, ivf, Protonix, surgery consult, imaging, reassess.

## 2023-04-10 ENCOUNTER — TRANSCRIPTION ENCOUNTER (OUTPATIENT)
Age: 63
End: 2023-04-10

## 2023-04-10 PROBLEM — I10 ESSENTIAL (PRIMARY) HYPERTENSION: Chronic | Status: ACTIVE | Noted: 2022-12-23

## 2023-04-10 PROBLEM — N20.0 CALCULUS OF KIDNEY: Chronic | Status: ACTIVE | Noted: 2023-03-13

## 2023-04-10 PROBLEM — E11.9 TYPE 2 DIABETES MELLITUS WITHOUT COMPLICATIONS: Chronic | Status: ACTIVE | Noted: 2022-12-23

## 2023-04-10 PROBLEM — E78.00 PURE HYPERCHOLESTEROLEMIA, UNSPECIFIED: Chronic | Status: ACTIVE | Noted: 2022-12-23

## 2023-04-10 PROBLEM — J44.9 CHRONIC OBSTRUCTIVE PULMONARY DISEASE, UNSPECIFIED: Chronic | Status: ACTIVE | Noted: 2023-03-13

## 2023-04-10 LAB
ALBUMIN SERPL ELPH-MCNC: 3 G/DL — LOW (ref 3.5–5.2)
ALBUMIN SERPL ELPH-MCNC: 3 G/DL — LOW (ref 3.5–5.2)
ALBUMIN SERPL ELPH-MCNC: 3.1 G/DL — LOW (ref 3.5–5.2)
ALP SERPL-CCNC: 1050 U/L — HIGH (ref 30–115)
ALP SERPL-CCNC: 1108 U/L — HIGH (ref 30–115)
ALP SERPL-CCNC: 952 U/L — HIGH (ref 30–115)
ALT FLD-CCNC: 387 U/L — HIGH (ref 0–41)
ALT FLD-CCNC: 484 U/L — HIGH (ref 0–41)
ALT FLD-CCNC: 604 U/L — HIGH (ref 0–41)
ANION GAP SERPL CALC-SCNC: 13 MMOL/L — SIGNIFICANT CHANGE UP (ref 7–14)
ANION GAP SERPL CALC-SCNC: 13 MMOL/L — SIGNIFICANT CHANGE UP (ref 7–14)
ANION GAP SERPL CALC-SCNC: 17 MMOL/L — HIGH (ref 7–14)
AST SERPL-CCNC: 222 U/L — HIGH (ref 0–41)
AST SERPL-CCNC: 676 U/L — HIGH (ref 0–41)
AST SERPL-CCNC: 736 U/L — HIGH (ref 0–41)
BASOPHILS # BLD AUTO: 0.01 K/UL — SIGNIFICANT CHANGE UP (ref 0–0.2)
BASOPHILS # BLD AUTO: 0.02 K/UL — SIGNIFICANT CHANGE UP (ref 0–0.2)
BASOPHILS NFR BLD AUTO: 0.1 % — SIGNIFICANT CHANGE UP (ref 0–1)
BASOPHILS NFR BLD AUTO: 0.2 % — SIGNIFICANT CHANGE UP (ref 0–1)
BILIRUB DIRECT SERPL-MCNC: 0.6 MG/DL — HIGH (ref 0–0.3)
BILIRUB DIRECT SERPL-MCNC: 2.1 MG/DL — HIGH (ref 0–0.3)
BILIRUB DIRECT SERPL-MCNC: 3.1 MG/DL — HIGH (ref 0–0.3)
BILIRUB INDIRECT FLD-MCNC: 0.3 MG/DL — SIGNIFICANT CHANGE UP (ref 0.2–1.2)
BILIRUB INDIRECT FLD-MCNC: 0.3 MG/DL — SIGNIFICANT CHANGE UP (ref 0.2–1.2)
BILIRUB INDIRECT FLD-MCNC: 0.6 MG/DL — SIGNIFICANT CHANGE UP (ref 0.2–1.2)
BILIRUB SERPL-MCNC: 1.2 MG/DL — SIGNIFICANT CHANGE UP (ref 0.2–1.2)
BILIRUB SERPL-MCNC: 2.4 MG/DL — HIGH (ref 0.2–1.2)
BILIRUB SERPL-MCNC: 3.4 MG/DL — HIGH (ref 0.2–1.2)
BUN SERPL-MCNC: 7 MG/DL — LOW (ref 10–20)
BUN SERPL-MCNC: 8 MG/DL — LOW (ref 10–20)
BUN SERPL-MCNC: 9 MG/DL — LOW (ref 10–20)
CALCIUM SERPL-MCNC: 8.2 MG/DL — LOW (ref 8.4–10.5)
CALCIUM SERPL-MCNC: 8.4 MG/DL — SIGNIFICANT CHANGE UP (ref 8.4–10.5)
CALCIUM SERPL-MCNC: 8.8 MG/DL — SIGNIFICANT CHANGE UP (ref 8.4–10.5)
CHLORIDE SERPL-SCNC: 107 MMOL/L — SIGNIFICANT CHANGE UP (ref 98–110)
CHLORIDE SERPL-SCNC: 107 MMOL/L — SIGNIFICANT CHANGE UP (ref 98–110)
CHLORIDE SERPL-SCNC: 108 MMOL/L — SIGNIFICANT CHANGE UP (ref 98–110)
CO2 SERPL-SCNC: 14 MMOL/L — LOW (ref 17–32)
CO2 SERPL-SCNC: 18 MMOL/L — SIGNIFICANT CHANGE UP (ref 17–32)
CO2 SERPL-SCNC: 18 MMOL/L — SIGNIFICANT CHANGE UP (ref 17–32)
CREAT SERPL-MCNC: 0.6 MG/DL — LOW (ref 0.7–1.5)
EGFR: 101 ML/MIN/1.73M2 — SIGNIFICANT CHANGE UP
EOSINOPHIL # BLD AUTO: 0.03 K/UL — SIGNIFICANT CHANGE UP (ref 0–0.7)
EOSINOPHIL # BLD AUTO: 0.04 K/UL — SIGNIFICANT CHANGE UP (ref 0–0.7)
EOSINOPHIL NFR BLD AUTO: 0.4 % — SIGNIFICANT CHANGE UP (ref 0–8)
EOSINOPHIL NFR BLD AUTO: 0.4 % — SIGNIFICANT CHANGE UP (ref 0–8)
GLUCOSE BLDC GLUCOMTR-MCNC: 159 MG/DL — HIGH (ref 70–99)
GLUCOSE BLDC GLUCOMTR-MCNC: 162 MG/DL — HIGH (ref 70–99)
GLUCOSE BLDC GLUCOMTR-MCNC: 171 MG/DL — HIGH (ref 70–99)
GLUCOSE SERPL-MCNC: 139 MG/DL — HIGH (ref 70–99)
GLUCOSE SERPL-MCNC: 143 MG/DL — HIGH (ref 70–99)
GLUCOSE SERPL-MCNC: 148 MG/DL — HIGH (ref 70–99)
HCT VFR BLD CALC: 21.1 % — LOW (ref 37–47)
HCT VFR BLD CALC: 22 % — LOW (ref 37–47)
HCV AB S/CO SERPL IA: 0.03 COI — SIGNIFICANT CHANGE UP
HCV AB SERPL-IMP: SIGNIFICANT CHANGE UP
HGB BLD-MCNC: 7.2 G/DL — LOW (ref 12–16)
HGB BLD-MCNC: 7.2 G/DL — LOW (ref 12–16)
IMM GRANULOCYTES NFR BLD AUTO: 2.4 % — HIGH (ref 0.1–0.3)
IMM GRANULOCYTES NFR BLD AUTO: 4.8 % — HIGH (ref 0.1–0.3)
LYMPHOCYTES # BLD AUTO: 0.8 K/UL — LOW (ref 1.2–3.4)
LYMPHOCYTES # BLD AUTO: 1.2 K/UL — SIGNIFICANT CHANGE UP (ref 1.2–3.4)
LYMPHOCYTES # BLD AUTO: 10.1 % — LOW (ref 20.5–51.1)
LYMPHOCYTES # BLD AUTO: 12.9 % — LOW (ref 20.5–51.1)
MAGNESIUM SERPL-MCNC: 1.7 MG/DL — LOW (ref 1.8–2.4)
MAGNESIUM SERPL-MCNC: 1.8 MG/DL — SIGNIFICANT CHANGE UP (ref 1.8–2.4)
MCHC RBC-ENTMCNC: 30.5 PG — SIGNIFICANT CHANGE UP (ref 27–31)
MCHC RBC-ENTMCNC: 30.7 PG — SIGNIFICANT CHANGE UP (ref 27–31)
MCHC RBC-ENTMCNC: 32.7 G/DL — SIGNIFICANT CHANGE UP (ref 32–37)
MCHC RBC-ENTMCNC: 33.2 G/DL — SIGNIFICANT CHANGE UP (ref 32–37)
MCV RBC AUTO: 92.5 FL — SIGNIFICANT CHANGE UP (ref 81–99)
MCV RBC AUTO: 93.2 FL — SIGNIFICANT CHANGE UP (ref 81–99)
MONOCYTES # BLD AUTO: 0.46 K/UL — SIGNIFICANT CHANGE UP (ref 0.1–0.6)
MONOCYTES # BLD AUTO: 0.52 K/UL — SIGNIFICANT CHANGE UP (ref 0.1–0.6)
MONOCYTES NFR BLD AUTO: 5.6 % — SIGNIFICANT CHANGE UP (ref 1.7–9.3)
MONOCYTES NFR BLD AUTO: 5.8 % — SIGNIFICANT CHANGE UP (ref 1.7–9.3)
NEUTROPHILS # BLD AUTO: 6.23 K/UL — SIGNIFICANT CHANGE UP (ref 1.4–6.5)
NEUTROPHILS # BLD AUTO: 7.33 K/UL — HIGH (ref 1.4–6.5)
NEUTROPHILS NFR BLD AUTO: 78.5 % — HIGH (ref 42.2–75.2)
NEUTROPHILS NFR BLD AUTO: 78.8 % — HIGH (ref 42.2–75.2)
NRBC # BLD: 0 /100 WBCS — SIGNIFICANT CHANGE UP (ref 0–0)
NRBC # BLD: 0 /100 WBCS — SIGNIFICANT CHANGE UP (ref 0–0)
PHOSPHATE SERPL-MCNC: 2.4 MG/DL — SIGNIFICANT CHANGE UP (ref 2.1–4.9)
PHOSPHATE SERPL-MCNC: 3.4 MG/DL — SIGNIFICANT CHANGE UP (ref 2.1–4.9)
PLATELET # BLD AUTO: 274 K/UL — SIGNIFICANT CHANGE UP (ref 130–400)
PLATELET # BLD AUTO: 288 K/UL — SIGNIFICANT CHANGE UP (ref 130–400)
POTASSIUM SERPL-MCNC: 3.7 MMOL/L — SIGNIFICANT CHANGE UP (ref 3.5–5)
POTASSIUM SERPL-MCNC: 4.1 MMOL/L — SIGNIFICANT CHANGE UP (ref 3.5–5)
POTASSIUM SERPL-MCNC: 4.1 MMOL/L — SIGNIFICANT CHANGE UP (ref 3.5–5)
POTASSIUM SERPL-SCNC: 3.7 MMOL/L — SIGNIFICANT CHANGE UP (ref 3.5–5)
POTASSIUM SERPL-SCNC: 4.1 MMOL/L — SIGNIFICANT CHANGE UP (ref 3.5–5)
POTASSIUM SERPL-SCNC: 4.1 MMOL/L — SIGNIFICANT CHANGE UP (ref 3.5–5)
PROT SERPL-MCNC: 5.1 G/DL — LOW (ref 6–8)
PROT SERPL-MCNC: 5.4 G/DL — LOW (ref 6–8)
PROT SERPL-MCNC: 5.5 G/DL — LOW (ref 6–8)
RBC # BLD: 2.28 M/UL — LOW (ref 4.2–5.4)
RBC # BLD: 2.36 M/UL — LOW (ref 4.2–5.4)
RBC # FLD: 12.2 % — SIGNIFICANT CHANGE UP (ref 11.5–14.5)
RBC # FLD: 12.3 % — SIGNIFICANT CHANGE UP (ref 11.5–14.5)
SODIUM SERPL-SCNC: 138 MMOL/L — SIGNIFICANT CHANGE UP (ref 135–146)
SODIUM SERPL-SCNC: 138 MMOL/L — SIGNIFICANT CHANGE UP (ref 135–146)
SODIUM SERPL-SCNC: 139 MMOL/L — SIGNIFICANT CHANGE UP (ref 135–146)
WBC # BLD: 7.91 K/UL — SIGNIFICANT CHANGE UP (ref 4.8–10.8)
WBC # BLD: 9.33 K/UL — SIGNIFICANT CHANGE UP (ref 4.8–10.8)
WBC # FLD AUTO: 7.91 K/UL — SIGNIFICANT CHANGE UP (ref 4.8–10.8)
WBC # FLD AUTO: 9.33 K/UL — SIGNIFICANT CHANGE UP (ref 4.8–10.8)

## 2023-04-10 PROCEDURE — 99223 1ST HOSP IP/OBS HIGH 75: CPT | Mod: 25

## 2023-04-10 PROCEDURE — 43235 EGD DIAGNOSTIC BRUSH WASH: CPT

## 2023-04-10 RX ORDER — PIPERACILLIN AND TAZOBACTAM 4; .5 G/20ML; G/20ML
3.38 INJECTION, POWDER, LYOPHILIZED, FOR SOLUTION INTRAVENOUS EVERY 8 HOURS
Refills: 0 | Status: DISCONTINUED | OUTPATIENT
Start: 2023-04-11 | End: 2023-04-17

## 2023-04-10 RX ORDER — SODIUM CHLORIDE 9 MG/ML
250 INJECTION INTRAMUSCULAR; INTRAVENOUS; SUBCUTANEOUS ONCE
Refills: 0 | Status: DISCONTINUED | OUTPATIENT
Start: 2023-04-10 | End: 2023-04-11

## 2023-04-10 RX ORDER — PIPERACILLIN AND TAZOBACTAM 4; .5 G/20ML; G/20ML
3.38 INJECTION, POWDER, LYOPHILIZED, FOR SOLUTION INTRAVENOUS ONCE
Refills: 0 | Status: COMPLETED | OUTPATIENT
Start: 2023-04-10 | End: 2023-04-10

## 2023-04-10 RX ORDER — LOSARTAN POTASSIUM 100 MG/1
100 TABLET, FILM COATED ORAL DAILY
Refills: 0 | Status: DISCONTINUED | OUTPATIENT
Start: 2023-04-10 | End: 2023-04-10

## 2023-04-10 RX ORDER — ENOXAPARIN SODIUM 100 MG/ML
40 INJECTION SUBCUTANEOUS EVERY 24 HOURS
Refills: 0 | Status: DISCONTINUED | OUTPATIENT
Start: 2023-04-10 | End: 2023-04-17

## 2023-04-10 RX ORDER — ACETAMINOPHEN 500 MG
1000 TABLET ORAL ONCE
Refills: 0 | Status: COMPLETED | OUTPATIENT
Start: 2023-04-10 | End: 2023-04-11

## 2023-04-10 RX ORDER — PIPERACILLIN AND TAZOBACTAM 4; .5 G/20ML; G/20ML
3.38 INJECTION, POWDER, LYOPHILIZED, FOR SOLUTION INTRAVENOUS ONCE
Refills: 0 | Status: COMPLETED | OUTPATIENT
Start: 2023-04-11 | End: 2023-04-11

## 2023-04-10 RX ORDER — SODIUM CHLORIDE 9 MG/ML
500 INJECTION, SOLUTION INTRAVENOUS ONCE
Refills: 0 | Status: COMPLETED | OUTPATIENT
Start: 2023-04-10 | End: 2023-04-10

## 2023-04-10 RX ADMIN — HYDROMORPHONE HYDROCHLORIDE 0.5 MILLIGRAM(S): 2 INJECTION INTRAMUSCULAR; INTRAVENOUS; SUBCUTANEOUS at 08:33

## 2023-04-10 RX ADMIN — Medication 1 GRAM(S): at 01:28

## 2023-04-10 RX ADMIN — ERTAPENEM SODIUM 120 MILLIGRAM(S): 1 INJECTION, POWDER, LYOPHILIZED, FOR SOLUTION INTRAMUSCULAR; INTRAVENOUS at 01:28

## 2023-04-10 RX ADMIN — Medication 1 GRAM(S): at 06:06

## 2023-04-10 RX ADMIN — PIPERACILLIN AND TAZOBACTAM 25 GRAM(S): 4; .5 INJECTION, POWDER, LYOPHILIZED, FOR SOLUTION INTRAVENOUS at 19:00

## 2023-04-10 RX ADMIN — PIPERACILLIN AND TAZOBACTAM 200 GRAM(S): 4; .5 INJECTION, POWDER, LYOPHILIZED, FOR SOLUTION INTRAVENOUS at 17:00

## 2023-04-10 RX ADMIN — LOSARTAN POTASSIUM 100 MILLIGRAM(S): 100 TABLET, FILM COATED ORAL at 06:06

## 2023-04-10 RX ADMIN — PANTOPRAZOLE SODIUM 40 MILLIGRAM(S): 20 TABLET, DELAYED RELEASE ORAL at 06:05

## 2023-04-10 RX ADMIN — HYDROMORPHONE HYDROCHLORIDE 0.5 MILLIGRAM(S): 2 INJECTION INTRAMUSCULAR; INTRAVENOUS; SUBCUTANEOUS at 01:31

## 2023-04-10 RX ADMIN — Medication 1 GRAM(S): at 17:58

## 2023-04-10 RX ADMIN — SODIUM CHLORIDE 500 MILLILITER(S): 9 INJECTION, SOLUTION INTRAVENOUS at 15:11

## 2023-04-10 RX ADMIN — Medication 2: at 16:55

## 2023-04-10 RX ADMIN — SODIUM CHLORIDE 110 MILLILITER(S): 9 INJECTION, SOLUTION INTRAVENOUS at 01:42

## 2023-04-10 RX ADMIN — Medication 2: at 06:12

## 2023-04-10 RX ADMIN — HYDROMORPHONE HYDROCHLORIDE 0.5 MILLIGRAM(S): 2 INJECTION INTRAMUSCULAR; INTRAVENOUS; SUBCUTANEOUS at 01:46

## 2023-04-10 RX ADMIN — PANTOPRAZOLE SODIUM 40 MILLIGRAM(S): 20 TABLET, DELAYED RELEASE ORAL at 17:58

## 2023-04-10 RX ADMIN — HYDROMORPHONE HYDROCHLORIDE 0.5 MILLIGRAM(S): 2 INJECTION INTRAMUSCULAR; INTRAVENOUS; SUBCUTANEOUS at 19:57

## 2023-04-10 RX ADMIN — HYDROMORPHONE HYDROCHLORIDE 0.5 MILLIGRAM(S): 2 INJECTION INTRAMUSCULAR; INTRAVENOUS; SUBCUTANEOUS at 19:27

## 2023-04-10 NOTE — PROGRESS NOTE ADULT - SUBJECTIVE AND OBJECTIVE BOX
GENERAL SURGERY PROGRESS NOTE    Admission: Abdominal pain    Hospital Day: 2    24 Hour Events:  Last dark tarry stool 4pm yesterday.  N/V(-)  Reports 5-6/10 abdominal pain at rest in the epigastric area.   OOB+    Vitals:  T(F): 98.3 (04-10-23 @ 00:54), Max: 99.4 (04-09-23 @ 14:08)  HR: 98 (04-10-23 @ 00:54)  BP: 105/59 (04-10-23 @ 00:54)  RR: 18 (04-10-23 @ 00:54)  SpO2: 95% (04-10-23 @ 00:54)    Diet, NPO:   Except Medications  With Ice Chips/Sips of Water    Fluids: lactated ringers.: Solution, 1000 milliLiter(s) infuse at 110 mL/Hr    I & O's:    PHYSICAL EXAM:  General: NAD  Cardiac: RRR  Respiratory: unlabored breathing at rest  Abdomen: Soft, non-distended, moderate epigastric tenderness, no rebound, no guarding.  Musculoskeletal: FROM in b/l UE and LE  Neuro: Sensation grossly intact and equal throughout, no focal deficits  Skin: Warm/dry, normal color, no jaundice  Incision/wound: Prevena in place. R JANICE in place with ss fluid    MEDICATIONS  (STANDING):  ertapenem  IVPB 1000 milliGRAM(s) IV Intermittent every 24 hours  insulin lispro (ADMELOG) corrective regimen sliding scale   SubCutaneous every 6 hours  lactated ringers. 1000 milliLiter(s) (110 mL/Hr) IV Continuous <Continuous>  losartan 100 milliGRAM(s) Oral daily  pantoprazole  Injectable 40 milliGRAM(s) IV Push every 12 hours  sucralfate suspension 1 Gram(s) Oral every 6 hours    MEDICATIONS  (PRN):  dextrose Oral Gel 15 Gram(s) Oral once PRN Blood Glucose LESS THAN 70 milliGRAM(s)/deciliter  HYDROmorphone  Injectable 0.5 milliGRAM(s) IV Push every 6 hours PRN Severe Pain (7 - 10)    DVT PROPHYLAXIS:   GI PROPHYLAXIS: pantoprazole  Injectable 40 milliGRAM(s) IV Push every 12 hours    ANTICOAGULATION:   ANTIBIOTICS:  ertapenem  IVPB 1000 milliGRAM(s    LAB/STUDIES:  Labs:  CAPILLARY BLOOD GLUCOSE    POCT Blood Glucose.: 147 mg/dL (09 Apr 2023 21:20)                          7.5    7.33  )-----------( 262      ( 09 Apr 2023 23:00 )             22.5       Auto Neutrophil %: 72.3 % (04-09-23 @ 23:00)  Auto Immature Granulocyte %: 4.9 % (04-09-23 @ 23:00)  Auto Neutrophil %: 77.0 % (04-09-23 @ 14:39)  Band Neutrophils %: 1.7 % (04-09-23 @ 14:39)    04-09    138  |  107  |  7<L>  ----------------------------<  139<H>  4.1   |  14<L>  |  0.6<L>      Calcium, Total Serum: 8.4 mg/dL (04-09-23 @ 23:00)      LFTs:             5.1  | 3.4  | 676      ------------------[1050    ( 09 Apr 2023 23:00 )  3.0  | 3.1  | 484         Lipase:x      Amylase:x             Coags:     12.90  ----< 1.13    ( 09 Apr 2023 14:39 )     34.1

## 2023-04-10 NOTE — CONSULT NOTE ADULT - ASSESSMENT
Assessment & Plan    63y Female 1d  s/p POD #7 s/p Whipple, melanotic stool, tachy, febrile, upgrade for possible cholangitis      NEURO:  #Acute pain    -IV tylenol prn    -Inpatient Rx: HYDROmorphone  Injectable 0.5 milliGRAM(s) IV Push every 6 hours PRN      RESP:   #Oxygen insufficiency     -wean off NC to RA as tolerated    -encourage IS     -AM CXR    CARDS:   #tachycardia  -nicom prn  #HTN  Rx-losartan 100 daily (with parameters)  #Imaging/Labs  -f/u EKG      GI/NUTR:   #POD #7 s/p Whipple, RLQ JANICE, prevena in place   -elevated LFTs  #melanotic stool  -GI following, s/p EGD  -monitor Hgb  -PPI bid, carafate   #Diet, NPO  Except Medications  With Ice Chips/Sips of Water (04-09-23 @ 21:30) [Active]    -aspiration precautions, HOB 30  #GI Prophylaxis    -PPI BID  #Bowel regimen- held    -last bowel movement  4/9 (melanotic)    /RENAL:   #urine output in crtically ill    -voiding    Labs:          BUN/Cr- 7/0.6  -->,  9/0.6  -->          Electrolytes-Na 139 // K 4.1 // Mg -- //  Phos -- (04-10 @ 06:10)    #maintain euvolemia        04-09-23 @ 07:01  -  04-10-23 @ 07:00  --------------------------------------------------------  IN: 1170 mL / OUT: 467.5 mL / NET: 702.5 mL    04-10-23 @ 07:01  -  04-10-23 @ 16:52  --------------------------------------------------------  IN: 0 mL / OUT: 30 mL / NET: -30 mL         HEME/ONC:   #DVT prophylaxis    -holding HSQ in setting of melanotic stools, SCDs    Labs: Hb/Hct:  7.5/22.5  -->,  7.2/22.0  -->                      Plts:  262  -->,  288  -->                 PTT/INR:      T&S Expires: 4/12  Blood Consent-obtain if acute anemia, q6 CBC    ID:  #leukocytosis   WBC- 7.29  --->>,  7.33  --->>,  7.91  --->>  Temp trend- 24hrs T(F): 100.8 (04-10 @ 15:38), Max: 100.8 (04-10 @ 15:38)  Current antibiotics-piperacillin/tazobactam IVPB. 3.375 once  piperacillin/tazobactam IVPB.- 3.375 once          ENDO:    -FSG q6 if NPO or Tube feeds    -Glucose goal 140-180    -if above 180 start ISS     HA1C     MSK:     Activity - Ambulate as Tolerated:     Time/Priority:  Routine (04-09-23 @ 18:46)      LINES/DRAINS:  PIV    ADVANCED DIRECTIVES:  Full Code    HCP/Emergency Contact-    INDICATION FOR SICU: Cholangitis             DISPO:   Case discussed with attending Dr. Umanzor Assessment & Plan    63y Female 1d  s/p POD #7 s/p Whipple, melanotic stool, tachy, febrile, upgrade for possible cholangitis      NEURO:  #Acute pain    -IV tylenol prn    -Inpatient Rx: HYDROmorphone  Injectable 0.5 milliGRAM(s) IV Push every 6 hours PRN      RESP:   #Oxygen insufficiency     -wean off NC to RA as tolerated    -encourage IS     -AM CXR    CARDS:   #tachycardia  -nicom prn  #HTN  Rx-losartan 100 daily (held)  #Imaging/Labs  -f/u EKG      GI/NUTR:   #POD #7 s/p Whipple, RLQ JANICE, prevena in place   #Elevated LFTs  -LIVER FUNCTIONS - ( 10 Apr 2023 06:10 )  Alb: 3.0 [3.5 - 5.2] / Pro: 5.4 [6.0 - 8.0] / ALK PHOS: 1108 [30 - 115] / ALT: 604 [0 - 41] / AST: 736 [0 - 41] / GGT: x     LIVER FUNCTIONS - ( 09 Apr 2023 23:00 )  Alb: 3.0 [3.5 - 5.2] / Pro: 5.1 [6.0 - 8.0] / ALK PHOS: 1050 [30 - 115] / ALT: 484 [0 - 41] / AST: 676 [0 - 41] / GGT: x     -Tbili 3.3>3.4>2.4  #melanotic stool  -GI following, s/p EGD, if any more melanotic stool will re-eval the HJ anastomosis   -monitor Hgb  -PPI bid, carafate   #Diet, NPO  Except Medications  With Ice Chips/Sips of Water (04-09-23 @ 21:30) [Active]    -aspiration precautions, HOB 30  #GI Prophylaxis    -PPI BID  #Bowel regimen- held    -last bowel movement  4/9 (melanotic)    /RENAL:   #urine output in crtically ill    -voiding    Labs:          BUN/Cr- 7/0.6  -->,  9/0.6  -->          Electrolytes-Na 139 // K 4.1 // Mg -- //  Phos -- (04-10 @ 06:10)    #maintain euvolemia        04-09-23 @ 07:01  -  04-10-23 @ 07:00  --------------------------------------------------------  IN: 1170 mL / OUT: 467.5 mL / NET: 702.5 mL    04-10-23 @ 07:01  -  04-10-23 @ 16:52  --------------------------------------------------------  IN: 0 mL / OUT: 30 mL / NET: -30 mL         HEME/ONC:   #DVT prophylaxis    -holding HSQ in setting of melanotic stools, SCDs    Labs: Hb/Hct:  7.5/22.5  -->,  7.2/22.0  -->                      Plts:  262  -->,  288  -->                 PTT/INR:      T&S Expires: 4/12  Blood Consent-obtain if acute anemia, q6 CBC    ID:  #leukocytosis   WBC- 7.29  --->>,  7.33  --->>,  7.91  --->>  Temp trend- 24hrs T(F): 100.8 (04-10 @ 15:38), Max: 100.8 (04-10 @ 15:38)  Current antibiotics-piperacillin/tazobactam IVPB. 3.375 once  piperacillin/tazobactam IVPB.- 3.375 once          ENDO:    -FSG q6 if NPO or Tube feeds    -Glucose goal 140-180    -if above 180 start ISS     HA1C     MSK:     Activity - Ambulate as Tolerated:     Time/Priority:  Routine (04-09-23 @ 18:46)      LINES/DRAINS:  PIV    ADVANCED DIRECTIVES:  Full Code    HCP/Emergency Contact-    INDICATION FOR SICU: Cholangitis             DISPO:   Case discussed with attending Dr. Umanzor

## 2023-04-10 NOTE — CONSULT NOTE ADULT - CONSULT REASON
post-op Ertapenem
POD #7 s/p Whipple, melanotic stool, tachy, febrile, upgrade for possible cholangitis
S/P whipple with melena

## 2023-04-10 NOTE — PRE-OP CHECKLIST - NS PREOP CHK HIBICLENS NA
3/8/2022 9:25 PM        Ms. Vidhi Foreman [phone]                :  2000    Please see attached lab results. --Blood counts are normal (white blood cells, hemoglobin/anemia testing, platelets). --Kidney and liver testing normal.  Electrolytes normal.    --Your A1c testing is normal.    The A1c test measures your blood glucose (or blood sugar) and serves as a 3mo average of all your blood glucose values. The above testing doesn't provide a cause for the times you almost passed out. It does not seem to be related to current anemia, kidney or electrolyte abnormalities or low/high blood glucose (blood sugar). If you continue to have episodes, or want to have further evaluation, can have cardiology or neurology evaluate as needed. --Cholesterol testing normal.      Please let me know if you have other questions.     Shari Correia MD N/A

## 2023-04-10 NOTE — CHART NOTE - NSCHARTNOTEFT_GEN_A_CORE
PACU ANESTHESIA ADMISSION NOTE      Procedure: EGD  Post op diagnosis:  GI bleed    ____  Intubated  TV:______       Rate: ______      FiO2: ______    __x__  Patent Airway    __x__  Full return of protective reflexes    __x__  Full recovery from anesthesia / back to baseline status    Vitals:  T(C): 36.3 (04-10-23 @ 12:07), Max: 37.4 (04-09-23 @ 14:08)  HR: 112 (04-10-23 @ 12:07) (94 - 112)  BP: 146/82 (04-10-23 @ 12:07) (104/68 - 146/82)  RR: 18 (04-10-23 @ 12:07) (18 - 20)  SpO2: 95% (04-10-23 @ 12:07) (95% - 100%)    Mental Status:  __x__ Awake   ___x__ Alert   _____ Drowsy   _____ Sedated    Nausea/Vomiting:  __x__ NO  ______Yes,   See Post - Op Orders          Pain Scale (0-10):  _____    Treatment: ____ None    __x__ See Post - Op/PCA Orders    Post - Operative Fluids:   ____ Oral   __x__ See Post - Op Orders    Plan: Discharge:   ____Home       __x__Floor     _____Critical Care    _____  Other:_________________    Comments: Patient had smooth intraoperative event, no anesthesia complication.  PACU Vital signs: HR:    113        BP:        124/76          RR:    20         O2 Sat:       95%     Temp 97.9

## 2023-04-10 NOTE — CONSULT NOTE ADULT - ASSESSMENT
ASSESSMENT  63y F s/p pancreaticoduodenectomy on 4/3/23 presents for lightheadedness, melanotic stools, nausea and increasing abdominal pain. CTAP showing expected post-operative changes. Afebrile. As per daughter, patient is not taking Ertapenem (or any other ABx) at home.      IMPRESSION  UGIB  Abdominal pain  s/p Pancreaticoduodenectomy for pancreatic mass  Sepsis rule out on admission    RECOMMENDATIONS  - afebrile, VSS, no WBC count; no indication for Ertapenem at this time, pt was never on ertapenem at home, unclear where this history came from   - monitor off ABx  - if concern for cholangitis, can start Zosyn

## 2023-04-10 NOTE — CONSULT NOTE ADULT - SUBJECTIVE AND OBJECTIVE BOX
JEREMIAS IVEY     63y Female    MRN-[id number]    Admit Date: 04-09-23  Hospital LOS: 1d  ICU Admission Date:  OR #1-        ============================  HPI   63y F  POD #7 s/p Pancreaticoduodenectomy on 4/3/2023 and d/c on 4/8, presented for lightheadedness and recent episodes of melanotic stools (last 4/9 in afternoon). Denies fevers/chills, chest pain, shortness of breath, syncope. She states that last night she had an episode of nausea and retching without vomiting and increasing abdominal pain.  In ED, Hgb 8.5>7.5>7.2, LFTs elevated Tbili 3.3>3.4>2.4, Alk phos 1146, now 1108. CT was unremarkable, GI c/s, EGD  done to evaluate the GJ anastomosis no active bleeding noted, old blood noted, negative for obstruction. Pt was tachycardic on the floor 110s, tmax 100.8, bolused 500cc LR, started IVF, zosyn.          24 Hour Events  -Admission under SICU service      [X] A ten-point review of systems was otherwise negative except as noted above.  [  ] Due to altered mental status/intubation, subjective information was not attained from the patient. History was obtained, to the extent possible, from review of the chart and collateral sources of information.    =========================================================================================================================================      PMH  PAST MEDICAL & SURGICAL HISTORY:  HTN (hypertension)  High cholesterol  Diabetes  COPD (chronic obstructive pulmonary disease)  Kidney stones  No significant past surgical history        Home Meds:  Home Medications:  acetaminophen 325 mg oral tablet: 2 tab(s) orally every 6 hours as needed for  pain (08 Apr 2023 10:59)  Benicar: orally once a day (03 Apr 2023 06:33)  Lipitor: orally once a day (03 Apr 2023 06:33)  metFORMIN:  (03 Apr 2023 06:33)     Allergies  Allergies    No Known Allergies    Intolerances       Current Medications:  dextrose Oral Gel 15 Gram(s) Oral once PRN Blood Glucose LESS THAN 70 milliGRAM(s)/deciliter  HYDROmorphone  Injectable 0.5 milliGRAM(s) IV Push every 6 hours PRN Severe Pain (7 - 10)  insulin lispro (ADMELOG) corrective regimen sliding scale   SubCutaneous every 6 hours  lactated ringers. 1000 milliLiter(s) (110 mL/Hr) IV Continuous <Continuous>  losartan 100 milliGRAM(s) Oral daily  pantoprazole  Injectable 40 milliGRAM(s) IV Push every 12 hours  piperacillin/tazobactam IVPB. 3.375 Gram(s) IV Intermittent once  piperacillin/tazobactam IVPB.- 3.375 Gram(s) IV Intermittent once  sucralfate suspension 1 Gram(s) Oral every 6 hours      VITAL SIGNS, INS/OUTS (Last 24Hours)  ICU Vital Signs Last 24 Hrs  T(C): 38.2 (10 Apr 2023 15:38), Max: 38.2 (10 Apr 2023 15:38)  T(F): 100.8 (10 Apr 2023 15:38), Max: 100.8 (10 Apr 2023 15:38)  HR: 106 (10 Apr 2023 14:39) (94 - 112)  BP: 136/77 (10 Apr 2023 14:39) (104/68 - 146/82)  BP(mean): --  ABP: --  ABP(mean): --  RR: 17 (10 Apr 2023 13:27) (17 - 18)  SpO2: 94% (10 Apr 2023 13:27) (94% - 95%)    O2 Parameters below as of 10 Apr 2023 13:27  Patient On (Oxygen Delivery Method): room air          I&O's Summary    09 Apr 2023 07:01  -  10 Apr 2023 07:00  --------------------------------------------------------  IN: 1170 mL / OUT: 467.5 mL / NET: 702.5 mL    10 Apr 2023 07:01  -  10 Apr 2023 16:38  --------------------------------------------------------  IN: 0 mL / OUT: 30 mL / NET: -30 mL            Physical Exam:   ----------------------------------------------------------------------------------------------------------  GCS:      Exam: A&Ox3, no focal deficits    RESPIRATORY:  Normal expansion/effort    CARDIOVASCULAR:   S1/S2.  RRR  No peripheral edema    GASTROINTESTINAL:  Abdomen soft, tender, distended, RLQ JANICE w/ minimal serous output, horizontal prevena in place     MUSCULOSKELETAL:  Extremities warm, pink, well-perfused.    DERM:  No skin breakdown     :   Exam: voiding    Height (cm): 160 (04-10-23)  Weight (kg): 68.9 (04-10-23)  BMI (kg/m2): 26.9 (04-10-23)  BSA (m2): 1.72 (04-10-23)    Tubes/Lines/Drains   ----------------------------------------------------------------------------------------------------------  [x] Peripheral IV

## 2023-04-10 NOTE — CONSULT NOTE ADULT - SUBJECTIVE AND OBJECTIVE BOX
JEREMIAS IVEY  63y, Female  Allergy: No Known Allergies      CHIEF COMPLAINT: S/P whipple with melena (10 Apr 2023 09:33)      HPI:  63y F presents s/p Pancreaticoduodenectomy on 4/3/2023 for lightheadedness and recent episodes of melanotic stools. Denies fevers/chills, chest pain, shortness of breath, syncope. She states that last night she had an episode of nausea and retching without vomiting and increasing abdominal pain.   (09 Apr 2023 18:19)    FAMILY HISTORY:    PAST MEDICAL & SURGICAL HISTORY:  HTN (hypertension)      High cholesterol      Diabetes      COPD (chronic obstructive pulmonary disease)      Kidney stones      No significant past surgical history          SOCIAL HISTORY    Substance Use (  ) never used  (  ) IVDU (  ) Other:  Tobacco Usage:  (   ) never smoked   (   ) former smoker   (   ) current smoker   Alcohol Usage: (   ) social  (   ) daily use (   ) denies  Sexual History:       ROS  General: Denies rigors, nightsweats  HEENT: Denies headache, rhinorrhea, sore throat, eye pain  CV: Denies CP, palpitations  PULM: Denies wheezing, hemoptysis  GI: Denies hematemesis, hematochezia, melena  : Denies discharge, hematuria  MSK: Denies arthralgias, myalgias  SKIN: Denies rash, lesions  NEURO: Denies paresthesias, weakness  PSYCH: Denies depression, anxiety    VITALS:  T(F): 97.3, Max: 99.4 (04-09-23 @ 14:08)  HR: 112  BP: 146/82  RR: 18Vital Signs Last 24 Hrs  T(C): 36.3 (10 Apr 2023 12:07), Max: 37.4 (09 Apr 2023 14:08)  T(F): 97.3 (10 Apr 2023 11:06), Max: 99.4 (09 Apr 2023 14:08)  HR: 112 (10 Apr 2023 12:07) (94 - 112)  BP: 146/82 (10 Apr 2023 12:07) (104/68 - 146/82)  BP(mean): --  RR: 18 (10 Apr 2023 12:07) (18 - 20)  SpO2: 95% (10 Apr 2023 12:07) (95% - 100%)    Parameters below as of 10 Apr 2023 00:54  Patient On (Oxygen Delivery Method): room air        PHYSICAL EXAM:  Gen: NAD, resting in bed  HEENT: Normocephalic, atraumatic  Neck: supple, no lymphadenopathy  CV: Regular rate & regular rhythm  Lungs: decreased BS at bases  Abdomen: Soft, BS present, mildly TTP in epigastric region   Ext: Warm, well perfused  Neuro: non focal, awake  Skin: no rash, no erythema    TESTS & MEASUREMENTS:                        7.2    7.91  )-----------( 288      ( 10 Apr 2023 06:10 )             22.0     04-10    139  |  108  |  9<L>  ----------------------------<  148<H>  4.1   |  18  |  0.6<L>    Ca    8.8      10 Apr 2023 06:10  Phos  3.4     04-09  Mg     1.8     04-09    TPro  5.4<L>  /  Alb  3.0<L>  /  TBili  2.4<H>  /  DBili  2.1<H>  /  AST  736<H>  /  ALT  604<H>  /  AlkPhos  1108<H>  04-10      LIVER FUNCTIONS - ( 10 Apr 2023 06:10 )  Alb: 3.0 g/dL / Pro: 5.4 g/dL / ALK PHOS: 1108 U/L / ALT: 604 U/L / AST: 736 U/L / GGT: x                     INFECTIOUS DISEASES TESTING      RADIOLOGY & ADDITIONAL TESTS:  I have personally reviewed the last Chest xray  CXR      CT  CT Abdomen and Pelvis w/ Oral Cont and w/ IV Cont:   ACC: [id number] EXAM:  CT ABDOMEN AND PELVIS OC IC   ORDERED BY: TAYLER CORNELL     PROCEDURE DATE:  04/09/2023          INTERPRETATION:  CLINICAL STATEMENT: Gastrointestinal bleed. Pancreatic   mass. Pancreatic head mass status post pylorus-sparing   pancreaticoduodenectomy on 4/3/2023    TECHNIQUE: Contiguous axial CT images were obtained from the lower chest   to the pubic symphysis following administration of 100cc Omnipaque 350   intravenous contrast.  Oral contrast was administered.  Reformatted   images in the coronal and sagittal planes were acquired.    COMPARISON: CT abdomen and pelvis 2/16/2023.      FINDINGS:    LOWER CHEST: Trace left pleural effusion. Bilateral lower lobe   subsegmental atelectasis.    HEPATOBILIARY/PANCREAS: Post pylorus-sparing pancreaticoduodenectomy with   placement of a choledochojejunostomy stent and pancreaticojejunostomy   stent. Right upper quadrant JANICE drain. Moderate amount of fat stranding   and small amount of free fluid in the right upper quadrant, likely   postsurgical.    Hepatic steatosis. No significant dilation of the intrahepatic bile   ducts. Unchanged subcentimeter hypodensity in the right hepatic dome too   small to characterize.    SPLEEN: Unremarkable.    ADRENAL GLANDS: Unremarkable.    KIDNEYS: Symmetric renal enhancement. No hydronephrosis.    ABDOMINOPELVIC NODES: Unremarkable.    PELVIC ORGANS: Foci of air in the bladder, likely from prior   instrumentation.    PERITONEUM/MESENTERY/BOWEL: Oral contrast is seen up tothe ileum. No   evidence of bowel obstruction or pneumoperitoneum. Small amount of pelvic   free fluid. Normal caliber appendix.    BONES/SOFT TISSUES: Soft tissue edema of the right flank abdominal wall   muscular/fascial layer. No evidence of acutedrainable collection.   Degenerative changes of the spine.    OTHER: Mild scattered atherosclerotic plaque.      IMPRESSION:    No evidence of acute abdominal pathology.    Post pancreaticoduodenectomy with expected recent postoperative change.    --- End of Report ---          QUIN FRITZ MD; Resident Radiologist  This document has been electronically signed.  LEXX LAINEZ MD; Attending Radiologist  This document has been electronically signed. Apr 9 2023 11:58PM (04-09-23 @ 16:43)      CARDIOLOGY TESTING  12 Lead ECG:   Ventricular Rate 98 BPM    Atrial Rate 98 BPM    P-R Interval 144 ms    QRS Duration 78 ms    Q-T Interval 348 ms    QTC Calculation(Bazett) 444 ms    P Axis 37 degrees    R Axis 0 degrees    T Axis 1 degrees    Diagnosis Line Normal sinus rhythm  Inferior infarct , age undetermined  Abnormal ECG    Confirmed by Elisha Moses MD [id number]) on 4/10/2023 8:29:51 AM (04-09-23 @ 17:46)  12 Lead ECG:   Ventricular Rate 87 BPM    Atrial Rate 87 BPM    P-R Interval 164 ms    QRS Duration 80 ms    Q-T Interval 372 ms    QTC Calculation(Bazett) 447 ms    P Axis 46 degrees    R Axis -4 degrees    T Axis 3 degrees    Diagnosis Line Normal sinus rhythm  Inferior infarct , age undetermined  Nonspecific T wave abnormality  Abnormal ECG    Confirmed by Elisha Moses MD [id number]) on 4/4/2023 9:20:20 AM (04-03-23 @ 21:27)      MEDICATIONS  ertapenem  IVPB 1000  insulin lispro (ADMELOG) corrective regimen sliding scale   lactated ringers. 1000  losartan 100  pantoprazole  Injectable 40  sucralfate suspension 1      ANTIBIOTICS:  ertapenem  IVPB 1000 milliGRAM(s) IV Intermittent every 24 hours        ASSESSMENT  63y F s/p pancreaticoduodenectomy on 4/3/23 presents for lightheadedness, melanotic stools, nausea and increasing abdominal pain. CTAP showing expected post-operative changes. Afebrile. As per daughter, patient is not taking Ertapenem (or any other ABx) at home.      HTN (hypertension)    High cholesterol    Diabetes    COPD (chronic obstructive pulmonary disease)    Kidney stones        PROBLEMS  UGIB  Abdominal pain  s/p Pancreaticoduodenectomy    RECOMMENDATIONS  - afebrile, VSS, no WBC count; no indication for Ertapenem at this time  - monitor off ABx  - if concern for cholangitis, can start Zosyn      JEREMIAS IVEY  63y, Female  Allergy: No Known Allergies      CHIEF COMPLAINT: S/P whipple with melena (10 Apr 2023 09:33)      HPI:  63y F presents s/p Pancreaticoduodenectomy on 4/3/2023 for lightheadedness and recent episodes of melanotic stools. Denies fevers/chills, chest pain, shortness of breath, syncope. She states that last night she had an episode of nausea and retching without vomiting and increasing abdominal pain.   (09 Apr 2023 18:19)  ID consulted for request to continue home ertapenem, pt was never on ertapenem  Afebrile  No leukocytosis     FAMILY HISTORY: non-contributory     PAST MEDICAL & SURGICAL HISTORY:  HTN (hypertension)      High cholesterol      Diabetes      COPD (chronic obstructive pulmonary disease)      Kidney stones      No significant past surgical history          SOCIAL HISTORY    Substance Use (  ) never used  (  ) IVDU (  ) Other:  Tobacco Usage:  (   ) never smoked   (   ) former smoker   (   ) current smoker   Alcohol Usage: (   ) social  (   ) daily use (   ) denies  Sexual History:       ROS  General: Denies rigors, nightsweats  HEENT: Denies headache, rhinorrhea, sore throat, eye pain  CV: Denies CP, palpitations  PULM: Denies wheezing, hemoptysis  GI: Denies hematemesis, hematochezia, melena  : Denies discharge, hematuria  MSK: Denies arthralgias, myalgias  SKIN: Denies rash, lesions  NEURO: Denies paresthesias, weakness  PSYCH: Denies depression, anxiety    VITALS:  T(F): 97.3, Max: 99.4 (04-09-23 @ 14:08)  HR: 112  BP: 146/82  RR: 18Vital Signs Last 24 Hrs  T(C): 36.3 (10 Apr 2023 12:07), Max: 37.4 (09 Apr 2023 14:08)  T(F): 97.3 (10 Apr 2023 11:06), Max: 99.4 (09 Apr 2023 14:08)  HR: 112 (10 Apr 2023 12:07) (94 - 112)  BP: 146/82 (10 Apr 2023 12:07) (104/68 - 146/82)  BP(mean): --  RR: 18 (10 Apr 2023 12:07) (18 - 20)  SpO2: 95% (10 Apr 2023 12:07) (95% - 100%)    Parameters below as of 10 Apr 2023 00:54  Patient On (Oxygen Delivery Method): room air        PHYSICAL EXAM:  Gen: NAD, resting in bed  HEENT: Normocephalic, atraumatic  Neck: supple, no lymphadenopathy  CV: Regular rate & regular rhythm  Lungs: decreased BS at bases  Abdomen: Soft, BS present, mildly TTP in epigastric region   Ext: Warm, well perfused  Neuro: non focal, awake  Skin: no rash, no erythema    TESTS & MEASUREMENTS:                        7.2    7.91  )-----------( 288      ( 10 Apr 2023 06:10 )             22.0     04-10    139  |  108  |  9<L>  ----------------------------<  148<H>  4.1   |  18  |  0.6<L>    Ca    8.8      10 Apr 2023 06:10  Phos  3.4     04-09  Mg     1.8     04-09    TPro  5.4<L>  /  Alb  3.0<L>  /  TBili  2.4<H>  /  DBili  2.1<H>  /  AST  736<H>  /  ALT  604<H>  /  AlkPhos  1108<H>  04-10      LIVER FUNCTIONS - ( 10 Apr 2023 06:10 )  Alb: 3.0 g/dL / Pro: 5.4 g/dL / ALK PHOS: 1108 U/L / ALT: 604 U/L / AST: 736 U/L / GGT: x                     INFECTIOUS DISEASES TESTING      RADIOLOGY & ADDITIONAL TESTS:  I have personally reviewed the last Chest xray  CXR      CT  CT Abdomen and Pelvis w/ Oral Cont and w/ IV Cont:   ACC: [id number] EXAM:  CT ABDOMEN AND PELVIS OC IC   ORDERED BY: TAYLER CORNELL     PROCEDURE DATE:  04/09/2023          INTERPRETATION:  CLINICAL STATEMENT: Gastrointestinal bleed. Pancreatic   mass. Pancreatic head mass status post pylorus-sparing   pancreaticoduodenectomy on 4/3/2023    TECHNIQUE: Contiguous axial CT images were obtained from the lower chest   to the pubic symphysis following administration of 100cc Omnipaque 350   intravenous contrast.  Oral contrast was administered.  Reformatted   images in the coronal and sagittal planes were acquired.    COMPARISON: CT abdomen and pelvis 2/16/2023.      FINDINGS:    LOWER CHEST: Trace left pleural effusion. Bilateral lower lobe   subsegmental atelectasis.    HEPATOBILIARY/PANCREAS: Post pylorus-sparing pancreaticoduodenectomy with   placement of a choledochojejunostomy stent and pancreaticojejunostomy   stent. Right upper quadrant JANICE drain. Moderate amount of fat stranding   and small amount of free fluid in the right upper quadrant, likely   postsurgical.    Hepatic steatosis. No significant dilation of the intrahepatic bile   ducts. Unchanged subcentimeter hypodensity in the right hepatic dome too   small to characterize.    SPLEEN: Unremarkable.    ADRENAL GLANDS: Unremarkable.    KIDNEYS: Symmetric renal enhancement. No hydronephrosis.    ABDOMINOPELVIC NODES: Unremarkable.    PELVIC ORGANS: Foci of air in the bladder, likely from prior   instrumentation.    PERITONEUM/MESENTERY/BOWEL: Oral contrast is seen up tothe ileum. No   evidence of bowel obstruction or pneumoperitoneum. Small amount of pelvic   free fluid. Normal caliber appendix.    BONES/SOFT TISSUES: Soft tissue edema of the right flank abdominal wall   muscular/fascial layer. No evidence of acutedrainable collection.   Degenerative changes of the spine.    OTHER: Mild scattered atherosclerotic plaque.      IMPRESSION:    No evidence of acute abdominal pathology.    Post pancreaticoduodenectomy with expected recent postoperative change.    --- End of Report ---          QUIN FRITZ MD; Resident Radiologist  This document has been electronically signed.  LEXX LAINEZ MD; Attending Radiologist  This document has been electronically signed. Apr 9 2023 11:58PM (04-09-23 @ 16:43)      CARDIOLOGY TESTING  12 Lead ECG:   Ventricular Rate 98 BPM    Atrial Rate 98 BPM    P-R Interval 144 ms    QRS Duration 78 ms    Q-T Interval 348 ms    QTC Calculation(Bazett) 444 ms    P Axis 37 degrees    R Axis 0 degrees    T Axis 1 degrees    Diagnosis Line Normal sinus rhythm  Inferior infarct , age undetermined  Abnormal ECG    Confirmed by Elisha Moses MD ([id number]) on 4/10/2023 8:29:51 AM (04-09-23 @ 17:46)  12 Lead ECG:   Ventricular Rate 87 BPM    Atrial Rate 87 BPM    P-R Interval 164 ms    QRS Duration 80 ms    Q-T Interval 372 ms    QTC Calculation(Bazett) 447 ms    P Axis 46 degrees    R Axis -4 degrees    T Axis 3 degrees    Diagnosis Line Normal sinus rhythm  Inferior infarct , age undetermined  Nonspecific T wave abnormality  Abnormal ECG    Confirmed by Elisha Moses MD ([id number]) on 4/4/2023 9:20:20 AM (04-03-23 @ 21:27)      MEDICATIONS  ertapenem  IVPB 1000  insulin lispro (ADMELOG) corrective regimen sliding scale   lactated ringers. 1000  losartan 100  pantoprazole  Injectable 40  sucralfate suspension 1      ANTIBIOTICS:  ertapenem  IVPB 1000 milliGRAM(s) IV Intermittent every 24 hours

## 2023-04-10 NOTE — CONSULT NOTE ADULT - SUBJECTIVE AND OBJECTIVE BOX
Gastroenterology Consultation:    Patient is a 63y old  Female who presents with a chief complaint of S/P whipple with melena (10 Apr 2023 01:24)        Admitted on: 04-09-23      HPI:  63y F presents s/p Pancreaticoduodenectomy on 4/3/2023 for lightheadedness and recent episodes of melanotic stools. Denies fevers/chills, chest pain, shortness of breath, syncope. She states that last night she had an episode of nausea and retching without vomiting and increasing abdominal pain.   (09 Apr 2023 18:19)        Prior EGD:    Prior Colonoscopy:      PAST MEDICAL & SURGICAL HISTORY:  HTN (hypertension)      High cholesterol      Diabetes      COPD (chronic obstructive pulmonary disease)      Kidney stones      No significant past surgical history            FAMILY HISTORY:      Social History:  Tobacco:  Alcohol:  Drugs:    Home Medications:  acetaminophen 325 mg oral tablet: 2 tab(s) orally every 6 hours as needed for  pain (08 Apr 2023 10:59)  Benicar: orally once a day (03 Apr 2023 06:33)  Lipitor: orally once a day (03 Apr 2023 06:33)  metFORMIN:  (03 Apr 2023 06:33)        MEDICATIONS  (STANDING):  ertapenem  IVPB 1000 milliGRAM(s) IV Intermittent every 24 hours  insulin lispro (ADMELOG) corrective regimen sliding scale   SubCutaneous every 6 hours  lactated ringers. 1000 milliLiter(s) (110 mL/Hr) IV Continuous <Continuous>  losartan 100 milliGRAM(s) Oral daily  pantoprazole  Injectable 40 milliGRAM(s) IV Push every 12 hours  sucralfate suspension 1 Gram(s) Oral every 6 hours    MEDICATIONS  (PRN):  dextrose Oral Gel 15 Gram(s) Oral once PRN Blood Glucose LESS THAN 70 milliGRAM(s)/deciliter  HYDROmorphone  Injectable 0.5 milliGRAM(s) IV Push every 6 hours PRN Severe Pain (7 - 10)      Allergies  No Known Allergies      Review of Systems:   Constitutional:  No Fever, No Chills  ENT/Mouth:  No Hearing Changes,  No Difficulty Swallowing  Eyes:  No Eye Pain, No Vision Changes  Cardiovascular:  No Chest Pain, No Palpitations  Respiratory:  No Cough, No Dyspnea  Gastrointestinal:  As described in HPI  Musculoskeletal:  No Joint Swelling, No Back Pain  Skin:  No Skin Lesions, No Jaundice  Neuro:  No Syncope, No Dizziness  Heme/Lymph:  No Bruising, No Bleeding.          Physical Examination:  T(C): 36.4 (04-10-23 @ 08:37), Max: 37.4 (04-09-23 @ 14:08)  HR: 111 (04-10-23 @ 08:37) (94 - 111)  BP: 128/76 (04-10-23 @ 08:37) (104/68 - 137/72)  RR: 18 (04-10-23 @ 08:37) (18 - 20)  SpO2: 95% (04-10-23 @ 08:37) (95% - 100%)  Height (cm): 160 (04-09-23 @ 20:20)  Weight (kg): 74 (04-09-23 @ 20:20)    04-09-23 @ 07:01  -  04-10-23 @ 07:00  --------------------------------------------------------  IN: 1170 mL / OUT: 467.5 mL / NET: 702.5 mL          GENERAL: AAOx3, no acute distress.  HEAD:  Atraumatic, Normocephalic  EYES: conjunctiva and sclera clear  NECK: Supple, no JVD or thyromegaly  CHEST/LUNG: Clear to auscultation bilaterally; No wheeze, rhonchi, or rales  HEART: Regular rate and rhythm; normal S1, S2, No murmurs.  ABDOMEN: Soft, nontender, nondistended; Bowel sounds present  NEUROLOGY: No asterixis or tremor.   SKIN: Intact, no jaundice        Data:                        7.2    7.91  )-----------( 288      ( 10 Apr 2023 06:10 )             22.0     Hgb Trend:  7.2  04-10-23 @ 06:10  7.5  04-09-23 @ 23:00  8.6  04-09-23 @ 14:39  8.5  04-07-23 @ 21:09        04-10    139  |  108  |  9<L>  ----------------------------<  148<H>  4.1   |  18  |  0.6<L>    Ca    8.8      10 Apr 2023 06:10  Phos  3.4     04-09  Mg     1.8     04-09    TPro  5.4<L>  /  Alb  3.0<L>  /  TBili  2.4<H>  /  DBili  2.1<H>  /  AST  736<H>  /  ALT  604<H>  /  AlkPhos  1108<H>  04-10    Liver panel trend:  TBili 2.4   /      /      /   AlkP 1108   /   Tptn 5.4   /   Alb 3.0    /   DBili 2.1      04-10  TBili 3.4   /      /      /   AlkP 1050   /   Tptn 5.1   /   Alb 3.0    /   DBili 3.1      04-09  TBili 3.3   /      /      /   AlkP 1146   /   Tptn 5.8   /   Alb 3.3    /   DBili 3.0      04-09  TBili 0.8   /   AST 93   /   ALT 63   /   AlkP 444   /   Tptn 5.5   /   Alb 3.2    /   DBili 0.2      04-07  TBili 0.7   /   AST 25   /   ALT 23   /   AlkP 201   /   Tptn 5.1   /   Alb 3.0    /   DBili 0.2      04-06  TBili 1.0   /   AST 28   /   ALT 27   /   AlkP 90   /   Tptn 5.0   /   Alb 3.1    /   DBili 0.2      04-06  TBili 1.2   /   AST 38   /   ALT 35   /   AlkP 49   /   Tptn 5.0   /   Alb 3.2    /   DBili 0.4      04-05  TBili 1.1   /   AST 54   /   ALT 63   /   AlkP 62   /   Tptn 6.4   /   Alb 4.2    /   DBili 0.3      04-03      PT/INR - ( 09 Apr 2023 14:39 )   PT: 12.90 sec;   INR: 1.13 ratio         PTT - ( 09 Apr 2023 14:39 )  PTT:34.1 sec        Radiology:  CT Abdomen and Pelvis w/ Oral Cont and w/ IV Cont:   ACC: [id number] EXAM:  CT ABDOMEN AND PELVIS OC IC   ORDERED BY: TAYLER CORNELL     PROCEDURE DATE:  04/09/2023          INTERPRETATION:  CLINICAL STATEMENT: Gastrointestinal bleed. Pancreatic   mass. Pancreatic head mass status post pylorus-sparing   pancreaticoduodenectomy on 4/3/2023    TECHNIQUE: Contiguous axial CT images were obtained from the lower chest   to the pubic symphysis following administration of 100cc Omnipaque 350   intravenous contrast.  Oral contrast was administered.  Reformatted   images in the coronal and sagittal planes were acquired.    COMPARISON: CT abdomen and pelvis 2/16/2023.      FINDINGS:    LOWER CHEST: Trace left pleural effusion. Bilateral lower lobe   subsegmental atelectasis.    HEPATOBILIARY/PANCREAS: Post pylorus-sparing pancreaticoduodenectomy with   placement of a choledochojejunostomy stent and pancreaticojejunostomy   stent. Right upper quadrant JANICE drain. Moderate amount of fat stranding   and small amount of free fluid in the right upper quadrant, likely   postsurgical.    Hepatic steatosis. No significant dilation of the intrahepatic bile   ducts. Unchanged subcentimeter hypodensity in the right hepatic dome too   small to characterize.    SPLEEN: Unremarkable.    ADRENAL GLANDS: Unremarkable.    KIDNEYS: Symmetric renal enhancement. No hydronephrosis.    ABDOMINOPELVIC NODES: Unremarkable.    PELVIC ORGANS: Foci of air in the bladder, likely from prior   instrumentation.    PERITONEUM/MESENTERY/BOWEL: Oral contrast is seen up tothe ileum. No   evidence of bowel obstruction or pneumoperitoneum. Small amount of pelvic   free fluid. Normal caliber appendix.    BONES/SOFT TISSUES: Soft tissue edema of the right flank abdominal wall   muscular/fascial layer. No evidence of acutedrainable collection.   Degenerative changes of the spine.    OTHER: Mild scattered atherosclerotic plaque.      IMPRESSION:    No evidence of acute abdominal pathology.    Post pancreaticoduodenectomy with expected recent postoperative change.    --- End of Report ---          QUIN FRITZ MD; Resident Radiologist  This document has been electronically signed.  LEXX LAINEZ MD; Attending Radiologist  This document has been electronically signed. 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 patient was discharged on  and presented back on . endorses weakness and 2 episodes of black stool. admitted to surgical service.  reported tarry stool 4pm yesterday (last bm). Denies fevers/chills, chest pain, shortness of breath, syncope. endorses 5/10 diffuse abdominal pain with no specific exacerbating or alleviating factors. GI is called for evaluation.       EUS (Upper) Procedure Report    Date: 2022 3:15 PM    Patient Name: JEREMIAS IVEY    MRN: [id number]    Account Number:    [id number]    Gender: Female     (age): 1960 (62)    Attending/Fellow:    Edmund Charlton MD      EGD: nonerosive gastritis (biopsy).      EUS: slightly hypoechoic, homogeneous, regularly shaped, and poorly  circumscribed, subtle lesion measuring around 10 x 8.2 mm at the level of the  genu, abutting the PD, resulting in an extrinsic PD stricture and PD dilation  upstream in the body and tail of pancreas. The PD measured 4 mm in the genu  around the level of the stricture and had a solid intra-ductal hyperechoic  nodule measuring 6.4 x 6.4 mm. The pancreatic lesion was sampled using a 22 g  FNB and adequate tissue was obtained with JOSÉ LUIS and KELLIE. In the body and tail of  pancreas, the parenchyma was slightly atrophied and the PD was dilated to 3-4  mm. Otherwise unremarkable exam of the pancreatic head and uncinate process.    EUS: 7 x 6.6 mm portal LN that appeared hypoechoic, homogeneous, irregularly  shaped and well circumscribed that was too small to sample.      PAST MEDICAL & SURGICAL HISTORY:  HTN (hypertension)      High cholesterol      Diabetes      COPD (chronic obstructive pulmonary disease)      Kidney stones      No significant past surgical history            FAMILY HISTORY:  -ve for GI malignancy     Social History:  Tobacco: denies  Alcohol: denies  Drugs: denies    Home Medications:  acetaminophen 325 mg oral tablet: 2 tab(s) orally every 6 hours as needed for  pain (2023 10:59)  Benicar: orally once a day (2023 06:33)  Lipitor: orally once a day (2023 06:33)  metFORMIN:  (2023 06:33)        MEDICATIONS  (STANDING):  ertapenem  IVPB 1000 milliGRAM(s) IV Intermittent every 24 hours  insulin lispro (ADMELOG) corrective regimen sliding scale   SubCutaneous every 6 hours  lactated ringers. 1000 milliLiter(s) (110 mL/Hr) IV Continuous <Continuous>  losartan 100 milliGRAM(s) Oral daily  pantoprazole  Injectable 40 milliGRAM(s) IV Push every 12 hours  sucralfate suspension 1 Gram(s) Oral every 6 hours    MEDICATIONS  (PRN):  dextrose Oral Gel 15 Gram(s) Oral once PRN Blood Glucose LESS THAN 70 milliGRAM(s)/deciliter  HYDROmorphone  Injectable 0.5 milliGRAM(s) IV Push every 6 hours PRN Severe Pain (7 - 10)      Allergies  No Known Allergies      Review of Systems:   Constitutional:  No Fever, No Chills  ENT/Mouth:  No Hearing Changes,  No Difficulty Swallowing  Eyes:  No Eye Pain, No Vision Changes  Cardiovascular:  No Chest Pain, No Palpitations  Respiratory:  No Cough, No Dyspnea  Gastrointestinal:  As described in HPI  Musculoskeletal:  No Joint Swelling, No Back Pain  Skin:  No Skin Lesions, No Jaundice  Neuro:  No Syncope, No Dizziness  Heme/Lymph:  No Bruising, No Bleeding.          Physical Examination:  T(C): 36.4 (04-10-23 @ 08:37), Max: 37.4 (23 @ 14:08)  HR: 111 (04-10-23 @ 08:37) (94 - 111)  BP: 128/76 (04-10-23 @ 08:37) (104/68 - 137/72)  RR: 18 (04-10-23 @ 08:37) (18 - 20)  SpO2: 95% (04-10-23 @ 08:37) (95% - 100%)  Height (cm): 160 (23 @ 20:20)  Weight (kg): 74 (23 @ 20:20)    23 @ 07:01  -  04-10-23 @ 07:00  --------------------------------------------------------  IN: 1170 mL / OUT: 467.5 mL / NET: 702.5 mL          GENERAL: AAOx3, no acute distress.  HEAD:  Atraumatic, Normocephalic  EYES: conjunctiva and sclera clear  NECK: Supple, no JVD or thyromegaly  CHEST/LUNG: Clear to auscultation bilaterally; No wheeze, rhonchi, or rales  HEART: Regular rate and rhythm; normal S1, S2, No murmurs.  ABDOMEN: Soft, nontender, nondistended; Bowel sounds present  NEUROLOGY: No asterixis or tremor.   SKIN: Intact, no jaundice        Data:                        7.2    7.91  )-----------( 288      ( 10 Apr 2023 06:10 )             22.0     Hgb Trend:  7.2  04-10-23 @ 06:10  7.5  23 @ 23:00  8.6  23 @ 14:39  8.5  23 @ 21:09        04-10    139  |  108  |  9<L>  ----------------------------<  148<H>  4.1   |  18  |  0.6<L>    Ca    8.8      10 Apr 2023 06:10  Phos  3.4       Mg     1.8         TPro  5.4<L>  /  Alb  3.0<L>  /  TBili  2.4<H>  /  DBili  2.1<H>  /  AST  736<H>  /  ALT  604<H>  /  AlkPhos  1108<H>  04-10    Liver panel trend:  TBili 2.4   /      /      /   AlkP 1108   /   Tptn 5.4   /   Alb 3.0    /   DBili 2.1      04-10  TBili 3.4   /      /      /   AlkP 1050   /   Tptn 5.1   /   Alb 3.0    /   DBili 3.1        TBili 3.3   /      /      /   AlkP 1146   /   Tptn 5.8   /   Alb 3.3    /   DBili 3.0      04-09  TBili 0.8   /   AST 93   /   ALT 63   /   AlkP 444   /   Tptn 5.5   /   Alb 3.2    /   DBili 0.2      04-07  TBili 0.7   /   AST 25   /   ALT 23   /   AlkP 201   /   Tptn 5.1   /   Alb 3.0    /   DBili 0.2      04-06  TBili 1.0   /   AST 28   /   ALT 27   /   AlkP 90   /   Tptn 5.0   /   Alb 3.1    /   DBili 0.2      04-06  TBili 1.2   /   AST 38   /   ALT 35   /   AlkP 49   /   Tptn 5.0   /   Alb 3.2    /   DBili 0.4      04-05  TBili 1.1   /   AST 54   /   ALT 63   /   AlkP 62   /   Tptn 6.4   /   Alb 4.2    /   DBili 0.3      04-03      PT/INR - ( 2023 14:39 )   PT: 12.90 sec;   INR: 1.13 ratio         PTT - ( 2023 14:39 )  PTT:34.1 sec        Radiology:  CT Abdomen and Pelvis w/ Oral Cont and w/ IV Cont:   ACC: [id number] EXAM:  CT ABDOMEN AND PELVIS OC IC   ORDERED BY: TAYLER CORNELL     PROCEDURE DATE:  2023          INTERPRETATION:  CLINICAL STATEMENT: Gastrointestinal bleed. Pancreatic   mass. Pancreatic head mass status post pylorus-sparing   pancreaticoduodenectomy on 4/3/2023    TECHNIQUE: Contiguous axial CT images were obtained from the lower chest   to the pubic symphysis following administration of 100cc Omnipaque 350   intravenous contrast.  Oral contrast was administered.  Reformatted   images in the coronal and sagittal planes were acquired.    COMPARISON: CT abdomen and pelvis 2023.      FINDINGS:    LOWER CHEST: Trace left pleural effusion. Bilateral lower lobe   subsegmental atelectasis.    HEPATOBILIARY/PANCREAS: Post pylorus-sparing pancreaticoduodenectomy with   placement of a choledochojejunostomy stent and pancreaticojejunostomy   stent. Right upper quadrant JANICE drain. Moderate amount of fat stranding   and small amount of free fluid in the right upper quadrant, likely   postsurgical.    Hepatic steatosis. No significant dilation of the intrahepatic bile   ducts. Unchanged subcentimeter hypodensity in the right hepatic dome too   small to characterize.    SPLEEN: Unremarkable.    ADRENAL GLANDS: Unremarkable.    KIDNEYS: Symmetric renal enhancement. No hydronephrosis.    ABDOMINOPELVIC NODES: Unremarkable.    PELVIC ORGANS: Foci of air in the bladder, likely from prior   instrumentation.    PERITONEUM/MESENTERY/BOWEL: Oral contrast is seen up tothe ileum. No   evidence of bowel obstruction or pneumoperitoneum. Small amount of pelvic   free fluid. Normal caliber appendix.    BONES/SOFT TISSUES: Soft tissue edema of the right flank abdominal wall   muscular/fascial layer. No evidence of acutedrainable collection.   Degenerative changes of the spine.    OTHER: Mild scattered atherosclerotic plaque.      IMPRESSION:    No evidence of acute abdominal pathology.    Post pancreaticoduodenectomy with expected recent postoperative change.    --- End of Report ---          QUIN FRITZ MD; Resident Radiologist  This document has been electronically signed.  LEXX LAINEZ MD; Attending Radiologist  This document has been electronically signed. 2023 11:58PM (23 @ 16:43)       Gastroenterology Consultation:    Patient is a 63y old  Female who presents with a chief complaint of S/P whipple with melena (10 Apr 2023 01:24)    Admitted on: 23      HPI:  63 y.o F w/ Hx of HTN, HLD, DM, and hx of MD-IPMN s/p pylorus-preserving Whipple (Pancreaticoduodenectomy) on 4/3/2023 (low-grade intestinal type w/ no HGD or malignancy), presenting for pre-syncope and reported episodes of black stools, being evaluated for concern of UGIB.  patient had an uncomplicated post-operative course and was discharged on  but presented back on  w/ endorses weakness and 2 episodes of black stool.  she was admitted to surgical service.    reported tarry stool 4pm yesterday (last bm).   Denies fevers/chills, chest pain, shortness of breath, syncope.   endorses 5/10 diffuse abdominal pain with no specific exacerbating or alleviating factors.   GI is called for evaluation and concern for an UGIB.       EUS (Upper) Procedure Report    Date: 2022 3:15 PM    Patient Name: JEREMIAS IVEY    MRN: [id number]    Account Number:    [id number]    Gender: Female     (age): 1960 (62)    Attending/Fellow:    Edmund Charlton MD      EGD: nonerosive gastritis (biopsy).      EUS: slightly hypoechoic, homogeneous, regularly shaped, and poorly  circumscribed, subtle lesion measuring around 10 x 8.2 mm at the level of the  genu, abutting the PD, resulting in an extrinsic PD stricture and PD dilation  upstream in the body and tail of pancreas. The PD measured 4 mm in the genu  around the level of the stricture and had a solid intra-ductal hyperechoic  nodule measuring 6.4 x 6.4 mm. The pancreatic lesion was sampled using a 22 g  FNB and adequate tissue was obtained with JOSÉ LUIS and KELLIE. In the body and tail of  pancreas, the parenchyma was slightly atrophied and the PD was dilated to 3-4  mm. Otherwise unremarkable exam of the pancreatic head and uncinate process.    EUS: 7 x 6.6 mm portal LN that appeared hypoechoic, homogeneous, irregularly  shaped and well circumscribed that was too small to sample.      PAST MEDICAL & SURGICAL HISTORY:  HTN (hypertension)      High cholesterol      Diabetes      COPD (chronic obstructive pulmonary disease)      Kidney stones      No significant past surgical history            FAMILY HISTORY:  -ve for GI malignancy     Social History:  Tobacco: denies  Alcohol: denies  Drugs: denies    Home Medications:  acetaminophen 325 mg oral tablet: 2 tab(s) orally every 6 hours as needed for  pain (2023 10:59)  Benicar: orally once a day (2023 06:33)  Lipitor: orally once a day (2023 06:33)  metFORMIN:  (2023 06:33)        MEDICATIONS  (STANDING):  ertapenem  IVPB 1000 milliGRAM(s) IV Intermittent every 24 hours  insulin lispro (ADMELOG) corrective regimen sliding scale   SubCutaneous every 6 hours  lactated ringers. 1000 milliLiter(s) (110 mL/Hr) IV Continuous <Continuous>  losartan 100 milliGRAM(s) Oral daily  pantoprazole  Injectable 40 milliGRAM(s) IV Push every 12 hours  sucralfate suspension 1 Gram(s) Oral every 6 hours    MEDICATIONS  (PRN):  dextrose Oral Gel 15 Gram(s) Oral once PRN Blood Glucose LESS THAN 70 milliGRAM(s)/deciliter  HYDROmorphone  Injectable 0.5 milliGRAM(s) IV Push every 6 hours PRN Severe Pain (7 - 10)      Allergies  No Known Allergies      Review of Systems:   Constitutional:  No Fever, No Chills  ENT/Mouth:  No Hearing Changes,  No Difficulty Swallowing  Eyes:  No Eye Pain, No Vision Changes  Cardiovascular:  No Chest Pain, No Palpitations  Respiratory:  No Cough, No Dyspnea  Gastrointestinal:  As described in HPI  Musculoskeletal:  No Joint Swelling, No Back Pain  Skin:  No Skin Lesions, No Jaundice  Neuro:  No Syncope, No Dizziness  Heme/Lymph:  No Bruising, No Bleeding.          Physical Examination:  T(C): 36.4 (04-10-23 @ 08:37), Max: 37.4 (23 @ 14:08)  HR: 111 (04-10-23 @ 08:37) (94 - 111)  BP: 128/76 (04-10-23 @ 08:37) (104/68 - 137/72)  RR: 18 (04-10-23 @ 08:37) (18 - 20)  SpO2: 95% (04-10-23 @ 08:37) (95% - 100%)  Height (cm): 160 (23 @ 20:20)  Weight (kg): 74 (23 @ 20:20)    23 @ 07:01  -  04-10-23 @ 07:00  --------------------------------------------------------  IN: 1170 mL / OUT: 467.5 mL / NET: 702.5 mL          GENERAL: AAOx3, no acute distress.  HEAD:  Atraumatic, Normocephalic  EYES: conjunctiva and sclera clear  NECK: Supple, no JVD or thyromegaly  CHEST/LUNG: Clear to auscultation bilaterally; No wheeze, rhonchi, or rales  HEART: Regular rate and rhythm; normal S1, S2, No murmurs.  ABDOMEN: Soft, nondistended; Bowel sounds present, tender RUQ and epigastric areas, clean wound site with wound vac noted, drains in place  NEUROLOGY: No asterixis or tremor.   SKIN: Intact, no jaundice        Data:                        7.2    7.91  )-----------( 288      ( 10 Apr 2023 06:10 )             22.0     Hgb Trend:  7.2  04-10-23 @ 06:10  7.5  23 @ 23:00  8.6  23 @ 14:39  8.5  23 @ 21:09        04-10    139  |  108  |  9<L>  ----------------------------<  148<H>  4.1   |  18  |  0.6<L>    Ca    8.8      10 Apr 2023 06:10  Phos  3.4       Mg     1.8         TPro  5.4<L>  /  Alb  3.0<L>  /  TBili  2.4<H>  /  DBili  2.1<H>  /  AST  736<H>  /  ALT  604<H>  /  AlkPhos  1108<H>  04-10    Liver panel trend:  TBili 2.4   /      /      /   AlkP 1108   /   Tptn 5.4   /   Alb 3.0    /   DBili 2.1      04-10  TBili 3.4   /      /      /   AlkP 1050   /   Tptn 5.1   /   Alb 3.0    /   DBili 3.1      04-09  TBili 3.3   /      /      /   AlkP 1146   /   Tptn 5.8   /   Alb 3.3    /   DBili 3.0      04-09  TBili 0.8   /   AST 93   /   ALT 63   /   AlkP 444   /   Tptn 5.5   /   Alb 3.2    /   DBili 0.2      04-07  TBili 0.7   /   AST 25   /   ALT 23   /   AlkP 201   /   Tptn 5.1   /   Alb 3.0    /   DBili 0.2      04-06  TBili 1.0   /   AST 28   /   ALT 27   /   AlkP 90   /   Tptn 5.0   /   Alb 3.1    /   DBili 0.2      04-06  TBili 1.2   /   AST 38   /   ALT 35   /   AlkP 49   /   Tptn 5.0   /   Alb 3.2    /   DBili 0.4      04-05  TBili 1.1   /   AST 54   /   ALT 63   /   AlkP 62   /   Tptn 6.4   /   Alb 4.2    /   DBili 0.3      04-03      PT/INR - ( 2023 14:39 )   PT: 12.90 sec;   INR: 1.13 ratio         PTT - ( 2023 14:39 )  PTT:34.1 sec        Radiology:  CT Abdomen and Pelvis w/ Oral Cont and w/ IV Cont:   ACC: [id number] EXAM:  CT ABDOMEN AND PELVIS OC IC   ORDERED BY: TAYLER CORNELL     PROCEDURE DATE:  2023          INTERPRETATION:  CLINICAL STATEMENT: Gastrointestinal bleed. Pancreatic   mass. Pancreatic head mass status post pylorus-sparing   pancreaticoduodenectomy on 4/3/2023    TECHNIQUE: Contiguous axial CT images were obtained from the lower chest   to the pubic symphysis following administration of 100cc Omnipaque 350   intravenous contrast.  Oral contrast was administered.  Reformatted   images in the coronal and sagittal planes were acquired.    COMPARISON: CT abdomen and pelvis 2023.      FINDINGS:    LOWER CHEST: Trace left pleural effusion. Bilateral lower lobe   subsegmental atelectasis.    HEPATOBILIARY/PANCREAS: Post pylorus-sparing pancreaticoduodenectomy with   placement of a choledochojejunostomy stent and pancreaticojejunostomy   stent. Right upper quadrant JANICE drain. Moderate amount of fat stranding   and small amount of free fluid in the right upper quadrant, likely   postsurgical.    Hepatic steatosis. No significant dilation of the intrahepatic bile   ducts. Unchanged subcentimeter hypodensity in the right hepatic dome too   small to characterize.    SPLEEN: Unremarkable.    ADRENAL GLANDS: Unremarkable.    KIDNEYS: Symmetric renal enhancement. No hydronephrosis.    ABDOMINOPELVIC NODES: Unremarkable.    PELVIC ORGANS: Foci of air in the bladder, likely from prior   instrumentation.    PERITONEUM/MESENTERY/BOWEL: Oral contrast is seen up tothe ileum. No   evidence of bowel obstruction or pneumoperitoneum. Small amount of pelvic   free fluid. Normal caliber appendix.    BONES/SOFT TISSUES: Soft tissue edema of the right flank abdominal wall   muscular/fascial layer. No evidence of acutedrainable collection.   Degenerative changes of the spine.    OTHER: Mild scattered atherosclerotic plaque.      IMPRESSION:    No evidence of acute abdominal pathology.    Post pancreaticoduodenectomy with expected recent postoperative change.    --- End of Report ---      QUIN FRITZ MD; Resident Radiologist  This document has been electronically signed.  LEXX LAINEZ MD; Attending Radiologist  This document has been electronically signed. 2023 11:58PM (23 @ 16:43)

## 2023-04-10 NOTE — CONSULT NOTE ADULT - ASSESSMENT
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 GI is called for evaluation.     # Anemia  # Reported Melena  # Recent Pancreaticoduodenectomy   - patient is hemodynamically stable  - labs reviewed  - CT scan reviewed  - prior EGD/EUS (as above) and pathology reports reviewed     recommendations:  - NPO  - PPI BID  - Active type and screen  - Will plan for EGD today (risks/benefits/alternatives were discussed)     # Elevated liver enzymes:  TBili 2.4   /      /      /   AlkP 1108   /   Tptn 5.4   /   Alb 3.0    /   DBili 2.1      04-10  TBili 3.4   /      /      /   AlkP 1050   /   Tptn 5.1   /   Alb 3.0    /   DBili 3.1      04-09  TBili 3.3   /      /      /   AlkP 1146   /   Tptn 5.8   /   Alb 3.3    /   DBili 3.0      04-09    recommendations:  - send viral hepatitis panel  - trend LFTs  - get US RUQ  - avoid hepatotoxic  63 y.o F w/ Hx of HTN, HLD, DM, and hx of MD-IPMN s/p pylorus-preserving Whipple (Pancreaticoduodenectomy) on 4/3/2023 (low-grade intestinal type w/ no HGD or malignancy), presenting for pre-syncope and reported episodes of black stools, being evaluated for concern of UGIB.    # Anemia  # Reported Melena  # Recent Pylorus-preserving Whipple Pancreaticoduodenectomy   - patient is hemodynamically stable  - labs reviewed  - CT scan reviewed  - prior EGD/EUS (as above) and pathology reports reviewed     recommendations:  - NPO for now  - IV PPI BID  - Active type and screen  - Will plan for EGD today (risks/benefits/alternatives were discussed)     # Elevated liver enzymes:  likely due to edema at the hepaticojejunostomy vs ? occlusion from a clot in the setting of bleed at the hepaticojejunostomy  TBili 2.4   /      /      /   AlkP 1108   /   Tptn 5.4   /   Alb 3.0    /   DBili 2.1      04-10  TBili 3.4   /      /      /   AlkP 1050   /   Tptn 5.1   /   Alb 3.0    /   DBili 3.1      04-09  TBili 3.3   /      /      /   AlkP 1146   /   Tptn 5.8   /   Alb 3.3    /   DBili 3.0      04-09    recommendations:  - trend LFTs  - get US RUQ w/ doppler  - send viral hepatitis panel for completion (HAV IgG, HBcAb, HBsAb, HBsAg, HCV ab)  - avoid hepatotoxic agents    we will follow closely

## 2023-04-10 NOTE — CONSULT NOTE ADULT - ATTENDING COMMENTS
I edited the note.   Time-based billing (NON-critical care).   80 minutes spent on total encounter; more than 50% of the visit was spent counseling and / or coordinating care by the attending physician.  The necessity of the time spent during the encounter on this date of service was due to: Coordination of care.
I have personally seen and examined this patient.  I have fully participated in the care of this patient.  I have reviewed all pertinent clinical information, including history, physical exam, plan and note.  I have reviewed all pertinent clinical information and reviewed all relevant imaging and diagnostic studies personally.  My addendum includes the final recommendations.      #Sepsis ruled out on admission   Pt was never on ertapenem at home, unclear where this history came from   Afebrile  No leukocytosis  no Current evidence of active infection    Pending GI procedure, if evidence of infection/cholangitis, can start Zosyn 3.375 q8h IV     If any questions, please call or send a message on [employer] Teams  Please continue to update ID with any pertinent new laboratory or radiographic findings  Spectra [id number]

## 2023-04-10 NOTE — PROGRESS NOTE ADULT - ASSESSMENT
63yFemale with hx of Pancreatic head IPMN, s/p pancreaticoduodenectomy, presenting with Postoperative pain, and melanotic stools    PLAN:  #Upper GI Bleed  #Melena   - Monitor Hgb, transfuse > 7 PRN   - PPI BID   - Hemodynamic monitoring   - Multimodal pain control   - Pulmonary: Incentive Spirometry q1 hr while awake   - GI/FEN: , Monitor Strict Intake/Output [id number], Replete Electrolytes PRN,    - ABX:  Continue home Ertapenem    - Holding DVT ppx for now    [facility]  SPECTRA [id number]

## 2023-04-11 LAB
ALBUMIN SERPL ELPH-MCNC: 2.9 G/DL — LOW (ref 3.5–5.2)
ALP SERPL-CCNC: 689 U/L — HIGH (ref 30–115)
ALT FLD-CCNC: 217 U/L — HIGH (ref 0–41)
AMYLASE P1 CFR SERPL: 61 U/L — SIGNIFICANT CHANGE UP (ref 25–115)
ANION GAP SERPL CALC-SCNC: 10 MMOL/L — SIGNIFICANT CHANGE UP (ref 7–14)
AST SERPL-CCNC: 62 U/L — HIGH (ref 0–41)
BASOPHILS # BLD AUTO: 0.02 K/UL — SIGNIFICANT CHANGE UP (ref 0–0.2)
BASOPHILS NFR BLD AUTO: 0.2 % — SIGNIFICANT CHANGE UP (ref 0–1)
BILIRUB DIRECT SERPL-MCNC: 0.4 MG/DL — HIGH (ref 0–0.3)
BILIRUB INDIRECT FLD-MCNC: 0.5 MG/DL — SIGNIFICANT CHANGE UP (ref 0.2–1.2)
BILIRUB SERPL-MCNC: 0.9 MG/DL — SIGNIFICANT CHANGE UP (ref 0.2–1.2)
BUN SERPL-MCNC: 6 MG/DL — LOW (ref 10–20)
CALCIUM SERPL-MCNC: 7.9 MG/DL — LOW (ref 8.4–10.5)
CHLORIDE SERPL-SCNC: 101 MMOL/L — SIGNIFICANT CHANGE UP (ref 98–110)
CO2 SERPL-SCNC: 23 MMOL/L — SIGNIFICANT CHANGE UP (ref 17–32)
CREAT SERPL-MCNC: 0.5 MG/DL — LOW (ref 0.7–1.5)
EGFR: 106 ML/MIN/1.73M2 — SIGNIFICANT CHANGE UP
EOSINOPHIL # BLD AUTO: 0.06 K/UL — SIGNIFICANT CHANGE UP (ref 0–0.7)
EOSINOPHIL NFR BLD AUTO: 0.6 % — SIGNIFICANT CHANGE UP (ref 0–8)
GLUCOSE BLDC GLUCOMTR-MCNC: 131 MG/DL — HIGH (ref 70–99)
GLUCOSE BLDC GLUCOMTR-MCNC: 137 MG/DL — HIGH (ref 70–99)
GLUCOSE BLDC GLUCOMTR-MCNC: 145 MG/DL — HIGH (ref 70–99)
GLUCOSE BLDC GLUCOMTR-MCNC: 146 MG/DL — HIGH (ref 70–99)
GLUCOSE BLDC GLUCOMTR-MCNC: 151 MG/DL — HIGH (ref 70–99)
GLUCOSE SERPL-MCNC: 110 MG/DL — HIGH (ref 70–99)
HAV IGM SER-ACNC: SIGNIFICANT CHANGE UP
HBV CORE IGM SER-ACNC: SIGNIFICANT CHANGE UP
HBV SURFACE AG SER-ACNC: SIGNIFICANT CHANGE UP
HCT VFR BLD CALC: 19.1 % — LOW (ref 37–47)
HCV AB S/CO SERPL IA: 0.08 S/CO — SIGNIFICANT CHANGE UP (ref 0–0.99)
HCV AB SERPL-IMP: SIGNIFICANT CHANGE UP
HGB BLD-MCNC: 6.3 G/DL — CRITICAL LOW (ref 12–16)
IMM GRANULOCYTES NFR BLD AUTO: 3.1 % — HIGH (ref 0.1–0.3)
LIDOCAIN IGE QN: 150 U/L — HIGH (ref 7–60)
LIDOCAIN IGE QN: 162 U/L — HIGH (ref 7–60)
LYMPHOCYTES # BLD AUTO: 1.04 K/UL — LOW (ref 1.2–3.4)
LYMPHOCYTES # BLD AUTO: 11 % — LOW (ref 20.5–51.1)
MAGNESIUM SERPL-MCNC: 1.9 MG/DL — SIGNIFICANT CHANGE UP (ref 1.8–2.4)
MCHC RBC-ENTMCNC: 30.4 PG — SIGNIFICANT CHANGE UP (ref 27–31)
MCHC RBC-ENTMCNC: 33 G/DL — SIGNIFICANT CHANGE UP (ref 32–37)
MCV RBC AUTO: 92.3 FL — SIGNIFICANT CHANGE UP (ref 81–99)
MONOCYTES # BLD AUTO: 0.52 K/UL — SIGNIFICANT CHANGE UP (ref 0.1–0.6)
MONOCYTES NFR BLD AUTO: 5.5 % — SIGNIFICANT CHANGE UP (ref 1.7–9.3)
NEUTROPHILS # BLD AUTO: 7.54 K/UL — HIGH (ref 1.4–6.5)
NEUTROPHILS NFR BLD AUTO: 79.6 % — HIGH (ref 42.2–75.2)
NRBC # BLD: 0 /100 WBCS — SIGNIFICANT CHANGE UP (ref 0–0)
PHOSPHATE SERPL-MCNC: 2.9 MG/DL — SIGNIFICANT CHANGE UP (ref 2.1–4.9)
PLATELET # BLD AUTO: 288 K/UL — SIGNIFICANT CHANGE UP (ref 130–400)
PMV BLD: 10 FL — SIGNIFICANT CHANGE UP (ref 7.4–10.4)
POTASSIUM SERPL-MCNC: 3.5 MMOL/L — SIGNIFICANT CHANGE UP (ref 3.5–5)
POTASSIUM SERPL-SCNC: 3.5 MMOL/L — SIGNIFICANT CHANGE UP (ref 3.5–5)
PROT SERPL-MCNC: 5.1 G/DL — LOW (ref 6–8)
RBC # BLD: 2.07 M/UL — LOW (ref 4.2–5.4)
RBC # FLD: 12.6 % — SIGNIFICANT CHANGE UP (ref 11.5–14.5)
SODIUM SERPL-SCNC: 134 MMOL/L — LOW (ref 135–146)
WBC # BLD: 9.47 K/UL — SIGNIFICANT CHANGE UP (ref 4.8–10.8)
WBC # FLD AUTO: 9.47 K/UL — SIGNIFICANT CHANGE UP (ref 4.8–10.8)

## 2023-04-11 PROCEDURE — 71045 X-RAY EXAM CHEST 1 VIEW: CPT | Mod: 26

## 2023-04-11 PROCEDURE — 99233 SBSQ HOSP IP/OBS HIGH 50: CPT

## 2023-04-11 PROCEDURE — 99233 SBSQ HOSP IP/OBS HIGH 50: CPT | Mod: 24

## 2023-04-11 RX ORDER — SODIUM CHLORIDE 9 MG/ML
1000 INJECTION, SOLUTION INTRAVENOUS
Refills: 0 | Status: DISCONTINUED | OUTPATIENT
Start: 2023-04-11 | End: 2023-04-15

## 2023-04-11 RX ORDER — POTASSIUM CHLORIDE 20 MEQ
20 PACKET (EA) ORAL ONCE
Refills: 0 | Status: COMPLETED | OUTPATIENT
Start: 2023-04-11 | End: 2023-04-11

## 2023-04-11 RX ORDER — ACETAMINOPHEN 500 MG
1000 TABLET ORAL ONCE
Refills: 0 | Status: COMPLETED | OUTPATIENT
Start: 2023-04-11 | End: 2023-04-11

## 2023-04-11 RX ORDER — MAGNESIUM SULFATE 500 MG/ML
2 VIAL (ML) INJECTION ONCE
Refills: 0 | Status: COMPLETED | OUTPATIENT
Start: 2023-04-11 | End: 2023-04-11

## 2023-04-11 RX ADMIN — PANTOPRAZOLE SODIUM 40 MILLIGRAM(S): 20 TABLET, DELAYED RELEASE ORAL at 06:06

## 2023-04-11 RX ADMIN — ENOXAPARIN SODIUM 40 MILLIGRAM(S): 100 INJECTION SUBCUTANEOUS at 06:09

## 2023-04-11 RX ADMIN — PIPERACILLIN AND TAZOBACTAM 25 GRAM(S): 4; .5 INJECTION, POWDER, LYOPHILIZED, FOR SOLUTION INTRAVENOUS at 09:35

## 2023-04-11 RX ADMIN — HYDROMORPHONE HYDROCHLORIDE 0.5 MILLIGRAM(S): 2 INJECTION INTRAMUSCULAR; INTRAVENOUS; SUBCUTANEOUS at 09:29

## 2023-04-11 RX ADMIN — Medication 1 GRAM(S): at 17:42

## 2023-04-11 RX ADMIN — Medication 1 GRAM(S): at 23:13

## 2023-04-11 RX ADMIN — Medication 1 GRAM(S): at 00:08

## 2023-04-11 RX ADMIN — HYDROMORPHONE HYDROCHLORIDE 0.5 MILLIGRAM(S): 2 INJECTION INTRAMUSCULAR; INTRAVENOUS; SUBCUTANEOUS at 03:21

## 2023-04-11 RX ADMIN — PIPERACILLIN AND TAZOBACTAM 25 GRAM(S): 4; .5 INJECTION, POWDER, LYOPHILIZED, FOR SOLUTION INTRAVENOUS at 21:06

## 2023-04-11 RX ADMIN — Medication 1 GRAM(S): at 11:31

## 2023-04-11 RX ADMIN — Medication 2: at 18:00

## 2023-04-11 RX ADMIN — PIPERACILLIN AND TAZOBACTAM 25 GRAM(S): 4; .5 INJECTION, POWDER, LYOPHILIZED, FOR SOLUTION INTRAVENOUS at 15:01

## 2023-04-11 RX ADMIN — PIPERACILLIN AND TAZOBACTAM 25 GRAM(S): 4; .5 INJECTION, POWDER, LYOPHILIZED, FOR SOLUTION INTRAVENOUS at 01:20

## 2023-04-11 RX ADMIN — Medication 1000 MILLIGRAM(S): at 13:23

## 2023-04-11 RX ADMIN — Medication 1 GRAM(S): at 06:07

## 2023-04-11 RX ADMIN — PANTOPRAZOLE SODIUM 40 MILLIGRAM(S): 20 TABLET, DELAYED RELEASE ORAL at 17:43

## 2023-04-11 RX ADMIN — Medication 25 GRAM(S): at 03:22

## 2023-04-11 RX ADMIN — HYDROMORPHONE HYDROCHLORIDE 0.5 MILLIGRAM(S): 2 INJECTION INTRAMUSCULAR; INTRAVENOUS; SUBCUTANEOUS at 21:06

## 2023-04-11 RX ADMIN — HYDROMORPHONE HYDROCHLORIDE 0.5 MILLIGRAM(S): 2 INJECTION INTRAMUSCULAR; INTRAVENOUS; SUBCUTANEOUS at 09:44

## 2023-04-11 RX ADMIN — Medication 50 MILLIEQUIVALENT(S): at 06:10

## 2023-04-11 RX ADMIN — Medication 400 MILLIGRAM(S): at 13:08

## 2023-04-11 RX ADMIN — HYDROMORPHONE HYDROCHLORIDE 0.5 MILLIGRAM(S): 2 INJECTION INTRAMUSCULAR; INTRAVENOUS; SUBCUTANEOUS at 03:51

## 2023-04-11 NOTE — PROGRESS NOTE ADULT - SUBJECTIVE AND OBJECTIVE BOX
Gastroenterology progress note:     Patient is a 63y old  Female who presents with a chief complaint of S/P whipple with melena (11 Apr 2023 09:01)       Admitted on: 04-09-23    We are following the patient for: melena and elevated liver enzymes        Interval History:    s/p EGD yesterday   no bm  no bleeding events  endorses mild abdominal pain       PAST MEDICAL & SURGICAL HISTORY:  HTN (hypertension)      High cholesterol      Diabetes      COPD (chronic obstructive pulmonary disease)      Kidney stones      No significant past surgical history          MEDICATIONS  (STANDING):  enoxaparin Injectable 40 milliGRAM(s) SubCutaneous every 24 hours  insulin lispro (ADMELOG) corrective regimen sliding scale   SubCutaneous every 6 hours  lactated ringers. 1000 milliLiter(s) (110 mL/Hr) IV Continuous <Continuous>  pantoprazole  Injectable 40 milliGRAM(s) IV Push every 12 hours  piperacillin/tazobactam IVPB.. 3.375 Gram(s) IV Intermittent every 8 hours  sodium chloride 0.9% Bolus 250 milliLiter(s) IV Bolus once  sucralfate suspension 1 Gram(s) Oral every 6 hours    MEDICATIONS  (PRN):  HYDROmorphone  Injectable 0.5 milliGRAM(s) IV Push every 6 hours PRN Severe Pain (7 - 10)      Allergies  No Known Allergies      Review of Systems:   Cardiovascular:  No Chest Pain, No Palpitations  Respiratory:  No Cough, No Dyspnea  Gastrointestinal:  As described in HPI  Skin:  No Skin Lesions, No Jaundice  Neuro:  No Syncope, No Dizziness    Physical Examination:  T(C): 36.9 (04-11-23 @ 08:00), Max: 38.2 (04-10-23 @ 15:38)  HR: 93 (04-11-23 @ 13:00) (83 - 106)  BP: 134/70 (04-11-23 @ 13:00) (108/61 - 150/72)  RR: 15 (04-11-23 @ 13:00) (15 - 27)  SpO2: 100% (04-11-23 @ 13:00) (96% - 100%)      04-10-23 @ 07:01  -  04-11-23 @ 07:00  --------------------------------------------------------  IN: 1870 mL / OUT: 70 mL / NET: 1800 mL    04-11-23 @ 07:01  -  04-11-23 @ 13:43  --------------------------------------------------------  IN: 860 mL / OUT: 400 mL / NET: 460 mL        GENERAL: AAOx3, no acute distress.  HEAD:  Atraumatic, Normocephalic  EYES: conjunctiva and sclera clear  NECK: Supple, no JVD or thyromegaly  CHEST/LUNG: Clear to auscultation bilaterally; No wheeze, rhonchi, or rales  HEART: Regular rate and rhythm; normal S1, S2, No murmurs.  ABDOMEN: Soft, nondistended; Bowel sounds present, tender RUQ and epigastric areas, clean wound site with wound vac noted, drains in place  NEUROLOGY: No asterixis or tremor.   SKIN: Intact, no jaundice     Data:                        7.2    9.33  )-----------( 274      ( 10 Apr 2023 22:14 )             21.1     Hgb trend:  7.2  04-10-23 @ 22:14  7.2  04-10-23 @ 06:10  7.5  04-09-23 @ 23:00  8.6  04-09-23 @ 14:39        04-10    138  |  107  |  8<L>  ----------------------------<  143<H>  3.7   |  18  |  0.6<L>    Ca    8.2<L>      10 Apr 2023 22:14  Phos  2.4     04-10  Mg     1.7     04-10    TPro  5.5<L>  /  Alb  3.1<L>  /  TBili  1.2  /  DBili  0.6<H>  /  AST  222<H>  /  ALT  387<H>  /  AlkPhos  952<H>  04-10    Liver panel trend:  TBili 1.2   /      /      /   AlkP 952   /   Tptn 5.5   /   Alb 3.1    /   DBili 0.6      04-10  TBili 2.4   /      /      /   AlkP 1108   /   Tptn 5.4   /   Alb 3.0    /   DBili 2.1      04-10  TBili 3.4   /      /      /   AlkP 1050   /   Tptn 5.1   /   Alb 3.0    /   DBili 3.1      04-09  TBili 3.3   /      /      /   AlkP 1146   /   Tptn 5.8   /   Alb 3.3    /   DBili 3.0      04-09  TBili 0.8   /   AST 93   /   ALT 63   /   AlkP 444   /   Tptn 5.5   /   Alb 3.2    /   DBili 0.2      04-07  TBili 0.7   /   AST 25   /   ALT 23   /   AlkP 201   /   Tptn 5.1   /   Alb 3.0    /   DBili 0.2      04-06  TBili 1.0   /   AST 28   /   ALT 27   /   AlkP 90   /   Tptn 5.0   /   Alb 3.1    /   DBili 0.2      04-06  TBili 1.2   /   AST 38   /   ALT 35   /   AlkP 49   /   Tptn 5.0   /   Alb 3.2    /   DBili 0.4      04-05  TBili 1.1   /   AST 54   /   ALT 63   /   AlkP 62   /   Tptn 6.4   /   Alb 4.2    /   DBili 0.3      04-03      PT/INR - ( 09 Apr 2023 14:39 )   PT: 12.90 sec;   INR: 1.13 ratio         PTT - ( 09 Apr 2023 14:39 )  PTT:34.1 sec      Gastroenterology progress note:     Patient is a 63y old  Female who presents with a chief complaint of S/P whipple with melena (11 Apr 2023 09:01)       Admitted on: 04-09-23    We are following the patient for: melena and elevated liver enzymes        Interval History:    s/p EGD yesterday   no bm  no bleeding events  endorses mild abdominal pain       PAST MEDICAL & SURGICAL HISTORY:  HTN (hypertension)      High cholesterol      Diabetes      COPD (chronic obstructive pulmonary disease)      Kidney stones      No significant past surgical history          MEDICATIONS  (STANDING):  enoxaparin Injectable 40 milliGRAM(s) SubCutaneous every 24 hours  insulin lispro (ADMELOG) corrective regimen sliding scale   SubCutaneous every 6 hours  lactated ringers. 1000 milliLiter(s) (110 mL/Hr) IV Continuous <Continuous>  pantoprazole  Injectable 40 milliGRAM(s) IV Push every 12 hours  piperacillin/tazobactam IVPB.. 3.375 Gram(s) IV Intermittent every 8 hours  sodium chloride 0.9% Bolus 250 milliLiter(s) IV Bolus once  sucralfate suspension 1 Gram(s) Oral every 6 hours    MEDICATIONS  (PRN):  HYDROmorphone  Injectable 0.5 milliGRAM(s) IV Push every 6 hours PRN Severe Pain (7 - 10)      Allergies  No Known Allergies      Review of Systems:   Cardiovascular:  No Chest Pain, No Palpitations  Respiratory:  No Cough, No Dyspnea  Gastrointestinal:  As described in HPI  Skin:  No Skin Lesions, No Jaundice  Neuro:  No Syncope, No Dizziness    Physical Examination:  T(C): 36.9 (04-11-23 @ 08:00), Max: 38.2 (04-10-23 @ 15:38)  HR: 93 (04-11-23 @ 13:00) (83 - 106)  BP: 134/70 (04-11-23 @ 13:00) (108/61 - 150/72)  RR: 15 (04-11-23 @ 13:00) (15 - 27)  SpO2: 100% (04-11-23 @ 13:00) (96% - 100%)      04-10-23 @ 07:01  -  04-11-23 @ 07:00  --------------------------------------------------------  IN: 1870 mL / OUT: 70 mL / NET: 1800 mL    04-11-23 @ 07:01  -  04-11-23 @ 13:43  --------------------------------------------------------  IN: 860 mL / OUT: 400 mL / NET: 460 mL        GENERAL: AAOx3, no acute distress.  HEAD:  Atraumatic, Normocephalic  EYES: conjunctiva and sclera clear  NECK: Supple, no JVD or thyromegaly  CHEST/LUNG: Clear to auscultation bilaterally; No wheeze, rhonchi, or rales  HEART: Regular rate and rhythm; normal S1, S2, No murmurs.  ABDOMEN: Soft, nondistended; Bowel sounds present, tender RUQ and epigastric areas, clean wound site with wound vac noted, drains in place  NEUROLOGY: No asterixis or tremor.   SKIN: Intact, no jaundice     Data:                        7.2    9.33  )-----------( 274      ( 10 Apr 2023 22:14 )             21.1     Hgb trend:  7.2  04-10-23 @ 22:14  7.2  04-10-23 @ 06:10  7.5  04-09-23 @ 23:00  8.6  04-09-23 @ 14:39        04-10    138  |  107  |  8<L>  ----------------------------<  143<H>  3.7   |  18  |  0.6<L>    Ca    8.2<L>      10 Apr 2023 22:14  Phos  2.4     04-10  Mg     1.7     04-10    TPro  5.5<L>  /  Alb  3.1<L>  /  TBili  1.2  /  DBili  0.6<H>  /  AST  222<H>  /  ALT  387<H>  /  AlkPhos  952<H>  04-10    Liver panel trend:  TBili 1.2   /      /      /   AlkP 952   /   Tptn 5.5   /   Alb 3.1    /   DBili 0.6      04-10  TBili 2.4   /      /      /   AlkP 1108   /   Tptn 5.4   /   Alb 3.0    /   DBili 2.1      04-10  TBili 3.4   /      /      /   AlkP 1050   /   Tptn 5.1   /   Alb 3.0    /   DBili 3.1      04-09  TBili 3.3   /      /      /   AlkP 1146   /   Tptn 5.8   /   Alb 3.3    /   DBili 3.0      04-09  TBili 0.8   /   AST 93   /   ALT 63   /   AlkP 444   /   Tptn 5.5   /   Alb 3.2    /   DBili 0.2      04-07  TBili 0.7   /   AST 25   /   ALT 23   /   AlkP 201   /   Tptn 5.1   /   Alb 3.0    /   DBili 0.2      04-06  TBili 1.0   /   AST 28   /   ALT 27   /   AlkP 90   /   Tptn 5.0   /   Alb 3.1    /   DBili 0.2      04-06  TBili 1.2   /   AST 38   /   ALT 35   /   AlkP 49   /   Tptn 5.0   /   Alb 3.2    /   DBili 0.4      04-05  TBili 1.1   /   AST 54   /   ALT 63   /   AlkP 62   /   Tptn 6.4   /   Alb 4.2    /   DBili 0.3      04-03      PT/INR - ( 09 Apr 2023 14:39 )   PT: 12.90 sec;   INR: 1.13 ratio         PTT - ( 09 Apr 2023 14:39 )  PTT:34.1 sec       < from: EGD (04.10.23 @ 16:30) >  Normal mucosa in the whole esophagus.    -Gastric previous surgery - Pylorus-preserving Whipple surgery.    -Multiple small (<1 cm) clean based ulcers in the antrum.    -A marginal, circumferential anastomotic ulcer measuring around 3 cm was seen  at the duodeno-jejunotomy with surrounding edema, stigmata of recent bleeding,  and visible surgical sutures. There was no evidence of active bleeding. The  efferent limb was examined and had no obstruction but had multiple small (< 1  cm) clean based ulcers. The EGD scope was then exchanged to a PCF colonoscope  and the afferent limb was examined with bile seen and no evidence of bleeding.  The plastic CBD stent was noted to be in position.    < end of copied text >

## 2023-04-11 NOTE — PROGRESS NOTE ADULT - ASSESSMENT
63yFemale with hx of Pancreatic head IPMN, s/p pancreaticoduodenectomy, presenting with Postoperative pain, and melanotic stools    PLAN:  #Upper GI Bleed  #Melena   - Monitor Hgb, transfuse > 7 PRN   - PPI BID   - Hemodynamic monitoring   - Multimodal pain control   - Pulmonary: Incentive Spirometry q1 hr while awake   - GI/FEN: , Monitor Strict Intake/Output [id number], Replete Electrolytes PRN,    - ABX: on zosyn, f/u blood cx    - On DVT pphx   - GI recommending RUQ US, hepatitis panel     [facility]  SPECTRA [id number]

## 2023-04-11 NOTE — PROGRESS NOTE ADULT - SUBJECTIVE AND OBJECTIVE BOX
JEREMIAS IVEY  63y, Female  Allergy: No Known Allergies      LOS  2d    CHIEF COMPLAINT: S/P whipple with melena (11 Apr 2023 02:59)      INTERVAL EVENTS/HPI  - febrile after procedure, started on zosyn  - T(F): , Max: 100.8 (04-10-23 @ 15:38)  - Tolerating medication  - WBC Count: 9.33 (04-10-23 @ 22:14)  WBC Count: 7.91 (04-10-23 @ 06:10)     - Creatinine, Serum: 0.6 (04-10-23 @ 22:14)  Creatinine, Serum: 0.6 (04-10-23 @ 06:10)       ROS  ***    VITALS:  T(F): 97.1, Max: 100.8 (04-10-23 @ 15:38)  HR: 89  BP: 131/69  RR: 18Vital Signs Last 24 Hrs  T(C): 36.2 (11 Apr 2023 04:00), Max: 38.2 (10 Apr 2023 15:38)  T(F): 97.1 (11 Apr 2023 04:00), Max: 100.8 (10 Apr 2023 15:38)  HR: 89 (11 Apr 2023 06:00) (83 - 112)  BP: 131/69 (11 Apr 2023 06:00) (108/61 - 150/72)  BP(mean): 93 (11 Apr 2023 06:00) (78 - 95)  RR: 18 (10 Apr 2023 17:03) (17 - 18)  SpO2: 98% (11 Apr 2023 06:00) (94% - 99%)    Parameters below as of 11 Apr 2023 06:00  Patient On (Oxygen Delivery Method): nasal cannula  O2 Flow (L/min): 2      PHYSICAL EXAM:  ***    FH: Non-contributory  Social Hx: Non-contributory    TESTS & MEASUREMENTS:                        7.2    9.33  )-----------( 274      ( 10 Apr 2023 22:14 )             21.1     04-10    138  |  107  |  8<L>  ----------------------------<  143<H>  3.7   |  18  |  0.6<L>    Ca    8.2<L>      10 Apr 2023 22:14  Phos  2.4     04-10  Mg     1.7     04-10    TPro  5.5<L>  /  Alb  3.1<L>  /  TBili  1.2  /  DBili  0.6<H>  /  AST  222<H>  /  ALT  387<H>  /  AlkPhos  952<H>  04-10      LIVER FUNCTIONS - ( 10 Apr 2023 22:14 )  Alb: 3.1 g/dL / Pro: 5.5 g/dL / ALK PHOS: 952 U/L / ALT: 387 U/L / AST: 222 U/L / GGT: x               Culture - Body Fluid with Gram Stain (collected 04-03-23 @ 12:40)  Source: .Body Fluid None  Gram Stain (04-04-23 @ 05:15):    No polymorphonuclear cells seen    No organisms seen    by cytocentrifuge  Final Report (04-09-23 @ 09:22):    No growth at 5 days            INFECTIOUS DISEASES TESTING  COVID-19 PCR: NotDetec (04-09-23 @ 15:07)  strept    INFLAMMATORY MARKERS      RADIOLOGY & ADDITIONAL TESTS:  I have personally reviewed the last available Chest xray  CXR      CT  CT Abdomen and Pelvis w/ Oral Cont and w/ IV Cont:   ACC: [id number] EXAM:  CT ABDOMEN AND PELVIS OC IC   ORDERED BY: TAYLER CORNELL     PROCEDURE DATE:  04/09/2023          INTERPRETATION:  CLINICAL STATEMENT: Gastrointestinal bleed. Pancreatic   mass. Pancreatic head mass status post pylorus-sparing   pancreaticoduodenectomy on 4/3/2023    TECHNIQUE: Contiguous axial CT images were obtained from the lower chest   to the pubic symphysis following administration of 100cc Omnipaque 350   intravenous contrast.  Oral contrast was administered.  Reformatted   images in the coronal and sagittal planes were acquired.    COMPARISON: CT abdomen and pelvis 2/16/2023.      FINDINGS:    LOWER CHEST: Trace left pleural effusion. Bilateral lower lobe   subsegmental atelectasis.    HEPATOBILIARY/PANCREAS: Post pylorus-sparing pancreaticoduodenectomy with   placement of a choledochojejunostomy stent and pancreaticojejunostomy   stent. Right upper quadrant JANICE drain. Moderate amount of fat stranding   and small amount of free fluid in the right upper quadrant, likely   postsurgical.    Hepatic steatosis. No significant dilation of the intrahepatic bile   ducts. Unchanged subcentimeter hypodensity in the right hepatic dome too   small to characterize.    SPLEEN: Unremarkable.    ADRENAL GLANDS: Unremarkable.    KIDNEYS: Symmetric renal enhancement. No hydronephrosis.    ABDOMINOPELVIC NODES: Unremarkable.    PELVIC ORGANS: Foci of air in the bladder, likely from prior   instrumentation.    PERITONEUM/MESENTERY/BOWEL: Oral contrast is seen up tothe ileum. No   evidence of bowel obstruction or pneumoperitoneum. Small amount of pelvic   free fluid. Normal caliber appendix.    BONES/SOFT TISSUES: Soft tissue edema of the right flank abdominal wall   muscular/fascial layer. No evidence of acutedrainable collection.   Degenerative changes of the spine.    OTHER: Mild scattered atherosclerotic plaque.      IMPRESSION:    No evidence of acute abdominal pathology.    Post pancreaticoduodenectomy with expected recent postoperative change.    --- End of Report ---          QUIN FRITZ MD; Resident Radiologist  This document has been electronically signed.  LEXX LAINEZ MD; Attending Radiologist  This document has been electronically signed. Apr 9 2023 11:58PM (04-09-23 @ 16:43)      CARDIOLOGY TESTING  12 Lead ECG:   Ventricular Rate 98 BPM    Atrial Rate 98 BPM    P-R Interval 144 ms    QRS Duration 78 ms    Q-T Interval 348 ms    QTC Calculation(Bazett) 444 ms    P Axis 37 degrees    R Axis 0 degrees    T Axis 1 degrees    Diagnosis Line Normal sinus rhythm  Inferior infarct , age undetermined  Abnormal ECG    Confirmed by Elisha Moses MD ([id number]) on 4/10/2023 8:29:51 AM (04-09-23 @ 17:46)  12 Lead ECG:   Ventricular Rate 87 BPM    Atrial Rate 87 BPM    P-R Interval 164 ms    QRS Duration 80 ms    Q-T Interval 372 ms    QTC Calculation(Bazett) 447 ms    P Axis 46 degrees    R Axis -4 degrees    T Axis 3 degrees    Diagnosis Line Normal sinus rhythm  Inferior infarct , age undetermined  Nonspecific T wave abnormality  Abnormal ECG    Confirmed by Elisha Moses MD ([id number]) on 4/4/2023 9:20:20 AM (04-03-23 @ 21:27)      MEDICATIONS  acetaminophen   IVPB .. 1000 IV Intermittent once  enoxaparin Injectable 40 SubCutaneous every 24 hours  insulin lispro (ADMELOG) corrective regimen sliding scale  SubCutaneous every 6 hours  lactated ringers. 1000 IV Continuous <Continuous>  pantoprazole  Injectable 40 IV Push every 12 hours  piperacillin/tazobactam IVPB.- 3.375 IV Intermittent once  piperacillin/tazobactam IVPB.. 3.375 IV Intermittent every 8 hours  sodium chloride 0.9% Bolus 250 IV Bolus once  sucralfate suspension 1 Oral every 6 hours      WEIGHT  Weight (kg): 68.9 (04-10-23 @ 12:07)  Creatinine, Serum: 0.6 mg/dL (04-10-23 @ 22:14)      ANTIBIOTICS:  piperacillin/tazobactam IVPB.- 3.375 Gram(s) IV Intermittent once  piperacillin/tazobactam IVPB.. 3.375 Gram(s) IV Intermittent every 8 hours      All available historical records have been reviewed       JEREMIAS IVEY  63y, Female  Allergy: No Known Allergies      LOS  2d    CHIEF COMPLAINT: S/P whipple with melena (11 Apr 2023 02:59)      INTERVAL EVENTS/HPI  - febrile after procedure, started on zosyn, + abd pain  - T(F): , Max: 100.8 (04-10-23 @ 15:38)  - Tolerating medication  - WBC Count: 9.33 (04-10-23 @ 22:14)  WBC Count: 7.91 (04-10-23 @ 06:10)     - Creatinine, Serum: 0.6 (04-10-23 @ 22:14)  Creatinine, Serum: 0.6 (04-10-23 @ 06:10)       ROS  General: Denies rigors, nightsweats  HEENT: Denies headache, rhinorrhea, sore throat, eye pain  CV: Denies CP, palpitations  PULM: Denies wheezing, hemoptysis  GI: as noted above   : Denies discharge, hematuria  MSK: Denies arthralgias, myalgias  SKIN: Denies rash, lesions  NEURO: Denies paresthesias, weakness  PSYCH: Denies depression, anxiety     VITALS:  T(F): 97.1, Max: 100.8 (04-10-23 @ 15:38)  HR: 89  BP: 131/69  RR: 18Vital Signs Last 24 Hrs  T(C): 36.2 (11 Apr 2023 04:00), Max: 38.2 (10 Apr 2023 15:38)  T(F): 97.1 (11 Apr 2023 04:00), Max: 100.8 (10 Apr 2023 15:38)  HR: 89 (11 Apr 2023 06:00) (83 - 112)  BP: 131/69 (11 Apr 2023 06:00) (108/61 - 150/72)  BP(mean): 93 (11 Apr 2023 06:00) (78 - 95)  RR: 18 (10 Apr 2023 17:03) (17 - 18)  SpO2: 98% (11 Apr 2023 06:00) (94% - 99%)    Parameters below as of 11 Apr 2023 06:00  Patient On (Oxygen Delivery Method): nasal cannula  O2 Flow (L/min): 2      PHYSICAL EXAM:  Gen: NAD, resting in bed  HEENT: Normocephalic, atraumatic  Neck: supple, no lymphadenopathy  CV: Regular rate & regular rhythm  Lungs: decreased BS at bases, no fremitus  Abdomen: Soft, BS present RUQ and epigastric tenderness to palpation   Ext: Warm, well perfused  Neuro: non focal, awake  Skin: no rash, no erythema  Lines: no phlebitis     FH: Non-contributory  Social Hx: Non-contributory    TESTS & MEASUREMENTS:                        7.2    9.33  )-----------( 274      ( 10 Apr 2023 22:14 )             21.1     04-10    138  |  107  |  8<L>  ----------------------------<  143<H>  3.7   |  18  |  0.6<L>    Ca    8.2<L>      10 Apr 2023 22:14  Phos  2.4     04-10  Mg     1.7     04-10    TPro  5.5<L>  /  Alb  3.1<L>  /  TBili  1.2  /  DBili  0.6<H>  /  AST  222<H>  /  ALT  387<H>  /  AlkPhos  952<H>  04-10      LIVER FUNCTIONS - ( 10 Apr 2023 22:14 )  Alb: 3.1 g/dL / Pro: 5.5 g/dL / ALK PHOS: 952 U/L / ALT: 387 U/L / AST: 222 U/L / GGT: x               Culture - Body Fluid with Gram Stain (collected 04-03-23 @ 12:40)  Source: .Body Fluid None  Gram Stain (04-04-23 @ 05:15):    No polymorphonuclear cells seen    No organisms seen    by cytocentrifuge  Final Report (04-09-23 @ 09:22):    No growth at 5 days            INFECTIOUS DISEASES TESTING  COVID-19 PCR: NotDetec (04-09-23 @ 15:07)  strept    INFLAMMATORY MARKERS      RADIOLOGY & ADDITIONAL TESTS:  I have personally reviewed the last available Chest xray  CXR      CT  CT Abdomen and Pelvis w/ Oral Cont and w/ IV Cont:   ACC: [id number] EXAM:  CT ABDOMEN AND PELVIS OC IC   ORDERED BY: TAYLER CORNELL     PROCEDURE DATE:  04/09/2023          INTERPRETATION:  CLINICAL STATEMENT: Gastrointestinal bleed. Pancreatic   mass. Pancreatic head mass status post pylorus-sparing   pancreaticoduodenectomy on 4/3/2023    TECHNIQUE: Contiguous axial CT images were obtained from the lower chest   to the pubic symphysis following administration of 100cc Omnipaque 350   intravenous contrast.  Oral contrast was administered.  Reformatted   images in the coronal and sagittal planes were acquired.    COMPARISON: CT abdomen and pelvis 2/16/2023.      FINDINGS:    LOWER CHEST: Trace left pleural effusion. Bilateral lower lobe   subsegmental atelectasis.    HEPATOBILIARY/PANCREAS: Post pylorus-sparing pancreaticoduodenectomy with   placement of a choledochojejunostomy stent and pancreaticojejunostomy   stent. Right upper quadrant JANICE drain. Moderate amount of fat stranding   and small amount of free fluid in the right upper quadrant, likely   postsurgical.    Hepatic steatosis. No significant dilation of the intrahepatic bile   ducts. Unchanged subcentimeter hypodensity in the right hepatic dome too   small to characterize.    SPLEEN: Unremarkable.    ADRENAL GLANDS: Unremarkable.    KIDNEYS: Symmetric renal enhancement. No hydronephrosis.    ABDOMINOPELVIC NODES: Unremarkable.    PELVIC ORGANS: Foci of air in the bladder, likely from prior   instrumentation.    PERITONEUM/MESENTERY/BOWEL: Oral contrast is seen up tothe ileum. No   evidence of bowel obstruction or pneumoperitoneum. Small amount of pelvic   free fluid. Normal caliber appendix.    BONES/SOFT TISSUES: Soft tissue edema of the right flank abdominal wall   muscular/fascial layer. No evidence of acutedrainable collection.   Degenerative changes of the spine.    OTHER: Mild scattered atherosclerotic plaque.      IMPRESSION:    No evidence of acute abdominal pathology.    Post pancreaticoduodenectomy with expected recent postoperative change.    --- End of Report ---          QUIN FRITZ MD; Resident Radiologist  This document has been electronically signed.  LEXX LAINEZ MD; Attending Radiologist  This document has been electronically signed. Apr 9 2023 11:58PM (04-09-23 @ 16:43)      CARDIOLOGY TESTING  12 Lead ECG:   Ventricular Rate 98 BPM    Atrial Rate 98 BPM    P-R Interval 144 ms    QRS Duration 78 ms    Q-T Interval 348 ms    QTC Calculation(Bazett) 444 ms    P Axis 37 degrees    R Axis 0 degrees    T Axis 1 degrees    Diagnosis Line Normal sinus rhythm  Inferior infarct , age undetermined  Abnormal ECG    Confirmed by Elisha Moses MD ([id number]) on 4/10/2023 8:29:51 AM (04-09-23 @ 17:46)  12 Lead ECG:   Ventricular Rate 87 BPM    Atrial Rate 87 BPM    P-R Interval 164 ms    QRS Duration 80 ms    Q-T Interval 372 ms    QTC Calculation(Bazett) 447 ms    P Axis 46 degrees    R Axis -4 degrees    T Axis 3 degrees    Diagnosis Line Normal sinus rhythm  Inferior infarct , age undetermined  Nonspecific T wave abnormality  Abnormal ECG    Confirmed by Elisha Moses MD ([id number]) on 4/4/2023 9:20:20 AM (04-03-23 @ 21:27)      MEDICATIONS  acetaminophen   IVPB .. 1000 IV Intermittent once  enoxaparin Injectable 40 SubCutaneous every 24 hours  insulin lispro (ADMELOG) corrective regimen sliding scale  SubCutaneous every 6 hours  lactated ringers. 1000 IV Continuous <Continuous>  pantoprazole  Injectable 40 IV Push every 12 hours  piperacillin/tazobactam IVPB.- 3.375 IV Intermittent once  piperacillin/tazobactam IVPB.. 3.375 IV Intermittent every 8 hours  sodium chloride 0.9% Bolus 250 IV Bolus once  sucralfate suspension 1 Oral every 6 hours      WEIGHT  Weight (kg): 68.9 (04-10-23 @ 12:07)  Creatinine, Serum: 0.6 mg/dL (04-10-23 @ 22:14)      ANTIBIOTICS:  piperacillin/tazobactam IVPB.- 3.375 Gram(s) IV Intermittent once  piperacillin/tazobactam IVPB.. 3.375 Gram(s) IV Intermittent every 8 hours      All available historical records have been reviewed

## 2023-04-11 NOTE — PROGRESS NOTE ADULT - ASSESSMENT
63 y.o F w/ Hx of HTN, HLD, DM, and hx of MD-IPMN s/p pylorus-preserving Whipple (Pancreaticoduodenectomy) on 4/3/2023 (low-grade intestinal type w/ no HGD or malignancy), presenting for pre-syncope and reported episodes of black stools, being evaluated for concern of UGIB.    # Anemia  # Reported Melena  # Recent Pylorus-preserving Whipple Pancreaticoduodenectomy   - patient is hemodynamically stable  - labs reviewed  - CT scan reviewed  - prior EGD/EUS (as above) and pathology reports reviewed     recommendations:  - NPO for now  - IV PPI BID  - Active type and screen  - Will plan for EGD today (risks/benefits/alternatives were discussed)     # Elevated liver enzymes:  likely due to edema at the hepaticojejunostomy vs ? occlusion from a clot in the setting of bleed at the hepaticojejunostomy  TBili 2.4   /      /      /   AlkP 1108   /   Tptn 5.4   /   Alb 3.0    /   DBili 2.1      04-10  TBili 3.4   /      /      /   AlkP 1050   /   Tptn 5.1   /   Alb 3.0    /   DBili 3.1      04-09  TBili 3.3   /      /      /   AlkP 1146   /   Tptn 5.8   /   Alb 3.3    /   DBili 3.0      04-09    recommendations:  - trend LFTs  - get US RUQ w/ doppler  - send viral hepatitis panel for completion (HAV IgG, HBcAb, HBsAb, HBsAg, HCV ab)  - avoid hepatotoxic agents    we will follow closely   63 y.o F w/ Hx of HTN, HLD, DM, and hx of MD-IPMN s/p pylorus-preserving Whipple (Pancreaticoduodenectomy) on 4/3/2023 (low-grade intestinal type w/ no HGD or malignancy), presenting for pre-syncope and reported episodes of black stools, being evaluated for concern of UGIB.    # Anemia: stable   # Reported Melena  # Recent Pylorus-preserving Whipple Pancreaticoduodenectomy   - patient is hemodynamically stable  - labs reviewed  - CT scan reviewed  - prior EGD/EUS (as above) and pathology reports reviewed   - s/p EGD yesterday (please refer to the report in sunrise for full details)     recommendations:  - diet per primary team   - IV PPI BID for 2 more days then switch to PO BID  - Active type and screen  - monitor for signs of GI bleeding     # Elevated liver enzymes: improving   - likely due to edema at the hepaticojejunostomy vs ? occlusion from a clot in the setting of bleed at the hepaticojejunostomy  - labs reviewed     recommendations:  - trend LFTs  - get US RUQ w/ doppler  - send viral hepatitis panel for completion (HAV IgG, HBcAb, HBsAb, HBsAg, HCV ab)  - avoid hepatotoxic agents    we will follow     63 y.o F w/ Hx of HTN, HLD, DM, and hx of MD-IPMN s/p pylorus-preserving Whipple (Pancreaticoduodenectomy) on 4/3/2023 (low-grade intestinal type w/ no HGD or malignancy), presenting for pre-syncope and reported episodes of black stools, initially evaluated for concern of UGIB, being followed.    # Anemia: stable   # Reported Melena  # Recent Pylorus-preserving Whipple Pancreaticoduodenectomy   - patient is hemodynamically stable  - labs reviewed  - CT scan reviewed  - prior EGD/EUS (as above) and pathology reports reviewed   - s/p EGD yesterday revealing a marginal ulcer at the duodenojejunal anastomosis, and multiple small clean based gastric ulcers (please refer to the report in sunrise for full details)     recommendations:  - diet per primary team   - IV PPI BID for 2 more days then switch to PO BID  - Active type and screen  - monitor for signs of GI bleeding   - CBC q24h and transfuse PRN to keep Hb > 7    #abd pain worse at night time   most likely post-operative pain  tolerating PO diet but reports increasing pain at night   conservative management and supportive care for now given overall clinical improvement  -will monitor pain and if persists, may consider repeat imaging    # Elevated liver enzymes: improving   - likely due to edema at the hepaticojejunostomy vs ? occlusion from a clot in the setting of transient bleed at the hepaticojejunostomy  - labs reviewed     recommendations:  - trend LFTs  - get US RUQ w/ doppler  - send viral hepatitis panel for completion (HAV IgG, HBcAb, HBsAb, HBsAg, HCV ab)  - avoid hepatotoxic agents    we will follow

## 2023-04-11 NOTE — PROGRESS NOTE ADULT - SUBJECTIVE AND OBJECTIVE BOX
GENERAL SURGERY PROGRESS NOTE    Patient: JEREMIAS IVEY , 63y ([birth year])Female   MRN: [id number]  Location: [address]  Visit: 04-09-23 Inpatient  Date: 04-11-23 @ 16:43    Procedure/Dx/Injuries: Patient given tylenol x1 for pain, and pain controlled. Denies N/V. Patient started on zosyn and blood cultures taken. Patient on CLD, and tolerating. GI recommending a RUQ ultrasound.     Events of past 24 hours:    PAST MEDICAL & SURGICAL HISTORY:  HTN (hypertension)      High cholesterol      Diabetes      COPD (chronic obstructive pulmonary disease)      Kidney stones      No significant past surgical history          Vitals:   T(F): 98.4 (04-11-23 @ 08:00), Max: 99.5 (04-10-23 @ 17:03)  HR: 84 (04-11-23 @ 14:00)  BP: 112/66 (04-11-23 @ 14:00)  RR: 15 (04-11-23 @ 14:00)  SpO2: 98% (04-11-23 @ 14:00)      Diet, Clear Liquid:   Consistent Carbohydrate No Snacks  DASH/TLC Sodium & Cholesterol Restricted  Supplement Feeding Modality:  Oral  Ensure Clear Cans or Servings Per Day:  3       Frequency:  Daily      Fluids: lactated ringers.: Solution, 1000 milliLiter(s) infuse at 50 mL/Hr  sodium chloride 0.9% Bolus:   250 milliLiter(s), IV Bolus, once, infuse over 5 Minute(s), Stop After 1 Doses  Special Instructions: for tavia  Provider's Contact #: [phone]      I & O's:    04-10-23 @ 07:01  -  04-11-23 @ 07:00  --------------------------------------------------------  IN:    IV PiggyBack: 300 mL    Lactated Ringers: 1320 mL    Sodium Chloride 0.9% Bolus: 250 mL  Total IN: 1870 mL    OUT:    Bulb (mL): 70 mL  Total OUT: 70 mL    Total NET: 1800 mL    PHYSICAL EXAM:  General: NAD  Cardiac: RRR  Respiratory: unlabored breathing at rest  Abdomen: Soft, non-distended, moderate epigastric tenderness, no rebound, no guarding.  Musculoskeletal: FROM in b/l UE and LE  Neuro: Sensation grossly intact and equal throughout, no focal deficits  Skin: Warm/dry, normal color, no jaundice      MEDICATIONS  (STANDING):  enoxaparin Injectable 40 milliGRAM(s) SubCutaneous every 24 hours  insulin lispro (ADMELOG) corrective regimen sliding scale   SubCutaneous every 6 hours  lactated ringers. 1000 milliLiter(s) (50 mL/Hr) IV Continuous <Continuous>  pantoprazole  Injectable 40 milliGRAM(s) IV Push every 12 hours  piperacillin/tazobactam IVPB.. 3.375 Gram(s) IV Intermittent every 8 hours  sodium chloride 0.9% Bolus 250 milliLiter(s) IV Bolus once  sucralfate suspension 1 Gram(s) Oral every 6 hours    MEDICATIONS  (PRN):  HYDROmorphone  Injectable 0.5 milliGRAM(s) IV Push every 6 hours PRN Severe Pain (7 - 10)      DVT PROPHYLAXIS: enoxaparin Injectable 40 milliGRAM(s) SubCutaneous every 24 hours    GI PROPHYLAXIS: pantoprazole  Injectable 40 milliGRAM(s) IV Push every 12 hours    ANTICOAGULATION:   ANTIBIOTICS:  piperacillin/tazobactam IVPB.. 3.375 Gram(s)            LAB/STUDIES:  Labs:  CAPILLARY BLOOD GLUCOSE      POCT Blood Glucose.: 146 mg/dL (11 Apr 2023 10:50)  POCT Blood Glucose.: 145 mg/dL (11 Apr 2023 06:05)  POCT Blood Glucose.: 137 mg/dL (11 Apr 2023 00:06)                          7.2    9.33  )-----------( 274      ( 10 Apr 2023 22:14 )             21.1       Auto Neutrophil %: 78.5 % (04-10-23 @ 22:14)  Auto Immature Granulocyte %: 2.4 % (04-10-23 @ 22:14)    04-10    138  |  107  |  8<L>  ----------------------------<  143<H>  3.7   |  18  |  0.6<L>      Calcium, Total Serum: 8.2 mg/dL (04-10-23 @ 22:14)      LFTs:             x    | x    | x        ------------------[x       ( 11 Apr 2023 11:00 )  x    | x    | x           Lipase:150    Amylase:x             Coags:                        IMAGING:      ACCESS/ DEVICES:  [ ] Peripheral IV  [ ] Central Venous Line	[ ] R	[ ] L	[ ] IJ	[ ] Fem	[ ] SC	Placed:   [ ] Arterial Line		[ ] R	[ ] L	[ ] Fem	[ ] Rad	[ ] Ax	Placed:   [ ] PICC:					[ ] Mediport  [ ] Urinary Catheter,  Date Placed:   [ ] Chest tube: [ ] Right, [ ] Left  [ ] JANICE/Emmett Drains      ASSESSMENT:  63y F w/ PMHx of  ****    PLAN:  -  -  -    Lines/Tubes: PIV, Midline, Central Line, A-Line, Chest tubes, Emmett/JANICE drains, Mcadams Catheter.

## 2023-04-11 NOTE — PROGRESS NOTE ADULT - ASSESSMENT
Assessment & Plan  63y Female w/ hx of IPMN s/p pylorus-preserving Whipple (4/3/23), melanotic stool, tachy, febrile, upgrade for possible cholangitis    NEURO:  #Acute pain    -IV tylenol prn    -Inpatient Rx: HYDROmorphone  Injectable 0.5 milliGRAM(s) IV Push every 6 hours PRN    RESP:   #Oxygen insufficiency     -wean off NC to RA as tolerated    -encourage IS     -AM CXR    CARDS:   #tachycardia    -nicom prn  #HTN    -Rx-losartan 100 daily (held)  #Imaging/Labs    -EKG (4/9): NSR; inferior infarct age indeterminate    GI/NUTR:   #POD #8 s/p Whipple, RLQ JANICE, prevena in place   #Elevated LFTs (downtrending)   -Labs:    -LIVER FUNCTIONS - ( 10 Apr 2023 22:14 )  Alb: 3.1 [3.5 - 5.2] / Pro: 5.5 [6.0 - 8.0] / ALK PHOS: 952 [30 - 115] / ALT: 222 [0 - 41] / AST: 387 [0 - 41] / GGT: x       -LIVER FUNCTIONS - ( 10 Apr 2023 06:10 )  Alb: 3.0 [3.5 - 5.2] / Pro: 5.4 [6.0 - 8.0] / ALK PHOS: 1108 [30 - 115] / ALT: 604 [0 - 41] / AST: 736 [0 - 41] / GGT: x       -LIVER FUNCTIONS - ( 09 Apr 2023 23:00 )  Alb: 3.0 [3.5 - 5.2] / Pro: 5.1 [6.0 - 8.0] / ALK PHOS: 1050 [30 - 115] / ALT: 484 [0 - 41] / AST: 676 [0 - 41] / GGT: x                 -Tbili 3.3>3.4>2.4>1.2     -Dbili 3.4>2.4>1.2  #Melanotic stool    -GI following, s/p EGD, if any more melanotic stool will re-eval the HJ anastomosis     -EDG 4/10: Multiple small (<1 cm) clean based ulcers in the antrum. A marginal, circumferential anastomotic ulcer measuring around 3 cm was seen at the duodeno-jejunotomy with surrounding edema, stigmata of recent bleeding, and visible surgical sutures. There was no evidence of active bleeding. The efferent limb was examined and had no obstruction but had multiple small (< 1  cm) clean based ulcers. The EGD scope was then exchanged to a PCF colonoscope and the afferent limb was examined with bile seen and no evidence of bleeding. The plastic CBD stent was noted to be in position.    -Monitor Hgb    -PPI bid, carafate   #Diet, NPO    -Except Medications With Ice Chips/Sips of Water    -aspiration precautions, HOB 30  #GI Prophylaxis    -PPI BID  #Bowel regimen- held    -last bowel movement  4/9 (melanotic)    /RENAL:   #urine output in critically ill    -voiding    LR @110cc     Labs:          BUN/Cr- 7/0.6  -->,  9/0.6  --> 8/0.6          Electrolytes-Na 138 // K 3.7 // Mg 1.7 //  Phos 2.4  #maintain euvolemia        09 Apr 2023 07:01  -  10 Apr 2023 07:00  --------------------------------------------------------  IN: 1170 mL / OUT: 467.5 mL / NET: 702.5 mL    10 Apr 2023 07:01  -  11 Apr 2023 03:17  --------------------------------------------------------  IN: 1240 mL / OUT: 30 mL / NET: 1210 mL    HEME/ONC:   #DVT prophylaxis    -holding HSQ in setting of melanotic stools, SCDs    Labs: Hb/Hct:  7.5/22.5  -->,  7.2/22.0  -->   7.2/21.1                   Plts:  262  -->,  288  -->   274              PTT/INR:      T&S Expires: 4/12  Blood Consent-obtain if acute anemia, q6 CBC    ID:  #leukocytosis     -WBC- 7.29  --->>,  7.33  --->>,  7.91  --->> 9.33    -Temp trend- 24hrs T(F): 99.5 (10 Apr 2023 23:12), Max: 100.8 (10 Apr 2023 15:38)    -Current antibiotics-piperacillin/tazobactam IVPB. 3.375 once  piperacillin/tazobactam IVPB.- 3.375 once    -ID following     ENDO:    -FSG q6 if NPO or Tube feeds    -Glucose goal 140-180    -if above 180 start ISS     HA1C     MSK:     Activity - Ambulate as Tolerated:     Time/Priority:  Routine (04-09-23 @ 18:46)    LINES/DRAINS:  PIV    ADVANCED DIRECTIVES:  Full Code    HCP/Emergency Contact-    INDICATION FOR SICU: Cholangitis    DISPO: SICU   Assessment & Plan  63y Female w/ hx of IPMN s/p pylorus-preserving Whipple (4/3/23), melanotic stool, tachy, febrile, upgrade for possible cholangitis    NEURO:  #Acute pain    -IV tylenol prn    -Inpatient Rx: HYDROmorphone  Injectable 0.5 milliGRAM(s) IV Push every 6 hours PRN    RESP:   #Oxygen insufficiency     -wean off NC to RA as tolerated    -encourage IS     CARDS:   #tachycardia    -nicom prn  #HTN    -Rx-losartan 100 daily (held)  #Imaging/Labs    -EKG (4/9): NSR; inferior infarct age indeterminate    GI/NUTR:   #POD #8 s/p Whipple, RLQ JANICE, prevena in place   #Elevated LFTs (downtrending)   -Labs:  Aspartate Aminotransferase (AST/SGOT): 222 (04-10-23 @ 22:14)  Alanine Aminotransferase (ALT/SGPT): 387 (04-10-23 @ 22:14)  Bilirubin Direct, Serum: 0.6 (04-10-23 @ 22:14)  Aspartate Aminotransferase (AST/SGOT): 736 (04-10-23 @ 06:10)  Alanine Aminotransferase (ALT/SGPT): 604 (04-10-23 @ 06:10)  Bilirubin Direct, Serum: 2.1 (04-10-23 @ 06:10)  Aspartate Aminotransferase (AST/SGOT): 676 (04-09-23 @ 23:00)  Alanine Aminotransferase (ALT/SGPT): 484 (04-09-23 @ 23:00)  Bilirubin Direct, Serum: 3.1 (04-09-23 @ 23:00)  Aspartate Aminotransferase (AST/SGOT): 618 (04-09-23 @ 14:39)    #Melanotic stool    -GI following, s/p EGD, if any more melanotic stool will re-eval the HJ anastomosis     -EDG 4/10: Multiple small (<1 cm) clean based ulcers in the antrum. A marginal, circumferential anastomotic ulcer measuring around 3 cm was seen at the duodeno-jejunotomy with surrounding edema, stigmata of recent bleeding, and visible surgical sutures. There was no evidence of active bleeding. The efferent limb was examined and had no obstruction but had multiple small (< 1  cm) clean based ulcers. The EGD scope was then exchanged to a PCF colonoscope and the afferent limb was examined with bile seen and no evidence of bleeding. The plastic CBD stent was noted to be in position.    -Monitor Hgb    -PPI bid, carafate   #Diet, CLD    -aspiration precautions, HOB 30  #GI Prophylaxis    -PPI BID  #Bowel regimen- held    -last bowel movement 4/9 (melanotic)    /RENAL:   #urine output in critically ill    -voiding    LR @110cc, IV lock when pt takes sufficient PO intake    Labs:          BUN/Cr- 7/0.6  -->,  9/0.6  --> 8/0.6          Electrolytes-Na 138 // K 3.7 // Mg 1.7 //  Phos 2.4  #maintain euvolemia        09 Apr 2023 07:01  -  10 Apr 2023 07:00  --------------------------------------------------------  IN: 1170 mL / OUT: 467.5 mL / NET: 702.5 mL    10 Apr 2023 07:01  -  11 Apr 2023 03:17  --------------------------------------------------------  IN: 1240 mL / OUT: 30 mL / NET: 1210 mL    HEME/ONC:   #DVT prophylaxis    -lovenox 40mg daily, SCDs    Labs: Hb/Hct:  7.5/22.5  -->,  7.2/22.0  -->   7.2/21.1                   Plts:  262  -->,  288  -->   274              PTT/INR:      T&S Expires: 4/12  Blood Consent-obtain if acute anemia, q6 CBC    ID:  #leukocytosis     -WBC- 7.29  --->>,  7.33  --->>,  7.91  --->> 9.33    -Temp trend- 24hrs T(F): 99.5 (10 Apr 2023 23:12), Max: 100.8 (10 Apr 2023 15:38)    -Current antibiotics-piperacillin/tazobactam IVPB. 3.375 once  piperacillin/tazobactam IVPB.- 3.375 once    -ID following     ENDO:    -FSG q6 if NPO or Tube feeds    -Glucose goal 140-180    -if above 180 start ISS     HA1C     MSK:     Activity - Ambulate as Tolerated:     Time/Priority:  Routine (04-09-23 @ 18:46)    LINES/DRAINS:  PIV    ADVANCED DIRECTIVES:  Full Code    HCP/Emergency Contact-    INDICATION FOR SICU: Cholangitis    DISPO: SICU

## 2023-04-11 NOTE — PROGRESS NOTE ADULT - ASSESSMENT
ASSESSMENT  63y F  POD #8 s/p Pancreaticoduodenectomy on 4/3/2023 and d/c on 4/8, presented for lightheadedness and recent episodes of melanotic stools (last 4/9 in afternoon). CTAP showing expected post-operative changes. Afebrile. ID consulted to continue home ertapenem, As per daughter, patient is not taking Ertapenem (or any other ABx) at home.    IMPRESSION  #Post EGD fever    EDG 4/10: Multiple small (<1 cm) clean based ulcers in the antrum. A marginal, circumferential anastomotic ulcer measuring around 3 cm was seen at the duodeno-jejunotomy with surrounding edema, stigmata of recent bleeding, and visible surgical sutures. There was no evidence of active bleeding. The efferent limb was examined and had no obstruction but had multiple small (< 1 cm) clean based ulcers. The EGD scope was then exchanged to a PCF colonoscope and the afferent limb was examined with bile seen and no evidence of bleeding. The plastic CBD stent was noted to be in position.  #UGIB  #s/p Pancreaticoduodenectomy for pancreatic mass 4/3  #Sepsis rule out on admission    RECOMMENDATIONS  - SEND BCX, pt had fever, none sent  - Zosyn  3.375 Gram(s) IV Intermittent every 8 hours    If any questions, please call or send a message on [employer] Teams  Please continue to update ID with any pertinent new laboratory or radiographic findings  Spectra [id number]

## 2023-04-12 LAB
ALBUMIN SERPL ELPH-MCNC: 2.9 G/DL — LOW (ref 3.5–5.2)
ALP SERPL-CCNC: 653 U/L — HIGH (ref 30–115)
ALT FLD-CCNC: 146 U/L — HIGH (ref 0–41)
ANION GAP SERPL CALC-SCNC: 13 MMOL/L — SIGNIFICANT CHANGE UP (ref 7–14)
AST SERPL-CCNC: 36 U/L — SIGNIFICANT CHANGE UP (ref 0–41)
BASOPHILS # BLD AUTO: 0.02 K/UL — SIGNIFICANT CHANGE UP (ref 0–0.2)
BASOPHILS # BLD AUTO: 0.02 K/UL — SIGNIFICANT CHANGE UP (ref 0–0.2)
BASOPHILS # BLD AUTO: 0.03 K/UL — SIGNIFICANT CHANGE UP (ref 0–0.2)
BASOPHILS NFR BLD AUTO: 0.2 % — SIGNIFICANT CHANGE UP (ref 0–1)
BASOPHILS NFR BLD AUTO: 0.2 % — SIGNIFICANT CHANGE UP (ref 0–1)
BASOPHILS NFR BLD AUTO: 0.3 % — SIGNIFICANT CHANGE UP (ref 0–1)
BILIRUB DIRECT SERPL-MCNC: 0.3 MG/DL — SIGNIFICANT CHANGE UP (ref 0–0.3)
BILIRUB INDIRECT FLD-MCNC: 0.3 MG/DL — SIGNIFICANT CHANGE UP (ref 0.2–1.2)
BILIRUB SERPL-MCNC: 0.6 MG/DL — SIGNIFICANT CHANGE UP (ref 0.2–1.2)
BUN SERPL-MCNC: 5 MG/DL — LOW (ref 10–20)
CALCIUM SERPL-MCNC: 8.4 MG/DL — SIGNIFICANT CHANGE UP (ref 8.4–10.4)
CHLORIDE SERPL-SCNC: 103 MMOL/L — SIGNIFICANT CHANGE UP (ref 98–110)
CO2 SERPL-SCNC: 22 MMOL/L — SIGNIFICANT CHANGE UP (ref 17–32)
CREAT SERPL-MCNC: 0.5 MG/DL — LOW (ref 0.7–1.5)
EGFR: 106 ML/MIN/1.73M2 — SIGNIFICANT CHANGE UP
EOSINOPHIL # BLD AUTO: 0.05 K/UL — SIGNIFICANT CHANGE UP (ref 0–0.7)
EOSINOPHIL # BLD AUTO: 0.07 K/UL — SIGNIFICANT CHANGE UP (ref 0–0.7)
EOSINOPHIL # BLD AUTO: 0.08 K/UL — SIGNIFICANT CHANGE UP (ref 0–0.7)
EOSINOPHIL NFR BLD AUTO: 0.6 % — SIGNIFICANT CHANGE UP (ref 0–8)
EOSINOPHIL NFR BLD AUTO: 0.7 % — SIGNIFICANT CHANGE UP (ref 0–8)
EOSINOPHIL NFR BLD AUTO: 0.8 % — SIGNIFICANT CHANGE UP (ref 0–8)
GLUCOSE BLDC GLUCOMTR-MCNC: 121 MG/DL — HIGH (ref 70–99)
GLUCOSE BLDC GLUCOMTR-MCNC: 126 MG/DL — HIGH (ref 70–99)
GLUCOSE BLDC GLUCOMTR-MCNC: 127 MG/DL — HIGH (ref 70–99)
GLUCOSE BLDC GLUCOMTR-MCNC: 135 MG/DL — HIGH (ref 70–99)
GLUCOSE SERPL-MCNC: 130 MG/DL — HIGH (ref 70–99)
HCT VFR BLD CALC: 20.2 % — LOW (ref 37–47)
HCT VFR BLD CALC: 22.2 % — LOW (ref 37–47)
HCT VFR BLD CALC: 22.6 % — LOW (ref 37–47)
HCT VFR BLD CALC: 24.4 % — LOW (ref 37–47)
HGB BLD-MCNC: 6.7 G/DL — CRITICAL LOW (ref 12–16)
HGB BLD-MCNC: 7.4 G/DL — LOW (ref 12–16)
HGB BLD-MCNC: 7.5 G/DL — LOW (ref 12–16)
HGB BLD-MCNC: 8.1 G/DL — LOW (ref 12–16)
IMM GRANULOCYTES NFR BLD AUTO: 1.6 % — HIGH (ref 0.1–0.3)
IMM GRANULOCYTES NFR BLD AUTO: 2.6 % — HIGH (ref 0.1–0.3)
IMM GRANULOCYTES NFR BLD AUTO: 2.8 % — HIGH (ref 0.1–0.3)
LIDOCAIN IGE QN: 109 U/L — HIGH (ref 7–60)
LYMPHOCYTES # BLD AUTO: 0.99 K/UL — LOW (ref 1.2–3.4)
LYMPHOCYTES # BLD AUTO: 1.08 K/UL — LOW (ref 1.2–3.4)
LYMPHOCYTES # BLD AUTO: 1.35 K/UL — SIGNIFICANT CHANGE UP (ref 1.2–3.4)
LYMPHOCYTES # BLD AUTO: 11 % — LOW (ref 20.5–51.1)
LYMPHOCYTES # BLD AUTO: 11 % — LOW (ref 20.5–51.1)
LYMPHOCYTES # BLD AUTO: 14.2 % — LOW (ref 20.5–51.1)
MAGNESIUM SERPL-MCNC: 1.7 MG/DL — LOW (ref 1.8–2.4)
MCHC RBC-ENTMCNC: 30.2 PG — SIGNIFICANT CHANGE UP (ref 27–31)
MCHC RBC-ENTMCNC: 30.3 PG — SIGNIFICANT CHANGE UP (ref 27–31)
MCHC RBC-ENTMCNC: 30.4 PG — SIGNIFICANT CHANGE UP (ref 27–31)
MCHC RBC-ENTMCNC: 30.5 PG — SIGNIFICANT CHANGE UP (ref 27–31)
MCHC RBC-ENTMCNC: 33.2 G/DL — SIGNIFICANT CHANGE UP (ref 32–37)
MCHC RBC-ENTMCNC: 33.3 G/DL — SIGNIFICANT CHANGE UP (ref 32–37)
MCV RBC AUTO: 91 FL — SIGNIFICANT CHANGE UP (ref 81–99)
MCV RBC AUTO: 91 FL — SIGNIFICANT CHANGE UP (ref 81–99)
MCV RBC AUTO: 91.5 FL — SIGNIFICANT CHANGE UP (ref 81–99)
MCV RBC AUTO: 91.8 FL — SIGNIFICANT CHANGE UP (ref 81–99)
MONOCYTES # BLD AUTO: 0.41 K/UL — SIGNIFICANT CHANGE UP (ref 0.1–0.6)
MONOCYTES # BLD AUTO: 0.41 K/UL — SIGNIFICANT CHANGE UP (ref 0.1–0.6)
MONOCYTES # BLD AUTO: 0.44 K/UL — SIGNIFICANT CHANGE UP (ref 0.1–0.6)
MONOCYTES NFR BLD AUTO: 4.2 % — SIGNIFICANT CHANGE UP (ref 1.7–9.3)
MONOCYTES NFR BLD AUTO: 4.6 % — SIGNIFICANT CHANGE UP (ref 1.7–9.3)
MONOCYTES NFR BLD AUTO: 4.6 % — SIGNIFICANT CHANGE UP (ref 1.7–9.3)
NEUTROPHILS # BLD AUTO: 7.32 K/UL — HIGH (ref 1.4–6.5)
NEUTROPHILS # BLD AUTO: 7.4 K/UL — HIGH (ref 1.4–6.5)
NEUTROPHILS # BLD AUTO: 7.96 K/UL — HIGH (ref 1.4–6.5)
NEUTROPHILS NFR BLD AUTO: 77.3 % — HIGH (ref 42.2–75.2)
NEUTROPHILS NFR BLD AUTO: 81.3 % — HIGH (ref 42.2–75.2)
NEUTROPHILS NFR BLD AUTO: 82 % — HIGH (ref 42.2–75.2)
NRBC # BLD: 0 /100 WBCS — SIGNIFICANT CHANGE UP (ref 0–0)
PHOSPHATE SERPL-MCNC: 3.7 MG/DL — SIGNIFICANT CHANGE UP (ref 2.1–4.9)
PLATELET # BLD AUTO: 289 K/UL — SIGNIFICANT CHANGE UP (ref 130–400)
PLATELET # BLD AUTO: 310 K/UL — SIGNIFICANT CHANGE UP (ref 130–400)
PLATELET # BLD AUTO: 321 K/UL — SIGNIFICANT CHANGE UP (ref 130–400)
PLATELET # BLD AUTO: 334 K/UL — SIGNIFICANT CHANGE UP (ref 130–400)
PMV BLD: 9.3 FL — SIGNIFICANT CHANGE UP (ref 7.4–10.4)
PMV BLD: 9.4 FL — SIGNIFICANT CHANGE UP (ref 7.4–10.4)
PMV BLD: 9.5 FL — SIGNIFICANT CHANGE UP (ref 7.4–10.4)
PMV BLD: 9.7 FL — SIGNIFICANT CHANGE UP (ref 7.4–10.4)
POTASSIUM SERPL-MCNC: 4.1 MMOL/L — SIGNIFICANT CHANGE UP (ref 3.5–5)
POTASSIUM SERPL-SCNC: 4.1 MMOL/L — SIGNIFICANT CHANGE UP (ref 3.5–5)
PROT SERPL-MCNC: 5.4 G/DL — LOW (ref 6–8)
RBC # BLD: 2.2 M/UL — LOW (ref 4.2–5.4)
RBC # BLD: 2.44 M/UL — LOW (ref 4.2–5.4)
RBC # BLD: 2.47 M/UL — LOW (ref 4.2–5.4)
RBC # BLD: 2.68 M/UL — LOW (ref 4.2–5.4)
RBC # FLD: 12.4 % — SIGNIFICANT CHANGE UP (ref 11.5–14.5)
RBC # FLD: 13.2 % — SIGNIFICANT CHANGE UP (ref 11.5–14.5)
RBC # FLD: 13.4 % — SIGNIFICANT CHANGE UP (ref 11.5–14.5)
RBC # FLD: 13.5 % — SIGNIFICANT CHANGE UP (ref 11.5–14.5)
SODIUM SERPL-SCNC: 138 MMOL/L — SIGNIFICANT CHANGE UP (ref 135–146)
WBC # BLD: 9.01 K/UL — SIGNIFICANT CHANGE UP (ref 4.8–10.8)
WBC # BLD: 9.49 K/UL — SIGNIFICANT CHANGE UP (ref 4.8–10.8)
WBC # BLD: 9.62 K/UL — SIGNIFICANT CHANGE UP (ref 4.8–10.8)
WBC # BLD: 9.79 K/UL — SIGNIFICANT CHANGE UP (ref 4.8–10.8)
WBC # FLD AUTO: 9.01 K/UL — SIGNIFICANT CHANGE UP (ref 4.8–10.8)
WBC # FLD AUTO: 9.49 K/UL — SIGNIFICANT CHANGE UP (ref 4.8–10.8)
WBC # FLD AUTO: 9.62 K/UL — SIGNIFICANT CHANGE UP (ref 4.8–10.8)
WBC # FLD AUTO: 9.79 K/UL — SIGNIFICANT CHANGE UP (ref 4.8–10.8)

## 2023-04-12 PROCEDURE — 99233 SBSQ HOSP IP/OBS HIGH 50: CPT | Mod: 24

## 2023-04-12 PROCEDURE — 99233 SBSQ HOSP IP/OBS HIGH 50: CPT

## 2023-04-12 PROCEDURE — 93010 ELECTROCARDIOGRAM REPORT: CPT

## 2023-04-12 RX ORDER — ACETAMINOPHEN 500 MG
650 TABLET ORAL EVERY 6 HOURS
Refills: 0 | Status: DISCONTINUED | OUTPATIENT
Start: 2023-04-12 | End: 2023-04-14

## 2023-04-12 RX ORDER — ACETAMINOPHEN 500 MG
1000 TABLET ORAL ONCE
Refills: 0 | Status: COMPLETED | OUTPATIENT
Start: 2023-04-12 | End: 2023-04-12

## 2023-04-12 RX ORDER — MAGNESIUM SULFATE 500 MG/ML
2 VIAL (ML) INJECTION ONCE
Refills: 0 | Status: DISCONTINUED | OUTPATIENT
Start: 2023-04-12 | End: 2023-04-12

## 2023-04-12 RX ORDER — LOSARTAN POTASSIUM 100 MG/1
100 TABLET, FILM COATED ORAL DAILY
Refills: 0 | Status: DISCONTINUED | OUTPATIENT
Start: 2023-04-12 | End: 2023-04-17

## 2023-04-12 RX ORDER — POTASSIUM CHLORIDE 20 MEQ
20 PACKET (EA) ORAL
Refills: 0 | Status: DISCONTINUED | OUTPATIENT
Start: 2023-04-12 | End: 2023-04-12

## 2023-04-12 RX ORDER — MAGNESIUM SULFATE 500 MG/ML
2 VIAL (ML) INJECTION ONCE
Refills: 0 | Status: COMPLETED | OUTPATIENT
Start: 2023-04-12 | End: 2023-04-12

## 2023-04-12 RX ADMIN — Medication 1 GRAM(S): at 12:38

## 2023-04-12 RX ADMIN — PIPERACILLIN AND TAZOBACTAM 25 GRAM(S): 4; .5 INJECTION, POWDER, LYOPHILIZED, FOR SOLUTION INTRAVENOUS at 06:02

## 2023-04-12 RX ADMIN — Medication 650 MILLIGRAM(S): at 23:42

## 2023-04-12 RX ADMIN — Medication 1 GRAM(S): at 23:42

## 2023-04-12 RX ADMIN — Medication 1000 MILLIGRAM(S): at 09:13

## 2023-04-12 RX ADMIN — HYDROMORPHONE HYDROCHLORIDE 0.5 MILLIGRAM(S): 2 INJECTION INTRAMUSCULAR; INTRAVENOUS; SUBCUTANEOUS at 06:05

## 2023-04-12 RX ADMIN — Medication 1 GRAM(S): at 06:02

## 2023-04-12 RX ADMIN — HYDROMORPHONE HYDROCHLORIDE 0.5 MILLIGRAM(S): 2 INJECTION INTRAMUSCULAR; INTRAVENOUS; SUBCUTANEOUS at 19:51

## 2023-04-12 RX ADMIN — Medication 400 MILLIGRAM(S): at 08:58

## 2023-04-12 RX ADMIN — PIPERACILLIN AND TAZOBACTAM 25 GRAM(S): 4; .5 INJECTION, POWDER, LYOPHILIZED, FOR SOLUTION INTRAVENOUS at 21:40

## 2023-04-12 RX ADMIN — Medication 25 GRAM(S): at 23:42

## 2023-04-12 RX ADMIN — ENOXAPARIN SODIUM 40 MILLIGRAM(S): 100 INJECTION SUBCUTANEOUS at 06:04

## 2023-04-12 RX ADMIN — Medication 50 MILLIEQUIVALENT(S): at 06:02

## 2023-04-12 RX ADMIN — PANTOPRAZOLE SODIUM 40 MILLIGRAM(S): 20 TABLET, DELAYED RELEASE ORAL at 17:46

## 2023-04-12 RX ADMIN — HYDROMORPHONE HYDROCHLORIDE 0.5 MILLIGRAM(S): 2 INJECTION INTRAMUSCULAR; INTRAVENOUS; SUBCUTANEOUS at 06:35

## 2023-04-12 RX ADMIN — Medication 650 MILLIGRAM(S): at 23:50

## 2023-04-12 RX ADMIN — HYDROMORPHONE HYDROCHLORIDE 0.5 MILLIGRAM(S): 2 INJECTION INTRAMUSCULAR; INTRAVENOUS; SUBCUTANEOUS at 21:11

## 2023-04-12 RX ADMIN — PANTOPRAZOLE SODIUM 40 MILLIGRAM(S): 20 TABLET, DELAYED RELEASE ORAL at 06:01

## 2023-04-12 RX ADMIN — Medication 1000 MILLIGRAM(S): at 01:22

## 2023-04-12 RX ADMIN — LOSARTAN POTASSIUM 100 MILLIGRAM(S): 100 TABLET, FILM COATED ORAL at 12:38

## 2023-04-12 RX ADMIN — PIPERACILLIN AND TAZOBACTAM 25 GRAM(S): 4; .5 INJECTION, POWDER, LYOPHILIZED, FOR SOLUTION INTRAVENOUS at 14:10

## 2023-04-12 RX ADMIN — Medication 400 MILLIGRAM(S): at 00:52

## 2023-04-12 RX ADMIN — Medication 1 GRAM(S): at 17:46

## 2023-04-12 NOTE — PROGRESS NOTE ADULT - SUBJECTIVE AND OBJECTIVE BOX
JEREMIAS IVEY     63y Female    [id number]    Admit Date: 04-09-23  ICU Admission Date: 4/10/23        ============================  HPI   63y F  POD #8 s/p Pancreaticoduodenectomy on 4/3/2023 and d/c on 4/8, presented for lightheadedness and recent episodes of melanotic stools (last 4/9 in afternoon). Denies fevers/chills, chest pain, shortness of breath, syncope. She states that before admission she had an episode of nausea and retching without vomiting and increasing abdominal pain.  In ED, Hgb remains at 7.2, LFTs elevated Tbili 3.3 now 1.2, Alk phos 1146, now 952. CT was unremarkable, GI c/s, EGD done 4/10 to evaluate the GJ anastomosis no active bleeding noted, old blood noted, negative for obstruction. Pt was tachycardic on the floor 110s, tmax 100.8, bolused 500cc LR, started IVF, zosyn given c/f cholangitis.   PMH  PAST MEDICAL & SURGICAL HISTORY:  HTN (hypertension)  High cholesterol  Diabetes  COPD (chronic obstructive pulmonary disease)  Kidney stones  No significant past surgical history    Home Meds:  Home Medications:  acetaminophen 325 mg oral tablet: 2 tab(s) orally every 6 hours as needed for  pain (08 Apr 2023 10:59)  Benicar: orally once a day (03 Apr 2023 06:33)  Lipitor: orally once a day (03 Apr 2023 06:33)  metFORMIN:  (03 Apr 2023 06:33)    Allergies  No Known Allergies     24 Hour Events  4/11   NIGHT  -Hgb dec 6.3 from 7.2. STAT repeat CBC drawn, 1unit ordered (will wait for confirmation), blood transfusion consent done, daughter notified   -Patient mentating well, normotensive, not tachycardic, JANICE output 10cc serosang, ecchymosis noted RLQ above drain placement  -rebeat hgb 6.7. will give 1 uPRBc  -f/u am CBC    DAY  -f/u advancing diet--> clears + ensures; IVL once tolerating diet   -zosyn duration dependent on Bcx (2 sets) results, sent today  -ambulate  -f/u amylase and lipase   -IV tylenol x 1 for pain  - lipase 150  -ok to dg giuliana per Dr. Blanco     [X] A ten-point review of systems was otherwise negative except as noted above.  [  ] Due to altered mental status/intubation, subjective information was not attained from the patient. History was obtained, to the extent possible, from review of the chart and collateral sources of information.    =========================================================================================================================================     JEREMIAS IVEY     63y Female    MRN-[id number]    Admit Date: 04-09-23  ICU Admission Date: 4/10/23        ============================  HPI   63y F s/p Pancreaticoduodenectomy on 4/3/2023 and d/c on 4/8, presented for lightheadedness and recent episodes of melanotic stools (last 4/9 in afternoon). Denies fevers/chills, chest pain, shortness of breath, syncope. She states that before admission she had an episode of nausea and retching without vomiting and increasing abdominal pain.  In ED, Hgb remains at 7.2, LFTs elevated Tbili 3.3 now 1.2, Alk phos 1146, now 952. CT was unremarkable, GI c/s, EGD done 4/10 to evaluate the GJ anastomosis no active bleeding noted, old blood noted, negative for obstruction. Pt was tachycardic on the floor 110s, tmax 100.8, bolused 500cc LR, started IVF, zosyn given c/f cholangitis.   PMH  PAST MEDICAL & SURGICAL HISTORY:  HTN (hypertension)  High cholesterol  Diabetes  COPD (chronic obstructive pulmonary disease)  Kidney stones  No significant past surgical history    Home Meds:  Home Medications:  acetaminophen 325 mg oral tablet: 2 tab(s) orally every 6 hours as needed for  pain (08 Apr 2023 10:59)  Benicar: orally once a day (03 Apr 2023 06:33)  Lipitor: orally once a day (03 Apr 2023 06:33)  metFORMIN:  (03 Apr 2023 06:33)    Allergies  No Known Allergies     24 Hour Events  4/11   NIGHT  -Hgb dec 6.3 from 7.2. STAT repeat CBC drawn, 1unit ordered (will wait for confirmation), blood transfusion consent done, daughter notified   -Patient mentating well, normotensive, not tachycardic, JANICE output 10cc serosang, ecchymosis noted RLQ above drain placement  -rebeat hgb 6.7. will give 1 uPRBc  -f/u am CBC    DAY  -f/u advancing diet--> clears + ensures; IVL once tolerating diet   -zosyn duration dependent on Bcx (2 sets) results, sent today  -ambulate  -f/u amylase and lipase   -IV tylenol x 1 for pain  - lipase 150  -ok to dg giuliana per Dr. Blanco     [X] A ten-point review of systems was otherwise negative except as noted above.  [  ] Due to altered mental status/intubation, subjective information was not attained from the patient. History was obtained, to the extent possible, from review of the chart and collateral sources of information.    =========================================================================================================================================

## 2023-04-12 NOTE — DIETITIAN INITIAL EVALUATION ADULT - PERTINENT LABORATORY DATA
04-11    134<L>  |  101  |  6<L>  ----------------------------<  110<H>  3.5   |  23  |  0.5<L>    Ca    7.9<L>      11 Apr 2023 21:22  Phos  2.9     04-11  Mg     1.9     04-11    TPro  5.1<L>  /  Alb  2.9<L>  /  TBili  0.9  /  DBili  0.4<H>  /  AST  62<H>  /  ALT  217<H>  /  AlkPhos  689<H>  04-11    CAPILLARY BLOOD GLUCOSE  POCT Blood Glucose.: 121 mg/dL (12 Apr 2023 12:34)  POCT Blood Glucose.: 126 mg/dL (12 Apr 2023 05:59)  POCT Blood Glucose.: 131 mg/dL (11 Apr 2023 23:14)  POCT Blood Glucose.: 151 mg/dL (11 Apr 2023 17:54)    A1C with Estimated Average Glucose Result: 7.1 % (03-13-23 @ 10:56)

## 2023-04-12 NOTE — DIETITIAN INITIAL EVALUATION ADULT - NSFNSGIIOFT_GEN_A_CORE
Weight above using UBW -- pt unlikely had weight gain after having poor kcal intake x7 days.    I&O's Detail    11 Apr 2023 07:01  -  12 Apr 2023 07:00  --------------------------------------------------------  IN:    IV PiggyBack: 650 mL    Lactated Ringers: 800 mL    Lactated Ringers: 880 mL    PRBCs (Packed Red Blood Cells): 300 mL  Total IN: 2630 mL    OUT:    Bulb (mL): 60 mL    Voided (mL): 1300 mL  Total OUT: 1360 mL    Total NET: 1270 mL

## 2023-04-12 NOTE — DIETITIAN INITIAL EVALUATION ADULT - OTHER INFO
Pt presented for lightheadedness and recent episodes of melanotic stools, currently being treated for possible cholangitis.

## 2023-04-12 NOTE — DIETITIAN INITIAL EVALUATION ADULT - PERTINENT MEDS FT
MEDICATIONS  (STANDING):  enoxaparin Injectable 40 milliGRAM(s) SubCutaneous every 24 hours  insulin lispro (ADMELOG) corrective regimen sliding scale   SubCutaneous every 6 hours  lactated ringers. 1000 milliLiter(s) (50 mL/Hr) IV Continuous <Continuous>  losartan 100 milliGRAM(s) Oral daily  magnesium sulfate  IVPB 2 Gram(s) IV Intermittent once  pantoprazole  Injectable 40 milliGRAM(s) IV Push every 12 hours  piperacillin/tazobactam IVPB.. 3.375 Gram(s) IV Intermittent every 8 hours  potassium chloride  20 mEq/100 mL IVPB 20 milliEquivalent(s) IV Intermittent every 2 hours  sucralfate suspension 1 Gram(s) Oral every 6 hours    MEDICATIONS  (PRN):  HYDROmorphone  Injectable 0.5 milliGRAM(s) IV Push every 6 hours PRN Severe Pain (7 - 10)

## 2023-04-12 NOTE — DIETITIAN INITIAL EVALUATION ADULT - ENERGY INTAKE
Reports drinking ~8oz fluids at each meal. Appetite and PO intake limited by pain and nausea after eating.

## 2023-04-12 NOTE — DIETITIAN INITIAL EVALUATION ADULT - NUTRITIONGOAL OUTCOME1
Pt to meet >50% estimated needs in 3-5 days. RD to follow as per high risk protocol.  Monitor diet order, kcal intake, body composition, NFPE, labs  (lytes, BG, renal indices)

## 2023-04-12 NOTE — DIETITIAN INITIAL EVALUATION ADULT - NS FNS DIET ORDER
Diet, Clear Liquid:   Consistent Carbohydrate {No Snacks}  DASH/TLC {Sodium & Cholesterol Restricted}  Supplement Feeding Modality:  Oral  Ensure Clear Cans or Servings Per Day:  3       Frequency:  Daily (04-11-23 @ 08:30) [Active]

## 2023-04-12 NOTE — DIETITIAN INITIAL EVALUATION ADULT - PERSON TAUGHT/METHOD
encouraged PO intake, reviewed importance of kcal intake to support weight maintenance, ensure intake of prosource gelatein supplements to aid protein intake./verbal instruction/patient instructed

## 2023-04-12 NOTE — PROGRESS NOTE ADULT - ASSESSMENT
63yFemale with hx of Pancreatic head IPMN, s/p pancreaticoduodenectomy, presenting with Postoperative pain, and melanotic stools. She is s/p EGD with findings consistent for previous bleeding, afferent and efferent limbs of the GJ patent.     Plan:  -LFTs downtrending, no further evidence of fevers, WBC downtrending; less likely concern for cholangitis   -Hgb dropped overnight <7 x2, received 1 unit of PRBCs, hgb increased to 8.1; continue to trend CBC, vitals WNL, monitor for melanotic stool   -Per ID, continue zosyn empirically while admitted, if discharged before 7 day course is complete, d/c on PO levaquin and flagyl   -Continue CLD, defer advancing until pain resolves   -Strict I&Os, monitor left drain output and character   -Adequate pain control  -Encourage ambulation and out of bed to chair  -Q1 vitals   -Patient and plan discussed with Dr. Blanco

## 2023-04-12 NOTE — PROGRESS NOTE ADULT - SUBJECTIVE AND OBJECTIVE BOX
GENERAL SURGERY PROGRESS NOTE     JEREMIAS IVEY  63y  Female  Hospital day: 4  POD: 9  Procedure: S/p whipple    OVERNIGHT EVENTS: Reports tenderness in the epigastric region, vitals WNL. Denies nausea or vomiting, passing gas, melanotic BM x1.     T(F): 97.8 (04-12-23 @ 16:00), Max: 98.6 (04-11-23 @ 20:00)  HR: 80 (04-12-23 @ 16:00) (72 - 100)  BP: 137/73 (04-12-23 @ 16:00) (116/69 - 146/73)  RR: 17 (04-12-23 @ 16:00) (12 - 26)  SpO2: 98% (04-12-23 @ 16:00) (94% - 100%)    DIET/FLUIDS: lactated ringers. 1000 milliLiter(s) IV Continuous <Continuous>  magnesium sulfate  IVPB 2 Gram(s) IV Intermittent once  potassium chloride  20 mEq/100 mL IVPB 20 milliEquivalent(s) IV Intermittent every 2 hours                                                                    DRAINS:   04-11-23 @ 07:01  -  04-12-23 @ 07:00  --------------------------------------------------------  OUT: 60 mL     GI proph:  pantoprazole  Injectable 40 milliGRAM(s) IV Push every 12 hours  AC/ proph: enoxaparin Injectable 40 milliGRAM(s) SubCutaneous every 24 hours  ABx: piperacillin/tazobactam IVPB.. 3.375 Gram(s) IV Intermittent every 8 hours    PHYSICAL EXAM:  GENERAL: NAD  CHEST/LUNG: Clear to auscultation bilaterally  HEART: Regular rate and rhythm  ABDOMEN: Soft, nondistended, tender in the epigastrium without rebound or guarding. Transverse abdominal incision intact, prevena removed. Left JANICE in place with serosanguinous output.   EXTREMITIES:  No clubbing, cyanosis, or edema    LABS    POCT Blood Glucose.: 121 mg/dL (12 Apr 2023 12:34)  POCT Blood Glucose.: 126 mg/dL (12 Apr 2023 05:59)  POCT Blood Glucose.: 131 mg/dL (11 Apr 2023 23:14)  POCT Blood Glucose.: 151 mg/dL (11 Apr 2023 17:54)                        7.5    9.01  )-----------( 289      ( 12 Apr 2023 11:22 )             22.6       Auto Neutrophil %: 82.0 % (04-12-23 @ 11:22)  Auto Immature Granulocyte %: 1.6 % (04-12-23 @ 11:22)  Auto Neutrophil %: 77.3 % (04-12-23 @ 05:54)  Auto Immature Granulocyte %: 2.8 % (04-12-23 @ 05:54)  Auto Neutrophil %: 81.3 % (04-12-23 @ 00:25)  Auto Immature Granulocyte %: 2.6 % (04-12-23 @ 00:25)  Auto Neutrophil %: 79.6 % (04-11-23 @ 21:22)  Auto Immature Granulocyte %: 3.1 % (04-11-23 @ 21:22)    04-11    134<L>  |  101  |  6<L>  ----------------------------<  110<H>  3.5   |  23  |  0.5<L>    Calcium, Total Serum: 7.9 mg/dL (04-11-23 @ 21:22)    LFTs:             5.1  | 0.9  | 62       ------------------[689     ( 11 Apr 2023 21:22 )  2.9  | 0.4  | 217         Lipase:162    Amylase:61       RADIOLOGY & ADDITIONAL TESTS:  < from: Xray Chest 1 View- PORTABLE-Routine (Xray Chest 1 View- PORTABLE-Routine in AM.) (04.11.23 @ 06:04) >  Impression:  Low lung volumes. Stable basilar atelectasis. No pneumothorax

## 2023-04-12 NOTE — PROGRESS NOTE ADULT - ASSESSMENT
ASSESSMENT  63y F  POD #8 s/p Pancreaticoduodenectomy on 4/3/2023 and d/c on 4/8, presented for lightheadedness and recent episodes of melanotic stools (last 4/9 in afternoon). CTAP showing expected post-operative changes. Afebrile. ID consulted to continue home ertapenem, As per daughter, patient is not taking Ertapenem (or any other ABx) at home.    IMPRESSION  #Post EGD fever    EDG 4/10: Multiple small (<1 cm) clean based ulcers in the antrum. A marginal, circumferential anastomotic ulcer measuring around 3 cm was seen at the duodeno-jejunotomy with surrounding edema, stigmata of recent bleeding, and visible surgical sutures. There was no evidence of active bleeding. The efferent limb was examined and had no obstruction but had multiple small (< 1 cm) clean based ulcers. The EGD scope was then exchanged to a PCF colonoscope and the afferent limb was examined with bile seen and no evidence of bleeding. The plastic CBD stent was noted to be in position.  #UGIB  #s/p Pancreaticoduodenectomy for pancreatic mass 4/3  #Sepsis rule out on admission  Creatinine, Serum: 0.5 mg/dL (04.11.23 @ 21:22)      RECOMMENDATIONS  - f/u BCX  - Zosyn  3.375 Gram(s) IV Intermittent every 8 hours, likely empiric 7 days if BCX NG, on D/C PO levaquin 500mg daily + flagyl PO 500mg TID    If any questions, please call or send a message on [employer] Teams  Please continue to update ID with any pertinent new laboratory or radiographic findings  Spectra [phone]

## 2023-04-12 NOTE — PROGRESS NOTE ADULT - SUBJECTIVE AND OBJECTIVE BOX
JEREMIAS IVEY  63y, Female  Allergy: No Known Allergies      LOS  3d    CHIEF COMPLAINT: S/P whipple with melena (12 Apr 2023 01:09)      INTERVAL EVENTS/HPI  - No acute events overnight  - T(F): , Max: 98.6 (04-11-23 @ 20:00) Afebrile   - Tolerating medication  - WBC Count: 9.49 (04-12-23 @ 05:54)  WBC Count: 9.79 (04-12-23 @ 00:25)     - Creatinine, Serum: 0.5 (04-11-23 @ 21:22)  Creatinine, Serum: 0.6 (04-10-23 @ 22:14)       ROS  ***    VITALS:  T(F): 97.7, Max: 98.6 (04-11-23 @ 20:00)  HR: 80  BP: 140/62  RR: 19Vital Signs Last 24 Hrs  T(C): 36.5 (12 Apr 2023 08:00), Max: 37 (11 Apr 2023 20:00)  T(F): 97.7 (12 Apr 2023 08:00), Max: 98.6 (11 Apr 2023 20:00)  HR: 80 (12 Apr 2023 08:00) (72 - 100)  BP: 140/62 (12 Apr 2023 08:00) (107/65 - 140/62)  BP(mean): 89 (12 Apr 2023 08:00) (82 - 98)  RR: 19 (12 Apr 2023 08:00) (14 - 45)  SpO2: 99% (12 Apr 2023 08:00) (94% - 100%)    Parameters below as of 12 Apr 2023 08:00  Patient On (Oxygen Delivery Method): nasal cannula  O2 Flow (L/min): 2      PHYSICAL EXAM:  ***    FH: Non-contributory  Social Hx: Non-contributory    TESTS & MEASUREMENTS:                        8.1    9.49  )-----------( 310      ( 12 Apr 2023 05:54 )             24.4     04-11    134<L>  |  101  |  6<L>  ----------------------------<  110<H>  3.5   |  23  |  0.5<L>    Ca    7.9<L>      11 Apr 2023 21:22  Phos  2.9     04-11  Mg     1.9     04-11    TPro  5.1<L>  /  Alb  2.9<L>  /  TBili  0.9  /  DBili  0.4<H>  /  AST  62<H>  /  ALT  217<H>  /  AlkPhos  689<H>  04-11      LIVER FUNCTIONS - ( 11 Apr 2023 21:22 )  Alb: 2.9 g/dL / Pro: 5.1 g/dL / ALK PHOS: 689 U/L / ALT: 217 U/L / AST: 62 U/L / GGT: x               Culture - Body Fluid with Gram Stain (collected 04-03-23 @ 12:40)  Source: .Body Fluid None  Gram Stain (04-04-23 @ 05:15):    No polymorphonuclear cells seen    No organisms seen    by cytocentrifuge  Final Report (04-09-23 @ 09:22):    No growth at 5 days            INFECTIOUS DISEASES TESTING  COVID-19 PCR: NotDetec (04-09-23 @ 15:07)  strept    INFLAMMATORY MARKERS      RADIOLOGY & ADDITIONAL TESTS:  I have personally reviewed the last available Chest xray  CXR      CT  CT Abdomen and Pelvis w/ Oral Cont and w/ IV Cont:   ACC: [id number] EXAM:  CT ABDOMEN AND PELVIS OC IC   ORDERED BY: TAYLER CORNELL     PROCEDURE DATE:  04/09/2023          INTERPRETATION:  CLINICAL STATEMENT: Gastrointestinal bleed. Pancreatic   mass. Pancreatic head mass status post pylorus-sparing   pancreaticoduodenectomy on 4/3/2023    TECHNIQUE: Contiguous axial CT images were obtained from the lower chest   to the pubic symphysis following administration of 100cc Omnipaque 350   intravenous contrast.  Oral contrast was administered.  Reformatted   images in the coronal and sagittal planes were acquired.    COMPARISON: CT abdomen and pelvis 2/16/2023.      FINDINGS:    LOWER CHEST: Trace left pleural effusion. Bilateral lower lobe   subsegmental atelectasis.    HEPATOBILIARY/PANCREAS: Post pylorus-sparing pancreaticoduodenectomy with   placement of a choledochojejunostomy stent and pancreaticojejunostomy   stent. Right upper quadrant JANICE drain. Moderate amount of fat stranding   and small amount of free fluid in the right upper quadrant, likely   postsurgical.    Hepatic steatosis. No significant dilation of the intrahepatic bile   ducts. Unchanged subcentimeter hypodensity in the right hepatic dome too   small to characterize.    SPLEEN: Unremarkable.    ADRENAL GLANDS: Unremarkable.    KIDNEYS: Symmetric renal enhancement. No hydronephrosis.    ABDOMINOPELVIC NODES: Unremarkable.    PELVIC ORGANS: Foci of air in the bladder, likely from prior   instrumentation.    PERITONEUM/MESENTERY/BOWEL: Oral contrast is seen up tothe ileum. No   evidence of bowel obstruction or pneumoperitoneum. Small amount of pelvic   free fluid. Normal caliber appendix.    BONES/SOFT TISSUES: Soft tissue edema of the right flank abdominal wall   muscular/fascial layer. No evidence of acutedrainable collection.   Degenerative changes of the spine.    OTHER: Mild scattered atherosclerotic plaque.      IMPRESSION:    No evidence of acute abdominal pathology.    Post pancreaticoduodenectomy with expected recent postoperative change.    --- End of Report ---          QUIN FRITZ MD; Resident Radiologist  This document has been electronically signed.  LEXX LAINEZ MD; Attending Radiologist  This document has been electronically signed. Apr 9 2023 11:58PM (04-09-23 @ 16:43)      CARDIOLOGY TESTING  12 Lead ECG:   Ventricular Rate 98 BPM    Atrial Rate 98 BPM    P-R Interval 144 ms    QRS Duration 78 ms    Q-T Interval 348 ms    QTC Calculation(Bazett) 444 ms    P Axis 37 degrees    R Axis 0 degrees    T Axis 1 degrees    Diagnosis Line Normal sinus rhythm  Inferior infarct , age undetermined  Abnormal ECG    Confirmed by Elisha Moses MD ([id number]) on 4/10/2023 8:29:51 AM (04-09-23 @ 17:46)  12 Lead ECG:   Ventricular Rate 87 BPM    Atrial Rate 87 BPM    P-R Interval 164 ms    QRS Duration 80 ms    Q-T Interval 372 ms    QTC Calculation(Bazett) 447 ms    P Axis 46 degrees    R Axis -4 degrees    T Axis 3 degrees    Diagnosis Line Normal sinus rhythm  Inferior infarct , age undetermined  Nonspecific T wave abnormality  Abnormal ECG    Confirmed by Elisha Moses MD ([id number]) on 4/4/2023 9:20:20 AM (04-03-23 @ 21:27)      MEDICATIONS  enoxaparin Injectable 40 SubCutaneous every 24 hours  insulin lispro (ADMELOG) corrective regimen sliding scale  SubCutaneous every 6 hours  lactated ringers. 1000 IV Continuous <Continuous>  losartan 100 Oral daily  magnesium sulfate  IVPB 2 IV Intermittent once  pantoprazole  Injectable 40 IV Push every 12 hours  piperacillin/tazobactam IVPB.. 3.375 IV Intermittent every 8 hours  potassium chloride  20 mEq/100 mL IVPB 20 IV Intermittent every 2 hours  sucralfate suspension 1 Oral every 6 hours      WEIGHT  Weight (kg): 68.9 (04-10-23 @ 12:07)  Creatinine, Serum: 0.5 mg/dL (04-11-23 @ 21:22)      ANTIBIOTICS:  piperacillin/tazobactam IVPB.. 3.375 Gram(s) IV Intermittent every 8 hours      All available historical records have been reviewed       JEREMIAS IVEY  63y, Female  Allergy: No Known Allergies      LOS  3d    CHIEF COMPLAINT: S/P whipple with melena (12 Apr 2023 01:09)      INTERVAL EVENTS/HPI  - No acute events overnight  - T(F): , Max: 98.6 (04-11-23 @ 20:00) Afebrile   - Tolerating medication  - WBC Count: 9.49 (04-12-23 @ 05:54)  WBC Count: 9.79 (04-12-23 @ 00:25)     - Creatinine, Serum: 0.5 (04-11-23 @ 21:22)  Creatinine, Serum: 0.6 (04-10-23 @ 22:14)       ROS  General: Denies rigors, nightsweats  HEENT: Denies headache, rhinorrhea, sore throat, eye pain  CV: Denies CP, palpitations  PULM: Denies wheezing, hemoptysis  GI: Denies hematemesis, hematochezia, melena  : Denies discharge, hematuria  MSK: Denies arthralgias, myalgias  SKIN: Denies rash, lesions  NEURO: Denies paresthesias, weakness  PSYCH: Denies depression, anxiety     VITALS:  T(F): 97.7, Max: 98.6 (04-11-23 @ 20:00)  HR: 80  BP: 140/62  RR: 19Vital Signs Last 24 Hrs  T(C): 36.5 (12 Apr 2023 08:00), Max: 37 (11 Apr 2023 20:00)  T(F): 97.7 (12 Apr 2023 08:00), Max: 98.6 (11 Apr 2023 20:00)  HR: 80 (12 Apr 2023 08:00) (72 - 100)  BP: 140/62 (12 Apr 2023 08:00) (107/65 - 140/62)  BP(mean): 89 (12 Apr 2023 08:00) (82 - 98)  RR: 19 (12 Apr 2023 08:00) (14 - 45)  SpO2: 99% (12 Apr 2023 08:00) (94% - 100%)    Parameters below as of 12 Apr 2023 08:00  Patient On (Oxygen Delivery Method): nasal cannula  O2 Flow (L/min): 2      PHYSICAL EXAM:  Gen: NAD, resting in bed  HEENT: Normocephalic, atraumatic  Neck: supple, no lymphadenopathy  CV: Regular rate & regular rhythm  Lungs: decreased BS at bases, no fremitus  Abdomen: Soft, BS present JANICE serosanguinous fluid   Ext: Warm, well perfused  Neuro: non focal, awake  Skin: no rash, no erythema  Lines: no phlebitis     FH: Non-contributory  Social Hx: Non-contributory    TESTS & MEASUREMENTS:                        8.1    9.49  )-----------( 310      ( 12 Apr 2023 05:54 )             24.4     04-11    134<L>  |  101  |  6<L>  ----------------------------<  110<H>  3.5   |  23  |  0.5<L>    Ca    7.9<L>      11 Apr 2023 21:22  Phos  2.9     04-11  Mg     1.9     04-11    TPro  5.1<L>  /  Alb  2.9<L>  /  TBili  0.9  /  DBili  0.4<H>  /  AST  62<H>  /  ALT  217<H>  /  AlkPhos  689<H>  04-11      LIVER FUNCTIONS - ( 11 Apr 2023 21:22 )  Alb: 2.9 g/dL / Pro: 5.1 g/dL / ALK PHOS: 689 U/L / ALT: 217 U/L / AST: 62 U/L / GGT: x               Culture - Body Fluid with Gram Stain (collected 04-03-23 @ 12:40)  Source: .Body Fluid None  Gram Stain (04-04-23 @ 05:15):    No polymorphonuclear cells seen    No organisms seen    by cytocentrifuge  Final Report (04-09-23 @ 09:22):    No growth at 5 days            INFECTIOUS DISEASES TESTING  COVID-19 PCR: NotDetec (04-09-23 @ 15:07)  strept    INFLAMMATORY MARKERS      RADIOLOGY & ADDITIONAL TESTS:  I have personally reviewed the last available Chest xray  CXR      CT  CT Abdomen and Pelvis w/ Oral Cont and w/ IV Cont:   ACC: [id number] EXAM:  CT ABDOMEN AND PELVIS OC IC   ORDERED BY: TAYLER CORNELL     PROCEDURE DATE:  04/09/2023          INTERPRETATION:  CLINICAL STATEMENT: Gastrointestinal bleed. Pancreatic   mass. Pancreatic head mass status post pylorus-sparing   pancreaticoduodenectomy on 4/3/2023    TECHNIQUE: Contiguous axial CT images were obtained from the lower chest   to the pubic symphysis following administration of 100cc Omnipaque 350   intravenous contrast.  Oral contrast was administered.  Reformatted   images in the coronal and sagittal planes were acquired.    COMPARISON: CT abdomen and pelvis 2/16/2023.      FINDINGS:    LOWER CHEST: Trace left pleural effusion. Bilateral lower lobe   subsegmental atelectasis.    HEPATOBILIARY/PANCREAS: Post pylorus-sparing pancreaticoduodenectomy with   placement of a choledochojejunostomy stent and pancreaticojejunostomy   stent. Right upper quadrant JANICE drain. Moderate amount of fat stranding   and small amount of free fluid in the right upper quadrant, likely   postsurgical.    Hepatic steatosis. No significant dilation of the intrahepatic bile   ducts. Unchanged subcentimeter hypodensity in the right hepatic dome too   small to characterize.    SPLEEN: Unremarkable.    ADRENAL GLANDS: Unremarkable.    KIDNEYS: Symmetric renal enhancement. No hydronephrosis.    ABDOMINOPELVIC NODES: Unremarkable.    PELVIC ORGANS: Foci of air in the bladder, likely from prior   instrumentation.    PERITONEUM/MESENTERY/BOWEL: Oral contrast is seen up tothe ileum. No   evidence of bowel obstruction or pneumoperitoneum. Small amount of pelvic   free fluid. Normal caliber appendix.    BONES/SOFT TISSUES: Soft tissue edema of the right flank abdominal wall   muscular/fascial layer. No evidence of acutedrainable collection.   Degenerative changes of the spine.    OTHER: Mild scattered atherosclerotic plaque.      IMPRESSION:    No evidence of acute abdominal pathology.    Post pancreaticoduodenectomy with expected recent postoperative change.    --- End of Report ---          QUIN FRITZ MD; Resident Radiologist  This document has been electronically signed.  LEXX LAINEZ MD; Attending Radiologist  This document has been electronically signed. Apr 9 2023 11:58PM (04-09-23 @ 16:43)      CARDIOLOGY TESTING  12 Lead ECG:   Ventricular Rate 98 BPM    Atrial Rate 98 BPM    P-R Interval 144 ms    QRS Duration 78 ms    Q-T Interval 348 ms    QTC Calculation(Bazett) 444 ms    P Axis 37 degrees    R Axis 0 degrees    T Axis 1 degrees    Diagnosis Line Normal sinus rhythm  Inferior infarct , age undetermined  Abnormal ECG    Confirmed by Elisha Moses MD ([id number]) on 4/10/2023 8:29:51 AM (04-09-23 @ 17:46)  12 Lead ECG:   Ventricular Rate 87 BPM    Atrial Rate 87 BPM    P-R Interval 164 ms    QRS Duration 80 ms    Q-T Interval 372 ms    QTC Calculation(Bazett) 447 ms    P Axis 46 degrees    R Axis -4 degrees    T Axis 3 degrees    Diagnosis Line Normal sinus rhythm  Inferior infarct , age undetermined  Nonspecific T wave abnormality  Abnormal ECG    Confirmed by Elisha Moses MD ([id number]) on 4/4/2023 9:20:20 AM (04-03-23 @ 21:27)      MEDICATIONS  enoxaparin Injectable 40 SubCutaneous every 24 hours  insulin lispro (ADMELOG) corrective regimen sliding scale  SubCutaneous every 6 hours  lactated ringers. 1000 IV Continuous <Continuous>  losartan 100 Oral daily  magnesium sulfate  IVPB 2 IV Intermittent once  pantoprazole  Injectable 40 IV Push every 12 hours  piperacillin/tazobactam IVPB.. 3.375 IV Intermittent every 8 hours  potassium chloride  20 mEq/100 mL IVPB 20 IV Intermittent every 2 hours  sucralfate suspension 1 Oral every 6 hours      WEIGHT  Weight (kg): 68.9 (04-10-23 @ 12:07)  Creatinine, Serum: 0.5 mg/dL (04-11-23 @ 21:22)      ANTIBIOTICS:  piperacillin/tazobactam IVPB.. 3.375 Gram(s) IV Intermittent every 8 hours      All available historical records have been reviewed

## 2023-04-12 NOTE — DIETITIAN INITIAL EVALUATION ADULT - ORAL INTAKE PTA/DIET HISTORY
Pt known to writer from previous admission for whipple. Hx obtained from pt at bedside. Pt is a native speaker of pt's preferred language; interview was conducted in Mandarin. Reports she was readmitted after an overnight stay at home with minimal PO intake. Today is day 7 of minimal PO intake. Previously known to have had good PO intake prior to admission Whipple surgery on 4/3. No food allergy/intolerance. Diet is balanced with food from all food groups. Avoids sugary food & drinks and excessive carb intake. No supplement use. No chewing/swallowing issues. UBW 152lbs with no weight change. Height 160cm.

## 2023-04-12 NOTE — PROGRESS NOTE ADULT - ASSESSMENT
63 y.o F w/ Hx of HTN, HLD, DM, and hx of MD-IPMN s/p pylorus-preserving Whipple (Pancreaticoduodenectomy) on 4/3/2023 (low-grade intestinal type w/ no HGD or malignancy), presenting for pre-syncope and reported episodes of black stools, being evaluated for concern of UGIB.    # Anemia: stable   # Reported Melena  # Recent Pylorus-preserving Whipple Pancreaticoduodenectomy   - patient is hemodynamically stable  - labs reviewed  - CT scan reviewed  - prior EGD/EUS and pathology reports reviewed   - s/p EGD 4/10 (please refer to the report in sunrise for full details)     recommendations:  - diet per primary team   - IV PPI BID for 1 more day then switch to PO BID  - Active type and screen  - monitor for signs of GI bleeding     # Elevated liver enzymes: improving   - likely due to edema at the hepaticojejunostomy vs ? occlusion from a clot in the setting of bleed at the hepaticojejunostomy  - labs reviewed     recommendations:  - trend LFTs  - get US RUQ w/ doppler  - send viral hepatitis panel for completion (HAV IgG, HBcAb, HBsAb, HBsAg, HCV ab)  - avoid hepatotoxic agents    we will follow       63 y.o F w/ Hx of HTN, HLD, DM, and hx of MD-IPMN s/p pylorus-preserving Whipple (Pancreaticoduodenectomy) on 4/3/2023 (low-grade intestinal type w/ no HGD or malignancy), presenting for pre-syncope and reported episodes of black stools, initially evaluated for concern of UGIB, being followed.    # Anemia: stable   # Reported Melena  # Recent Pylorus-preserving Whipple Pancreaticoduodenectomy   - patient is hemodynamically stable  - labs reviewed  - CT scan reviewed  - prior EGD/EUS and pathology reports reviewed   - s/p EGD 4/10 revealing a marginal ulcer at the duodenojejunal anastomosis, and multiple small clean based gastric ulcers (please refer to the report in sunrise for full details)     recommendations:  - diet per primary team   - IV PPI BID for 1 more day then switch to PO BID  - Active type and screen  - monitor for signs of GI bleeding   - monitor Hb daily and transfuse to keep > 7    #abd pain worse at night time   most likely post-operative pain  tolerating PO diet but reports increasing pain at night   conservative management and supportive care for now given overall clinical improvement  -will monitor pain and if persists, may consider repeat imaging      # Elevated liver enzymes: improving   - likely due to edema at the hepaticojejunostomy vs ? occlusion from a clot in the setting of bleed at the hepaticojejunostomy  - labs reviewed     recommendations:  - trend LFTs  - get US RUQ w/ doppler  - send viral hepatitis panel for completion (HAV IgG, HBcAb, HBsAb, HBsAg, HCV ab)  - avoid hepatotoxic agents

## 2023-04-12 NOTE — PROGRESS NOTE ADULT - SUBJECTIVE AND OBJECTIVE BOX
Gastroenterology progress note:     Patient is a 63y old  Female who presents with a chief complaint of Abdominal pain     (12 Apr 2023 14:03)       Admitted on: 04-09-23    We are following the patient for: melena and elevated liver enzymes        Interval History:    No acute events overnight.   had 1 small bm last nigh  drop in Hb and s/p 1 pRBC      PAST MEDICAL & SURGICAL HISTORY:  HTN (hypertension)      High cholesterol      Diabetes      COPD (chronic obstructive pulmonary disease)      Kidney stones      No significant past surgical history          MEDICATIONS  (STANDING):  enoxaparin Injectable 40 milliGRAM(s) SubCutaneous every 24 hours  insulin lispro (ADMELOG) corrective regimen sliding scale   SubCutaneous every 6 hours  lactated ringers. 1000 milliLiter(s) (50 mL/Hr) IV Continuous <Continuous>  losartan 100 milliGRAM(s) Oral daily  magnesium sulfate  IVPB 2 Gram(s) IV Intermittent once  pantoprazole  Injectable 40 milliGRAM(s) IV Push every 12 hours  piperacillin/tazobactam IVPB.. 3.375 Gram(s) IV Intermittent every 8 hours  potassium chloride  20 mEq/100 mL IVPB 20 milliEquivalent(s) IV Intermittent every 2 hours  sucralfate suspension 1 Gram(s) Oral every 6 hours    MEDICATIONS  (PRN):  HYDROmorphone  Injectable 0.5 milliGRAM(s) IV Push every 6 hours PRN Severe Pain (7 - 10)      Allergies  No Known Allergies      Review of Systems:   Cardiovascular:  No Chest Pain, No Palpitations  Respiratory:  No Cough, No Dyspnea  Gastrointestinal:  As described in HPI  Skin:  No Skin Lesions, No Jaundice  Neuro:  No Syncope, No Dizziness    Physical Examination:  T(C): 36.5 (04-12-23 @ 08:00), Max: 37 (04-11-23 @ 20:00)  HR: 78 (04-12-23 @ 14:00) (72 - 100)  BP: 146/73 (04-12-23 @ 14:00) (116/69 - 146/73)  RR: 17 (04-12-23 @ 14:00) (12 - 26)  SpO2: 99% (04-12-23 @ 14:00) (94% - 100%)      04-11-23 @ 07:01  -  04-12-23 @ 07:00  --------------------------------------------------------  IN: 2630 mL / OUT: 1360 mL / NET: 1270 mL    04-12-23 @ 07:01  -  04-12-23 @ 16:16  --------------------------------------------------------  IN: 350 mL / OUT: 430 mL / NET: -80 mL        GENERAL: AAOx3, no acute distress.  HEAD:  Atraumatic, Normocephalic  EYES: conjunctiva and sclera clear  NECK: Supple, no JVD or thyromegaly  CHEST/LUNG: Clear to auscultation bilaterally; No wheeze, rhonchi, or rales  HEART: Regular rate and rhythm; normal S1, S2, No murmurs.  ABDOMEN: Soft, nondistended; Bowel sounds present, clean wound site with wound vac noted, drains in place  NEUROLOGY: No asterixis or tremor.   SKIN: Intact, no jaundice     Data:                        7.5    9.01  )-----------( 289      ( 12 Apr 2023 11:22 )             22.6     Hgb trend:  7.5  04-12-23 @ 11:22  8.1  04-12-23 @ 05:54  6.7  04-12-23 @ 00:25  6.3  04-11-23 @ 21:22  7.2  04-10-23 @ 22:14  7.2  04-10-23 @ 06:10  7.5  04-09-23 @ 23:00      04-11-23 @ 07:01  -  04-12-23 @ 07:00  --------------------------------------------------------  IN: 300 mL      04-11    134<L>  |  101  |  6<L>  ----------------------------<  110<H>  3.5   |  23  |  0.5<L>    Ca    7.9<L>      11 Apr 2023 21:22  Phos  2.9     04-11  Mg     1.9     04-11    TPro  5.1<L>  /  Alb  2.9<L>  /  TBili  0.9  /  DBili  0.4<H>  /  AST  62<H>  /  ALT  217<H>  /  AlkPhos  689<H>  04-11    Liver panel trend:  TBili 0.9   /   AST 62   /      /   AlkP 689   /   Tptn 5.1   /   Alb 2.9    /   DBili 0.4      04-11  TBili 1.2   /      /      /   AlkP 952   /   Tptn 5.5   /   Alb 3.1    /   DBili 0.6      04-10  TBili 2.4   /      /      /   AlkP 1108   /   Tptn 5.4   /   Alb 3.0    /   DBili 2.1      04-10  TBili 3.4   /      /      /   AlkP 1050   /   Tptn 5.1   /   Alb 3.0    /   DBili 3.1      04-09  TBili 3.3   /      /      /   AlkP 1146   /   Tptn 5.8   /   Alb 3.3    /   DBili 3.0      04-09  TBili 0.8   /   AST 93   /   ALT 63   /   AlkP 444   /   Tptn 5.5   /   Alb 3.2    /   DBili 0.2      04-07  TBili 0.7   /   AST 25   /   ALT 23   /   AlkP 201   /   Tptn 5.1   /   Alb 3.0    /   DBili 0.2      04-06  TBili 1.0   /   AST 28   /   ALT 27   /   AlkP 90   /   Tptn 5.0   /   Alb 3.1    /   DBili 0.2      04-06  TBili 1.2   /   AST 38   /   ALT 35   /   AlkP 49   /   Tptn 5.0   /   Alb 3.2    /   DBili 0.4      04-05  TBili 1.1   /   AST 54   /   ALT 63   /   AlkP 62   /   Tptn 6.4   /   Alb 4.2    /   DBili 0.3      04-03

## 2023-04-12 NOTE — PROGRESS NOTE ADULT - ASSESSMENT
Assessment & Plan  63y Female w/ hx of IPMN s/p pylorus-preserving Whipple (4/3/23), melanotic stool, tachy, febrile, upgrade for possible cholangitis    NEURO:  #Acute pain    -IV tylenol prn    -Inpatient Rx: HYDROmorphone  Injectable 0.5 milliGRAM(s) IV Push every 6 hours PRN    RESP:   #Oxygen insufficiency     -wean off NC to RA as tolerated    -encourage IS     CARDS:   #tachycardia    -resolving (HR )   #HTN    -Rx-losartan 100 daily (held)  #Imaging/Labs    -EKG (4/9): NSR; inferior infarct age indeterminate    GI/NUTR:   #s/p Whipple, RLQ JANICE, prevena in place   #Elevated LFTs (downtrending)   -Labs:  Aspartate Aminotransferase (AST/SGOT): 62 (04-11-23 @ 21:22)  Alanine Aminotransferase (ALT/SGPT): 217 (04-11-23 @ 21:22)  Bilirubin Direct, Serum: 0.4 (04-11-23 @ 21:22)  Aspartate Aminotransferase (AST/SGOT): 222 (04-10-23 @ 22:14)  Alanine Aminotransferase (ALT/SGPT): 387 (04-10-23 @ 22:14)  Bilirubin Direct, Serum: 0.6 (04-10-23 @ 22:14)    #Melanotic stool    -GI following, s/p EGD, if any more melanotic stool will re-eval the HJ anastomosis     -EDG 4/10: Multiple small (<1 cm) clean based ulcers in the antrum. A marginal, circumferential anastomotic ulcer measuring around 3 cm was seen at the duodeno-jejunotomy with surrounding edema, stigmata of recent bleeding, and visible surgical sutures. There was no evidence of active bleeding. The efferent limb was examined and had no obstruction but had multiple small (< 1cm) clean based ulcers. The EGD scope was then exchanged to a PCF colonoscope and the afferent limb was examined with bile seen and no evidence of bleeding. The plastic CBD stent was noted to be in position.    -Monitor Hgb    -Monitor bowel function    -PPI bid, Carafate   #Diet, CLD    -aspiration precautions, HOB 30  #GI Prophylaxis    -PPI BID  #Bowel regimen- held    -last bowel movement 4/12    /RENAL:   #urine output in critically ill    -voiding    LR @ 50cc, IV lock when pt takes sufficient PO intake  #Labs:          BUN/Cr- 8/0.6  -->,  6/0.5  -->          Electrolytes-Na 134 // K 3.5 // Mg 1.9 //  Phos 2.9 (04-11 @ 21:22)  #maintain euvolemia       HEME/ONC:   #DVT prophylaxis   -enoxaparin Injectable, SCDs    Labs: Hb/Hct:  6.3/19.1  -->,  6.7/20.2 -- s/p 1 uPRBc -->                       Plts:  288  -->,  334  -->                 PTT/INR:    #T&S Expires: 4/15/23  #Blood consent in chart    -total amount of PRBc this admission: 1    ID:  WBC- 9.33  --->>,  9.47  --->>,  9.79  --->>  Temp trend- 24hrs T(F): 98.4 (04-12 @ 00:00), Max: 98.6 (04-11 @ 20:00)  Antibiotics-piperacillin/tazobactam IVPB.. 3.375 every 8 hours    ENDO:    -FSG q6 if NPO or Tube feeds    -Glucose goal 140-180    -if above 180 start ISS     HA1C 7.1 (3/13/23)    MSK:     Activity - Ambulate as Tolerated:     Time/Priority:  Routine (04-09-23 @ 18:46)    LINES/DRAINS:  PIV    ADVANCED DIRECTIVES:  Full Code    HCP/Emergency Contact-     INDICATION FOR SICU: Cholangitis    DISPO: SICU vs SDU vs downgrade  Assessment & Plan  63y Female w/ hx of IPMN s/p pylorus-preserving Whipple (4/3/23), melanotic stool, tachy, febrile, upgrade for possible cholangitis    NEURO:  #Acute pain    -IV tylenol prn    -Inpatient Rx: HYDROmorphone  Injectable 0.5 milliGRAM(s) IV Push every 6 hours PRN    RESP:   #Hx of COPD    -Maintain O2 sat > 90% on RA    -encourage IS     CARDS:   #tachycardia    -resolving (HR )   #HTN    -Rx-losartan 100 daily started  #Imaging/Labs    -EKG (4/9): NSR; inferior infarct age indeterminate    GI/NUTR:   #s/p Whipple, RLQ JANICE,   #Elevated LFTs (downtrending)   -Labs:  Aspartate Aminotransferase (AST/SGOT): 62 (04-11-23 @ 21:22)  Alanine Aminotransferase (ALT/SGPT): 217 (04-11-23 @ 21:22)  Bilirubin Direct, Serum: 0.4 (04-11-23 @ 21:22)  Aspartate Aminotransferase (AST/SGOT): 222 (04-10-23 @ 22:14)  Alanine Aminotransferase (ALT/SGPT): 387 (04-10-23 @ 22:14)  Bilirubin Direct, Serum: 0.6 (04-10-23 @ 22:14)    #Melanotic stool    -GI following, s/p EGD, if any more melanotic stool will re-eval the HJ anastomosis     -EDG 4/10: Multiple small (<1 cm) clean based ulcers in the antrum. A marginal, circumferential anastomotic ulcer measuring around 3 cm was seen at the duodeno-jejunotomy with surrounding edema, stigmata of recent bleeding, and visible surgical sutures. There was no evidence of active bleeding. The efferent limb was examined and had no obstruction but had multiple small (< 1cm) clean based ulcers. The EGD scope was then exchanged to a PCF colonoscope and the afferent limb was examined with bile seen and no evidence of bleeding. The plastic CBD stent was noted to be in position.    -Hgb downtrended overnight, received 1uPRBCs. No overt signs of bleeding    -Continue serial CBCs, next one at 11am    -Monitor bowel function    -PPI bid, Carafate   #Diet, CLD    -aspiration precautions, HOB 30  #GI Prophylaxis    -PPI BID  #Bowel regimen- held    -last bowel movement 4/12    /RENAL:   #urine output in critically ill    -voiding    LR @ 50cc, IV lock when pt takes sufficient PO intake  #Labs:          BUN/Cr WNL          Electrolytes-Na 134 // K 3.5 // Mg 1.9 //  Phos 2.9 (04-11 @ 21:22)  #maintain euvolemia       HEME/ONC:   #DVT prophylaxis   -enoxaparin Injectable, SCDs    Labs: Hb/Hct:  6.3/19.1  -->,  6.7/20.2 -- s/p 1 uPRBc --> 8.1/24.4                      Plts:  288  -->,  334  --> 310                PTT/INR:    #T&S Expires: 4/15/23  #Blood consent in chart    -total amount of PRBc this admission: 1    ID:  WBC- 9.33  --->>,  9.47  --->>,  9.79  --->> 9.49  Temp trend- 24hrs T(F): 98.4 (04-12 @ 00:00), Max: 98.6 (04-11 @ 20:00)  Antibiotics-piperacillin/tazobactam IVPB.. 3.375 every 8 hours  f/u blood cultures from 4/11    ENDO:    -FSG q6 if NPO or Tube feeds    -Glucose goal 140-180    -if above 180 start ISS     HA1C 7.1 (3/13/23)    MSK:     Activity - Ambulate as Tolerated:     Time/Priority:  Routine (04-09-23 @ 18:46)    LINES/DRAINS:  PIV    ADVANCED DIRECTIVES:  Full Code    HCP/Emergency Contact-     INDICATION FOR SICU: Cholangitis    DISPO: SICU

## 2023-04-12 NOTE — DIETITIAN INITIAL EVALUATION ADULT - ORAL NUTRITION SUPPLEMENTS
Add prosource gelatein 20 sugar free 3x/day (90kcal, 20g protein each).   Ensure Clear if able to tolerate concentrated sweets given concerns for dumping syndrome. If not tolerated, would discontinue.

## 2023-04-13 LAB
ALBUMIN SERPL ELPH-MCNC: 3 G/DL — LOW (ref 3.5–5.2)
ALP SERPL-CCNC: 646 U/L — HIGH (ref 30–115)
ALT FLD-CCNC: 113 U/L — HIGH (ref 0–41)
ANION GAP SERPL CALC-SCNC: 14 MMOL/L — SIGNIFICANT CHANGE UP (ref 7–14)
AST SERPL-CCNC: 36 U/L — SIGNIFICANT CHANGE UP (ref 0–41)
BASOPHILS # BLD AUTO: 0.01 K/UL — SIGNIFICANT CHANGE UP (ref 0–0.2)
BASOPHILS NFR BLD AUTO: 0.2 % — SIGNIFICANT CHANGE UP (ref 0–1)
BILIRUB DIRECT SERPL-MCNC: 0.4 MG/DL — HIGH (ref 0–0.3)
BILIRUB INDIRECT FLD-MCNC: 0.3 MG/DL — SIGNIFICANT CHANGE UP (ref 0.2–1.2)
BILIRUB SERPL-MCNC: 0.7 MG/DL — SIGNIFICANT CHANGE UP (ref 0.2–1.2)
BUN SERPL-MCNC: 5 MG/DL — LOW (ref 10–20)
CALCIUM SERPL-MCNC: 8.6 MG/DL — SIGNIFICANT CHANGE UP (ref 8.4–10.5)
CHLORIDE SERPL-SCNC: 99 MMOL/L — SIGNIFICANT CHANGE UP (ref 98–110)
CO2 SERPL-SCNC: 27 MMOL/L — SIGNIFICANT CHANGE UP (ref 17–32)
CREAT SERPL-MCNC: 0.6 MG/DL — LOW (ref 0.7–1.5)
EGFR: 101 ML/MIN/1.73M2 — SIGNIFICANT CHANGE UP
EOSINOPHIL # BLD AUTO: 0.06 K/UL — SIGNIFICANT CHANGE UP (ref 0–0.7)
EOSINOPHIL NFR BLD AUTO: 1 % — SIGNIFICANT CHANGE UP (ref 0–8)
GLUCOSE BLDC GLUCOMTR-MCNC: 134 MG/DL — HIGH (ref 70–99)
GLUCOSE BLDC GLUCOMTR-MCNC: 142 MG/DL — HIGH (ref 70–99)
GLUCOSE BLDC GLUCOMTR-MCNC: 164 MG/DL — HIGH (ref 70–99)
GLUCOSE BLDC GLUCOMTR-MCNC: 180 MG/DL — HIGH (ref 70–99)
GLUCOSE SERPL-MCNC: 135 MG/DL — HIGH (ref 70–99)
HCT VFR BLD CALC: 23.4 % — LOW (ref 37–47)
HCT VFR BLD CALC: 23.7 % — LOW (ref 37–47)
HGB BLD-MCNC: 7.5 G/DL — LOW (ref 12–16)
HGB BLD-MCNC: 7.8 G/DL — LOW (ref 12–16)
IMM GRANULOCYTES NFR BLD AUTO: 1.3 % — HIGH (ref 0.1–0.3)
LIDOCAIN IGE QN: 214 U/L — HIGH (ref 7–60)
LYMPHOCYTES # BLD AUTO: 0.88 K/UL — LOW (ref 1.2–3.4)
LYMPHOCYTES # BLD AUTO: 14.3 % — LOW (ref 20.5–51.1)
MAGNESIUM SERPL-MCNC: 2 MG/DL — SIGNIFICANT CHANGE UP (ref 1.8–2.4)
MCHC RBC-ENTMCNC: 29.5 PG — SIGNIFICANT CHANGE UP (ref 27–31)
MCHC RBC-ENTMCNC: 30 PG — SIGNIFICANT CHANGE UP (ref 27–31)
MCHC RBC-ENTMCNC: 32.1 G/DL — SIGNIFICANT CHANGE UP (ref 32–37)
MCHC RBC-ENTMCNC: 32.9 G/DL — SIGNIFICANT CHANGE UP (ref 32–37)
MCV RBC AUTO: 91.2 FL — SIGNIFICANT CHANGE UP (ref 81–99)
MCV RBC AUTO: 92.1 FL — SIGNIFICANT CHANGE UP (ref 81–99)
MONOCYTES # BLD AUTO: 0.4 K/UL — SIGNIFICANT CHANGE UP (ref 0.1–0.6)
MONOCYTES NFR BLD AUTO: 6.5 % — SIGNIFICANT CHANGE UP (ref 1.7–9.3)
NEUTROPHILS # BLD AUTO: 4.74 K/UL — SIGNIFICANT CHANGE UP (ref 1.4–6.5)
NEUTROPHILS NFR BLD AUTO: 76.7 % — HIGH (ref 42.2–75.2)
NRBC # BLD: 0 /100 WBCS — SIGNIFICANT CHANGE UP (ref 0–0)
NRBC # BLD: 0 /100 WBCS — SIGNIFICANT CHANGE UP (ref 0–0)
PHOSPHATE SERPL-MCNC: 3.3 MG/DL — SIGNIFICANT CHANGE UP (ref 2.1–4.9)
PLATELET # BLD AUTO: 317 K/UL — SIGNIFICANT CHANGE UP (ref 130–400)
PLATELET # BLD AUTO: 361 K/UL — SIGNIFICANT CHANGE UP (ref 130–400)
PMV BLD: 10.5 FL — HIGH (ref 7.4–10.4)
PMV BLD: 9.7 FL — SIGNIFICANT CHANGE UP (ref 7.4–10.4)
POTASSIUM SERPL-MCNC: 4 MMOL/L — SIGNIFICANT CHANGE UP (ref 3.5–5)
POTASSIUM SERPL-SCNC: 4 MMOL/L — SIGNIFICANT CHANGE UP (ref 3.5–5)
PROT SERPL-MCNC: 5.9 G/DL — LOW (ref 6–8)
RBC # BLD: 2.54 M/UL — LOW (ref 4.2–5.4)
RBC # BLD: 2.6 M/UL — LOW (ref 4.2–5.4)
RBC # FLD: 13.3 % — SIGNIFICANT CHANGE UP (ref 11.5–14.5)
RBC # FLD: 13.7 % — SIGNIFICANT CHANGE UP (ref 11.5–14.5)
SODIUM SERPL-SCNC: 140 MMOL/L — SIGNIFICANT CHANGE UP (ref 135–146)
WBC # BLD: 6.17 K/UL — SIGNIFICANT CHANGE UP (ref 4.8–10.8)
WBC # BLD: 8.16 K/UL — SIGNIFICANT CHANGE UP (ref 4.8–10.8)
WBC # FLD AUTO: 6.17 K/UL — SIGNIFICANT CHANGE UP (ref 4.8–10.8)
WBC # FLD AUTO: 8.16 K/UL — SIGNIFICANT CHANGE UP (ref 4.8–10.8)

## 2023-04-13 PROCEDURE — 99233 SBSQ HOSP IP/OBS HIGH 50: CPT | Mod: 24

## 2023-04-13 PROCEDURE — 71045 X-RAY EXAM CHEST 1 VIEW: CPT | Mod: 26

## 2023-04-13 RX ORDER — INSULIN LISPRO 100/ML
VIAL (ML) SUBCUTANEOUS
Refills: 0 | Status: DISCONTINUED | OUTPATIENT
Start: 2023-04-13 | End: 2023-04-17

## 2023-04-13 RX ORDER — ACETAMINOPHEN 500 MG
650 TABLET ORAL EVERY 6 HOURS
Refills: 0 | Status: DISCONTINUED | OUTPATIENT
Start: 2023-04-13 | End: 2023-04-17

## 2023-04-13 RX ORDER — ONDANSETRON 8 MG/1
4 TABLET, FILM COATED ORAL ONCE
Refills: 0 | Status: COMPLETED | OUTPATIENT
Start: 2023-04-13 | End: 2023-04-13

## 2023-04-13 RX ADMIN — ENOXAPARIN SODIUM 40 MILLIGRAM(S): 100 INJECTION SUBCUTANEOUS at 05:34

## 2023-04-13 RX ADMIN — Medication 650 MILLIGRAM(S): at 08:03

## 2023-04-13 RX ADMIN — Medication 2: at 11:45

## 2023-04-13 RX ADMIN — Medication 1 GRAM(S): at 05:33

## 2023-04-13 RX ADMIN — Medication 650 MILLIGRAM(S): at 18:32

## 2023-04-13 RX ADMIN — PANTOPRAZOLE SODIUM 40 MILLIGRAM(S): 20 TABLET, DELAYED RELEASE ORAL at 05:33

## 2023-04-13 RX ADMIN — SODIUM CHLORIDE 50 MILLILITER(S): 9 INJECTION, SOLUTION INTRAVENOUS at 08:03

## 2023-04-13 RX ADMIN — Medication 650 MILLIGRAM(S): at 18:02

## 2023-04-13 RX ADMIN — LOSARTAN POTASSIUM 100 MILLIGRAM(S): 100 TABLET, FILM COATED ORAL at 05:34

## 2023-04-13 RX ADMIN — PIPERACILLIN AND TAZOBACTAM 25 GRAM(S): 4; .5 INJECTION, POWDER, LYOPHILIZED, FOR SOLUTION INTRAVENOUS at 05:35

## 2023-04-13 RX ADMIN — PANTOPRAZOLE SODIUM 40 MILLIGRAM(S): 20 TABLET, DELAYED RELEASE ORAL at 18:01

## 2023-04-13 RX ADMIN — PIPERACILLIN AND TAZOBACTAM 25 GRAM(S): 4; .5 INJECTION, POWDER, LYOPHILIZED, FOR SOLUTION INTRAVENOUS at 21:19

## 2023-04-13 RX ADMIN — HYDROMORPHONE HYDROCHLORIDE 0.5 MILLIGRAM(S): 2 INJECTION INTRAMUSCULAR; INTRAVENOUS; SUBCUTANEOUS at 21:19

## 2023-04-13 RX ADMIN — SODIUM CHLORIDE 50 MILLILITER(S): 9 INJECTION, SOLUTION INTRAVENOUS at 11:42

## 2023-04-13 RX ADMIN — Medication 1 GRAM(S): at 11:42

## 2023-04-13 RX ADMIN — ONDANSETRON 4 MILLIGRAM(S): 8 TABLET, FILM COATED ORAL at 05:33

## 2023-04-13 RX ADMIN — Medication 1 GRAM(S): at 18:02

## 2023-04-13 RX ADMIN — PIPERACILLIN AND TAZOBACTAM 25 GRAM(S): 4; .5 INJECTION, POWDER, LYOPHILIZED, FOR SOLUTION INTRAVENOUS at 14:51

## 2023-04-13 RX ADMIN — Medication 2: at 16:07

## 2023-04-13 RX ADMIN — Medication 650 MILLIGRAM(S): at 08:30

## 2023-04-13 NOTE — PROGRESS NOTE ADULT - SUBJECTIVE AND OBJECTIVE BOX
GENERAL SURGERY PROGRESS NOTE    Patient: JEREMIAS IVEY , 63y ([birth year])Female   MRN: [id number]  Location: [address]  Visit: 04-09-23 Inpatient  Date: 04-13-23 @ 08:47      Procedure/Dx/Injuries: s/p whipple, abdominal pain r/o cholangitis    Events of past 24 hours: afebrile, cont w zosyn, losartan restarted, 1 episode of melena, trending H&H    PAST MEDICAL & SURGICAL HISTORY:  HTN (hypertension)      High cholesterol      Diabetes      COPD (chronic obstructive pulmonary disease)      Kidney stones      No significant past surgical history          Vitals:   T(F): 98.4 (04-13-23 @ 08:00), Max: 100.2 (04-12-23 @ 20:00)  HR: 84 (04-13-23 @ 08:00)  BP: 130/73 (04-13-23 @ 08:00)  RR: 19 (04-13-23 @ 08:00)  SpO2: 94% (04-13-23 @ 08:00)      Diet, Clear Liquid:   Consistent Carbohydrate No Snacks  DASH/TLC Sodium & Cholesterol Restricted  Prosource Gelatein 20 Sugar Free     Qty per Day:  3  Supplement Feeding Modality:  Oral  Ensure Clear Cans or Servings Per Day:  1       Frequency:  Daily      Fluids:     I & O's:    04-12-23 @ 07:01  -  04-13-23 @ 07:00  --------------------------------------------------------  IN:    IV PiggyBack: 200 mL    Lactated Ringers: 1200 mL    Oral Fluid: 400 mL  Total IN: 1800 mL    OUT:    Bulb (mL): 70 mL    Voided (mL): 2100 mL  Total OUT: 2170 mL    Total NET: -370 mL          PHYSICAL EXAM:  General Appearance: Aaox3, NAD  Heart: RRR  Lungs: CTAx2  Abdomen:  soft, non tender, non distended, post surgical wound w clean dressing, JANICE w SS output  MSK/Extremities:  mobile  Skin: no jaundice    MEDICATIONS  (STANDING):  enoxaparin Injectable 40 milliGRAM(s) SubCutaneous every 24 hours  insulin lispro (ADMELOG) corrective regimen sliding scale   SubCutaneous every 6 hours  lactated ringers. 1000 milliLiter(s) (50 mL/Hr) IV Continuous <Continuous>  losartan 100 milliGRAM(s) Oral daily  pantoprazole  Injectable 40 milliGRAM(s) IV Push every 12 hours  piperacillin/tazobactam IVPB.. 3.375 Gram(s) IV Intermittent every 8 hours  sucralfate suspension 1 Gram(s) Oral every 6 hours    MEDICATIONS  (PRN):  acetaminophen     Tablet .. 650 milliGRAM(s) Oral every 6 hours PRN Mild Pain (1 - 3)  HYDROmorphone  Injectable 0.5 milliGRAM(s) IV Push every 6 hours PRN Severe Pain (7 - 10)      DVT PROPHYLAXIS: enoxaparin Injectable 40 milliGRAM(s) SubCutaneous every 24 hours    GI PROPHYLAXIS: pantoprazole  Injectable 40 milliGRAM(s) IV Push every 12 hours    ANTICOAGULATION:   ANTIBIOTICS:  piperacillin/tazobactam IVPB.. 3.375 Gram(s)            LAB/STUDIES:  Labs:  CAPILLARY BLOOD GLUCOSE      POCT Blood Glucose.: 142 mg/dL (13 Apr 2023 05:29)  POCT Blood Glucose.: 127 mg/dL (12 Apr 2023 23:40)  POCT Blood Glucose.: 135 mg/dL (12 Apr 2023 17:34)  POCT Blood Glucose.: 121 mg/dL (12 Apr 2023 12:34)                          7.4    9.62  )-----------( 321      ( 12 Apr 2023 21:15 )             22.2       Auto Neutrophil %: 82.0 % (04-12-23 @ 11:22)  Auto Immature Granulocyte %: 1.6 % (04-12-23 @ 11:22)    04-12    138  |  103  |  5<L>  ----------------------------<  130<H>  4.1   |  22  |  0.5<L>      Calcium, Total Serum: 8.4 mg/dL (04-12-23 @ 21:15)      LFTs:             5.4  | 0.6  | 36       ------------------[653     ( 12 Apr 2023 22:40 )  2.9  | 0.3  | 146         Lipase:x      Amylase:x             Coags:                Culture - Blood (collected 11 Apr 2023 13:02)  Source: .Blood Blood-Peripheral  Preliminary Report (12 Apr 2023 23:01):    No growth to date.    Culture - Blood (collected 11 Apr 2023 11:00)  Source: .Blood Blood-Peripheral  Preliminary Report (12 Apr 2023 23:01):    No growth to date.              IMAGING:      ACCESS/ DEVICES:  [x ] Peripheral IV  [ ] Central Venous Line	[ ] R	[ ] L	[ ] IJ	[ ] Fem	[ ] SC	Placed:   [ ] Arterial Line		[ ] R	[ ] L	[ ] Fem	[ ] Rad	[ ] Ax	Placed:   [ ] PICC:					[ ] Mediport  [ ] Urinary Catheter,  Date Placed:   [ ] Chest tube: [ ] Right, [ ] Left  [ ] JANICE/Emmett Drains

## 2023-04-13 NOTE — PROGRESS NOTE ADULT - ASSESSMENT
Assessment & Plan  63y Female w/ hx of IPMN s/p pylorus-preserving Whipple (4/3/23), melanotic stool, tachy, febrile, upgrade for possible cholangitis    NEURO:  #Acute pain    -APAP 650mg q6 prn    -Inpatient Rx: HYDROmorphone  Injectable 0.5 milliGRAM(s) IV Push every 6 hours PRN    RESP:   #Oxygen insufficiency     -wean off NC to RA as tolerated    -encourage IS     CARDS:   #tachycardia    -nicom prn  #HTN    -Rx-losartan 100 daily (held)  #Imaging/Labs    -EKG (4/9): NSR; inferior infarct age indeterminate    GI/NUTR:   #POD #8 s/p Whipple, RLQ JANICE, prevena in place   #Elevated LFTs (downtrending)    #Melanotic stool    -GI following, s/p EGD, if any more melanotic stool will re-eval the HJ anastomosis     -EDG 4/10: Multiple small (<1 cm) clean based ulcers in the antrum. A marginal, circumferential anastomotic ulcer measuring around 3 cm was seen at the duodeno-jejunotomy with surrounding edema, stigmata of recent bleeding, and visible surgical sutures. There was no evidence of active bleeding. The efferent limb was examined and had no obstruction but had multiple small (< 1  cm) clean based ulcers. The EGD scope was then exchanged to a PCF colonoscope and the afferent limb was examined with bile seen and no evidence of bleeding. The plastic CBD stent was noted to be in position.    -Monitor Hgb    -PPI bid, carafate   #Diet, CLD    -aspiration precautions, HOB 30  #GI Prophylaxis    -PPI BID  #Bowel regimen- held    -last bowel movement 4/12    /RENAL:   #urine output in critically ill    -voiding    LR @50cc, IV lock when pt takes sufficient PO intake    Labs:          BUN/Cr- 6/0.5  -->,  5/0.5  -->          Electrolytes-Na 138 // K 4.1 // Mg 1.7 //  Phos 3.7 (04-12 @ 21:15)  #maintain euvolemia    HEME/ONC:   #DVT prophylaxis    -lovenox 40mg daily, SCDs    Labs: Hb/Hct:  7.5/22.6  -->,  7.4/22.2  -->                      Plts:  289  -->,  321  -->                 PTT/INR:     T&S Expires: 4/14  Blood Consent in chart   #DVT prophylaxis    -enoxaparin Injectable    ID:  #leukocytosis     -WBC- 9.49  --->>,  9.01  --->>,  9.62  --->>    -Temp trend- 24hrs T(F): 99.2 (04-12 @ 23:20), Max: 100.2 (04-12 @ 20:00)    -Antibiotics-piperacillin/tazobactam IVPB.. 3.375 every 8 hours    -ID following     -Cultures:    (collected 04-11 @ 13:02)  Source: .Blood Blood-Peripheral  Preliminary Report:  No growth to date.     (collected 04-11 @ 11:00)  Source: .Blood Blood-Peripheral  Preliminary Report: No growth to date.    ENDO:    -FSG q6 if NPO or Tube feeds    -Glucose goal 140-180    -if above 180 start ISS     -HA1C (3/13): 7.1    MSK:     Activity - Ambulate as Tolerated:     Time/Priority:  Routine (04-09-23 @ 18:46)    LINES/DRAINS:  PIV    ADVANCED DIRECTIVES:  Full Code    HCP/Emergency Contact-    INDICATION FOR SICU: Cholangitis    DISPO: SICU vs SDU vs downgrade    Assessment & Plan  63y Female w/ hx of IPMN s/p pylorus-preserving Whipple (4/3/23), melanotic stool, tachy, febrile, upgrade for possible cholangitis    NEURO:  #Acute pain    -APAP 650mg q6 ATC    -Inpatient Rx: HYDROmorphone  Injectable 0.5 milliGRAM(s) IV Push every 6 hours PRN    RESP:   #Oxygen insufficiency     -on RA    -encourage IS     CARDS:   #tachycardia    -nicom prn  #HTN    -Rx-losartan 100 daily (held)  #Imaging/Labs    -EKG (4/9): NSR; inferior infarct age indeterminate    GI/NUTR:   #POD #8 s/p Whipple, RLQ JANICE, prevena in place   #Elevated LFTs (downtrending)    #Melanotic stool    -GI following, s/p EGD, if any more melanotic stool will re-eval the HJ anastomosis     -EDG 4/10: Multiple small (<1 cm) clean based ulcers in the antrum. A marginal, circumferential anastomotic ulcer measuring around 3 cm was seen at the duodeno-jejunotomy with surrounding edema, stigmata of recent bleeding, and visible surgical sutures. There was no evidence of active bleeding. The efferent limb was examined and had no obstruction but had multiple small (< 1  cm) clean based ulcers. The EGD scope was then exchanged to a PCF colonoscope and the afferent limb was examined with bile seen and no evidence of bleeding. The plastic CBD stent was noted to be in position.    -Monitor Hgb    -PPI bid, carafate   #Diet, CLD    -aspiration precautions, HOB 30  #GI Prophylaxis    -PPI BID  #Bowel regimen- held    -last bowel movement 4/12    /RENAL:   #urine output in critically ill    -voiding    LR @50cc, IV lock when pt takes sufficient PO intake    Labs:          BUN/Cr- 6/0.5  -->,  5/0.5  -->          Electrolytes-Na 138 // K 4.1 // Mg 1.7 //  Phos 3.7 (04-12 @ 21:15)  #maintain euvolemia    HEME/ONC:   #DVT prophylaxis    -lovenox 40mg daily, SCDs    Labs: Hb/Hct:  7.5/22.6  -->,  7.4/22.2  -->                      Plts:  289  -->,  321  -->                 PTT/INR:     T&S Expires: 4/14  Blood Consent in chart   #DVT prophylaxis    -enoxaparin Injectable    ID:  #leukocytosis     -WBC- 9.49  --->>,  9.01  --->>,  9.62  --->>    -Temp trend- 24hrs T(F): 99.2 (04-12 @ 23:20), Max: 100.2 (04-12 @ 20:00)    -Antibiotics-piperacillin/tazobactam IVPB.. 3.375 every 8 hours    -ID following     -Cultures:    (collected 04-11 @ 13:02)  Source: .Blood Blood-Peripheral  Preliminary Report:  No growth to date.     (collected 04-11 @ 11:00)  Source: .Blood Blood-Peripheral  Preliminary Report: No growth to date.    ENDO:    -FSG q6 if NPO or Tube feeds    -Glucose goal 140-180    -if above 180 start ISS     -HA1C (3/13): 7.1    MSK:     Activity - Ambulate as Tolerated:     Time/Priority:  Routine (04-09-23 @ 18:46)    LINES/DRAINS:  PIV    ADVANCED DIRECTIVES:  Full Code    HCP/Emergency Contact-    INDICATION FOR SICU: Cholangitis    DISPO: SICU vs SDU vs downgrade

## 2023-04-13 NOTE — PROGRESS NOTE ADULT - SUBJECTIVE AND OBJECTIVE BOX
JEREMIAS IVEY     63y Female    MRN-[id number]    Admit Date: 04-09-23  ICU Admission Date: 4/10/23        ============================  HPI   63y F  POD #9 s/p Pancreaticoduodenectomy on 4/3/2023 and d/c on 4/8, presented for lightheadedness and recent episodes of melanotic stools (last 4/9 in afternoon). Denies fevers/chills, chest pain, shortness of breath, syncope. She states that before admission she had an episode of nausea and retching without vomiting and increasing abdominal pain.  In ED, Hgb remains at 7.2, LFTs elevated Tbili 3.3 now 1.2, Alk phos 1146, now 952. CT was unremarkable, GI c/s, EGD done 4/10 to evaluate the GJ anastomosis no active bleeding noted, old blood noted, negative for obstruction. Pt was tachycardic on the floor 110s, tmax 100.8, bolused 500cc LR, started IVF, zosyn given c/f cholangitis.   PMH  PAST MEDICAL & SURGICAL HISTORY:  HTN (hypertension)  High cholesterol  Diabetes  COPD (chronic obstructive pulmonary disease)  Kidney stones  No significant past surgical history    Home Meds:  Home Medications:  acetaminophen 325 mg oral tablet: 2 tab(s) orally every 6 hours as needed for  pain (08 Apr 2023 10:59)  Benicar: orally once a day (03 Apr 2023 06:33)  Lipitor: orally once a day (03 Apr 2023 06:33)  metFORMIN:  (03 Apr 2023 06:33)    Allergies  No Known Allergies     24 Hour Events  4/12  NIGHT  -temp 100.2 @ 2000  -JANICE drain output serous  - magnesium 1.7 --> 2g magnesium sulfate repleted for hypomagnesemia  -added tylenol to pain regimen    DAY  -f/u BCx, remains on zosyn  -CLD, LR @ 50cc/hr  -losartan 100 mg started (takes 40 mg omesartan)  -hgb 8.1--> 7.5, melena x1      [X] A ten-point review of systems was otherwise negative except as noted above.  [  ] Due to altered mental status/intubation, subjective information was not attained from the patient. History was obtained, to the extent possible, from review of the chart and collateral sources of information.    =========================================================================================================================================     JEREMIAS IVEY     63y Female    MRN-[id number]    Admit Date: 04-09-23  ICU Admission Date: 4/10/23        ============================  HPI   63y F  POD #9 s/p Pancreaticoduodenectomy on 4/3/2023 and d/c on 4/8, presented for lightheadedness and recent episodes of melanotic stools (last 4/9 in afternoon). Denies fevers/chills, chest pain, shortness of breath, syncope. She states that before admission she had an episode of nausea and retching without vomiting and increasing abdominal pain.  In ED, Hgb remains at 7.2, LFTs elevated Tbili 3.3 now 1.2, Alk phos 1146, now 952. CT was unremarkable, GI c/s, EGD done 4/10 to evaluate the GJ anastomosis no active bleeding noted, old blood noted, negative for obstruction. Pt was tachycardic on the floor 110s, tmax 100.8, bolused 500cc LR, started IVF, zosyn given c/f cholangitis.   PMH  PAST MEDICAL & SURGICAL HISTORY:  HTN (hypertension)  High cholesterol  Diabetes  COPD (chronic obstructive pulmonary disease)  Kidney stones  No significant past surgical history    Home Meds:  Home Medications:  acetaminophen 325 mg oral tablet: 2 tab(s) orally every 6 hours as needed for  pain (08 Apr 2023 10:59)  Benicar: orally once a day (03 Apr 2023 06:33)  Lipitor: orally once a day (03 Apr 2023 06:33)  metFORMIN:  (03 Apr 2023 06:33)    Allergies  No Known Allergies     24 Hour Events  4/12  NIGHT  -temp 100.2 @ 2000  -JANICE drain output serous  - magnesium 1.7 --> 2g magnesium sulfate repleted for hypomagnesemia  -added tylenol to pain regimen    DAY  -f/u BCx, remains on zosyn  -CLD, LR @ 50cc/hr  -losartan 100 mg started (takes 40 mg omesartan)  -hgb 8.1--> 7.5, melena x1      [X] A ten-point review of systems was otherwise negative except as noted above.  [  ] Due to altered mental status/intubation, subjective information was not attained from the patient. History was obtained, to the extent possible, from review of the chart and collateral sources of information.    =========================================================================================================================================        Daily Height in cm: 160.02 (12 Apr 2023 14:03)    Daily   Diet, Clear Liquid:   Consistent Carbohydrate No Snacks  DASH/TLC Sodium & Cholesterol Restricted  Prosource Gelatein 20 Sugar Free     Qty per Day:  3  Supplement Feeding Modality:  Oral  Ensure Clear Cans or Servings Per Day:  1       Frequency:  Daily (04-12-23 @ 14:36)    CURRENT MEDS:  Neurologic Medications  acetaminophen     Tablet .. 650 milliGRAM(s) Oral every 6 hours PRN Mild Pain (1 - 3)  HYDROmorphone  Injectable 0.5 milliGRAM(s) IV Push every 6 hours PRN Severe Pain (7 - 10)    Respiratory Medications    Cardiovascular Medications  losartan 100 milliGRAM(s) Oral daily    Gastrointestinal Medications  lactated ringers. 1000 milliLiter(s) IV Continuous <Continuous>  pantoprazole  Injectable 40 milliGRAM(s) IV Push every 12 hours  sucralfate suspension 1 Gram(s) Oral every 6 hours    Genitourinary Medications    Hematologic/Oncologic Medications  enoxaparin Injectable 40 milliGRAM(s) SubCutaneous every 24 hours    Antimicrobial/Immunologic Medications  piperacillin/tazobactam IVPB.. 3.375 Gram(s) IV Intermittent every 8 hours    Endocrine/Metabolic Medications  insulin lispro (ADMELOG) corrective regimen sliding scale   SubCutaneous every 6 hours    Topical/Other Medications    ICU Vital Signs Last 24 Hrs  T(C): 36.1 (13 Apr 2023 04:00), Max: 37.9 (12 Apr 2023 20:00)  T(F): 97 (13 Apr 2023 04:00), Max: 100.2 (12 Apr 2023 20:00)  HR: 84 (13 Apr 2023 06:30) (72 - 91)  BP: 133/76 (13 Apr 2023 06:30) (127/69 - 154/79)  BP(mean): 99 (13 Apr 2023 06:30) (89 - 110)  ABP: --  ABP(mean): --  RR: 18 (13 Apr 2023 06:30) (12 - 26)  SpO2: 94% (13 Apr 2023 06:30) (92% - 99%)    O2 Parameters below as of 13 Apr 2023 06:00  Patient On (Oxygen Delivery Method): room air              I&O's Summary    12 Apr 2023 07:01  -  13 Apr 2023 07:00  --------------------------------------------------------  IN: 1800 mL / OUT: 2170 mL / NET: -370 mL      I&O's Detail    12 Apr 2023 07:01  -  13 Apr 2023 07:00  --------------------------------------------------------  IN:    IV PiggyBack: 200 mL    Lactated Ringers: 1200 mL    Oral Fluid: 400 mL  Total IN: 1800 mL    OUT:    Bulb (mL): 70 mL    Voided (mL): 2100 mL  Total OUT: 2170 mL    Total NET: -370 mL          PHYSICAL EXAM:     a&ox3  follows commands, MATTHEW  no acute distress  equal chest rise b/l  abdomen soft  extremities soft

## 2023-04-13 NOTE — PROGRESS NOTE ADULT - ASSESSMENT
63yFemale with hx of Pancreatic head IPMN, s/p pancreaticoduodenectomy, presenting with Postoperative pain, and melanotic stools. She is s/p EGD with findings consistent for previous bleeding, afferent and efferent limbs of the GJ patent.     Plan:  -SICU care  -LFTs downtrendin  -Monitor for fevers  -Monitor H&H  -Monitor for any blood BM  -ID, continue zosyn empirically while admitted, if discharged before 7 day course is complete, d/c on PO levaquin and flagyl   -CLD  -IVFs  Abdominal binder for comfort  -Strict I&Os, monitor left drain output and character   -Adequate pain control  -Encourage ambulation and out of bed to chair      spectra [phone]

## 2023-04-14 LAB
ALBUMIN SERPL ELPH-MCNC: 2.9 G/DL — LOW (ref 3.5–5.2)
ALP SERPL-CCNC: 636 U/L — HIGH (ref 30–115)
ALT FLD-CCNC: 86 U/L — HIGH (ref 0–41)
ANION GAP SERPL CALC-SCNC: 11 MMOL/L — SIGNIFICANT CHANGE UP (ref 7–14)
AST SERPL-CCNC: 49 U/L — HIGH (ref 0–41)
BASOPHILS # BLD AUTO: 0.01 K/UL — SIGNIFICANT CHANGE UP (ref 0–0.2)
BASOPHILS # BLD AUTO: 0.02 K/UL — SIGNIFICANT CHANGE UP (ref 0–0.2)
BASOPHILS NFR BLD AUTO: 0.2 % — SIGNIFICANT CHANGE UP (ref 0–1)
BASOPHILS NFR BLD AUTO: 0.5 % — SIGNIFICANT CHANGE UP (ref 0–1)
BILIRUB DIRECT SERPL-MCNC: 0.3 MG/DL — SIGNIFICANT CHANGE UP (ref 0–0.3)
BILIRUB INDIRECT FLD-MCNC: 0.4 MG/DL — SIGNIFICANT CHANGE UP (ref 0.2–1.2)
BILIRUB SERPL-MCNC: 0.7 MG/DL — SIGNIFICANT CHANGE UP (ref 0.2–1.2)
BUN SERPL-MCNC: 5 MG/DL — LOW (ref 10–20)
CALCIUM SERPL-MCNC: 8.7 MG/DL — SIGNIFICANT CHANGE UP (ref 8.4–10.5)
CHLORIDE SERPL-SCNC: 101 MMOL/L — SIGNIFICANT CHANGE UP (ref 98–110)
CO2 SERPL-SCNC: 28 MMOL/L — SIGNIFICANT CHANGE UP (ref 17–32)
CREAT SERPL-MCNC: 0.6 MG/DL — LOW (ref 0.7–1.5)
EGFR: 101 ML/MIN/1.73M2 — SIGNIFICANT CHANGE UP
EOSINOPHIL # BLD AUTO: 0.05 K/UL — SIGNIFICANT CHANGE UP (ref 0–0.7)
EOSINOPHIL # BLD AUTO: 0.07 K/UL — SIGNIFICANT CHANGE UP (ref 0–0.7)
EOSINOPHIL NFR BLD AUTO: 0.9 % — SIGNIFICANT CHANGE UP (ref 0–8)
EOSINOPHIL NFR BLD AUTO: 1.6 % — SIGNIFICANT CHANGE UP (ref 0–8)
GLUCOSE BLDC GLUCOMTR-MCNC: 124 MG/DL — HIGH (ref 70–99)
GLUCOSE BLDC GLUCOMTR-MCNC: 160 MG/DL — HIGH (ref 70–99)
GLUCOSE BLDC GLUCOMTR-MCNC: 165 MG/DL — HIGH (ref 70–99)
GLUCOSE BLDC GLUCOMTR-MCNC: 169 MG/DL — HIGH (ref 70–99)
GLUCOSE SERPL-MCNC: 120 MG/DL — HIGH (ref 70–99)
HCT VFR BLD CALC: 21.5 % — LOW (ref 37–47)
HCT VFR BLD CALC: 26.7 % — LOW (ref 37–47)
HGB BLD-MCNC: 6.9 G/DL — CRITICAL LOW (ref 12–16)
HGB BLD-MCNC: 8.9 G/DL — LOW (ref 12–16)
IMM GRANULOCYTES NFR BLD AUTO: 1.3 % — HIGH (ref 0.1–0.3)
IMM GRANULOCYTES NFR BLD AUTO: 1.4 % — HIGH (ref 0.1–0.3)
LIDOCAIN IGE QN: 110 U/L — HIGH (ref 7–60)
LYMPHOCYTES # BLD AUTO: 0.85 K/UL — LOW (ref 1.2–3.4)
LYMPHOCYTES # BLD AUTO: 1.04 K/UL — LOW (ref 1.2–3.4)
LYMPHOCYTES # BLD AUTO: 15.4 % — LOW (ref 20.5–51.1)
LYMPHOCYTES # BLD AUTO: 23.9 % — SIGNIFICANT CHANGE UP (ref 20.5–51.1)
MAGNESIUM SERPL-MCNC: 1.9 MG/DL — SIGNIFICANT CHANGE UP (ref 1.8–2.4)
MCHC RBC-ENTMCNC: 29.5 PG — SIGNIFICANT CHANGE UP (ref 27–31)
MCHC RBC-ENTMCNC: 30 PG — SIGNIFICANT CHANGE UP (ref 27–31)
MCHC RBC-ENTMCNC: 32.1 G/DL — SIGNIFICANT CHANGE UP (ref 32–37)
MCHC RBC-ENTMCNC: 33.3 G/DL — SIGNIFICANT CHANGE UP (ref 32–37)
MCV RBC AUTO: 89.9 FL — SIGNIFICANT CHANGE UP (ref 81–99)
MCV RBC AUTO: 91.9 FL — SIGNIFICANT CHANGE UP (ref 81–99)
MONOCYTES # BLD AUTO: 0.36 K/UL — SIGNIFICANT CHANGE UP (ref 0.1–0.6)
MONOCYTES # BLD AUTO: 0.43 K/UL — SIGNIFICANT CHANGE UP (ref 0.1–0.6)
MONOCYTES NFR BLD AUTO: 6.5 % — SIGNIFICANT CHANGE UP (ref 1.7–9.3)
MONOCYTES NFR BLD AUTO: 9.9 % — HIGH (ref 1.7–9.3)
NEUTROPHILS # BLD AUTO: 2.74 K/UL — SIGNIFICANT CHANGE UP (ref 1.4–6.5)
NEUTROPHILS # BLD AUTO: 4.18 K/UL — SIGNIFICANT CHANGE UP (ref 1.4–6.5)
NEUTROPHILS NFR BLD AUTO: 62.7 % — SIGNIFICANT CHANGE UP (ref 42.2–75.2)
NEUTROPHILS NFR BLD AUTO: 75.7 % — HIGH (ref 42.2–75.2)
NRBC # BLD: 0 /100 WBCS — SIGNIFICANT CHANGE UP (ref 0–0)
NRBC # BLD: 0 /100 WBCS — SIGNIFICANT CHANGE UP (ref 0–0)
PHOSPHATE SERPL-MCNC: 3 MG/DL — SIGNIFICANT CHANGE UP (ref 2.1–4.9)
PLATELET # BLD AUTO: 408 K/UL — HIGH (ref 130–400)
PLATELET # BLD AUTO: 410 K/UL — HIGH (ref 130–400)
PMV BLD: 9.5 FL — SIGNIFICANT CHANGE UP (ref 7.4–10.4)
PMV BLD: 9.7 FL — SIGNIFICANT CHANGE UP (ref 7.4–10.4)
POTASSIUM SERPL-MCNC: 3.5 MMOL/L — SIGNIFICANT CHANGE UP (ref 3.5–5)
POTASSIUM SERPL-SCNC: 3.5 MMOL/L — SIGNIFICANT CHANGE UP (ref 3.5–5)
PROT SERPL-MCNC: 5.6 G/DL — LOW (ref 6–8)
RBC # BLD: 2.34 M/UL — LOW (ref 4.2–5.4)
RBC # BLD: 2.97 M/UL — LOW (ref 4.2–5.4)
RBC # FLD: 13.4 % — SIGNIFICANT CHANGE UP (ref 11.5–14.5)
RBC # FLD: 15.1 % — HIGH (ref 11.5–14.5)
SODIUM SERPL-SCNC: 140 MMOL/L — SIGNIFICANT CHANGE UP (ref 135–146)
WBC # BLD: 4.36 K/UL — LOW (ref 4.8–10.8)
WBC # BLD: 5.52 K/UL — SIGNIFICANT CHANGE UP (ref 4.8–10.8)
WBC # FLD AUTO: 4.36 K/UL — LOW (ref 4.8–10.8)
WBC # FLD AUTO: 5.52 K/UL — SIGNIFICANT CHANGE UP (ref 4.8–10.8)

## 2023-04-14 PROCEDURE — 71045 X-RAY EXAM CHEST 1 VIEW: CPT | Mod: 26

## 2023-04-14 PROCEDURE — 99233 SBSQ HOSP IP/OBS HIGH 50: CPT | Mod: 24

## 2023-04-14 PROCEDURE — 93010 ELECTROCARDIOGRAM REPORT: CPT

## 2023-04-14 PROCEDURE — 99233 SBSQ HOSP IP/OBS HIGH 50: CPT

## 2023-04-14 RX ORDER — IRON SUCROSE 20 MG/ML
100 INJECTION, SOLUTION INTRAVENOUS ONCE
Refills: 0 | Status: COMPLETED | OUTPATIENT
Start: 2023-04-14 | End: 2023-04-14

## 2023-04-14 RX ORDER — CHLORHEXIDINE GLUCONATE 213 G/1000ML
1 SOLUTION TOPICAL
Refills: 0 | Status: DISCONTINUED | OUTPATIENT
Start: 2023-04-14 | End: 2023-04-17

## 2023-04-14 RX ADMIN — Medication 650 MILLIGRAM(S): at 07:51

## 2023-04-14 RX ADMIN — Medication 2: at 11:43

## 2023-04-14 RX ADMIN — HYDROMORPHONE HYDROCHLORIDE 0.5 MILLIGRAM(S): 2 INJECTION INTRAMUSCULAR; INTRAVENOUS; SUBCUTANEOUS at 20:43

## 2023-04-14 RX ADMIN — Medication 650 MILLIGRAM(S): at 19:00

## 2023-04-14 RX ADMIN — PIPERACILLIN AND TAZOBACTAM 25 GRAM(S): 4; .5 INJECTION, POWDER, LYOPHILIZED, FOR SOLUTION INTRAVENOUS at 05:47

## 2023-04-14 RX ADMIN — Medication 1 GRAM(S): at 23:56

## 2023-04-14 RX ADMIN — ENOXAPARIN SODIUM 40 MILLIGRAM(S): 100 INJECTION SUBCUTANEOUS at 05:45

## 2023-04-14 RX ADMIN — Medication 1 GRAM(S): at 18:20

## 2023-04-14 RX ADMIN — PANTOPRAZOLE SODIUM 40 MILLIGRAM(S): 20 TABLET, DELAYED RELEASE ORAL at 05:45

## 2023-04-14 RX ADMIN — SODIUM CHLORIDE 50 MILLILITER(S): 9 INJECTION, SOLUTION INTRAVENOUS at 14:08

## 2023-04-14 RX ADMIN — Medication 650 MILLIGRAM(S): at 11:43

## 2023-04-14 RX ADMIN — Medication 2: at 16:56

## 2023-04-14 RX ADMIN — PIPERACILLIN AND TAZOBACTAM 25 GRAM(S): 4; .5 INJECTION, POWDER, LYOPHILIZED, FOR SOLUTION INTRAVENOUS at 14:08

## 2023-04-14 RX ADMIN — Medication 2: at 06:38

## 2023-04-14 RX ADMIN — IRON SUCROSE 210 MILLIGRAM(S): 20 INJECTION, SOLUTION INTRAVENOUS at 16:29

## 2023-04-14 RX ADMIN — LOSARTAN POTASSIUM 100 MILLIGRAM(S): 100 TABLET, FILM COATED ORAL at 05:45

## 2023-04-14 RX ADMIN — Medication 650 MILLIGRAM(S): at 12:10

## 2023-04-14 RX ADMIN — Medication 1 GRAM(S): at 05:48

## 2023-04-14 RX ADMIN — Medication 1 GRAM(S): at 00:06

## 2023-04-14 RX ADMIN — PANTOPRAZOLE SODIUM 40 MILLIGRAM(S): 20 TABLET, DELAYED RELEASE ORAL at 18:20

## 2023-04-14 RX ADMIN — HYDROMORPHONE HYDROCHLORIDE 0.5 MILLIGRAM(S): 2 INJECTION INTRAMUSCULAR; INTRAVENOUS; SUBCUTANEOUS at 21:12

## 2023-04-14 RX ADMIN — Medication 650 MILLIGRAM(S): at 18:20

## 2023-04-14 RX ADMIN — PIPERACILLIN AND TAZOBACTAM 25 GRAM(S): 4; .5 INJECTION, POWDER, LYOPHILIZED, FOR SOLUTION INTRAVENOUS at 21:11

## 2023-04-14 RX ADMIN — Medication 1 GRAM(S): at 11:43

## 2023-04-14 RX ADMIN — Medication 650 MILLIGRAM(S): at 23:56

## 2023-04-14 NOTE — PROGRESS NOTE ADULT - SUBJECTIVE AND OBJECTIVE BOX
GENERAL SURGERY PROGRESS NOTE    Patient: JEREMIAS IVEY , 63y ([birth year])Female   MRN: [id number]  Location: [address]  Visit: 04-09-23 Inpatient  Date: 04-14-23 @ 10:00      Procedure/Dx/Injuries: s/p pancreaticoduodenectomy, with abdominal pain and melenic stools    Events of past 24 hours: +gas, +bm, tolerating diet, no more melena noted, pain well controlled, ambulating        PAST MEDICAL & SURGICAL HISTORY:  HTN (hypertension)      High cholesterol      Diabetes      COPD (chronic obstructive pulmonary disease)      Kidney stones      No significant past surgical history          Vitals:   T(F): 98 (04-14-23 @ 08:00), Max: 98.6 (04-13-23 @ 11:49)  HR: 73 (04-14-23 @ 09:00)  BP: 150/70 (04-14-23 @ 09:00)  RR: 14 (04-14-23 @ 09:00)  SpO2: 93% (04-14-23 @ 09:00)      Diet, Clear Liquid:   Consistent Carbohydrate No Snacks  DASH/TLC Sodium & Cholesterol Restricted  Prosource Gelatein 20 Sugar Free     Qty per Day:  3  Supplement Feeding Modality:  Oral  Ensure Clear Cans or Servings Per Day:  1       Frequency:  Daily      Fluids:     I & O's:    04-13-23 @ 07:01  -  04-14-23 @ 07:00  --------------------------------------------------------  IN:    Lactated Ringers: 1200 mL    Oral Fluid: 720 mL  Total IN: 1920 mL    OUT:    Bulb (mL): 20 mL    Voided (mL): 1850 mL  Total OUT: 1870 mL    Total NET: 50 mL          PHYSICAL EXAM:  General Appearance: AAOX3, NAD  Heart: RRR  Lungs: CTAX2  Abdomen:  soft, non tender, post surgical wound w clean wound dressing in place, JANICE drain w SS output  MSK/Extremities:  mobile    MEDICATIONS  (STANDING):  acetaminophen     Tablet .. 650 milliGRAM(s) Oral every 6 hours  chlorhexidine 2% Cloths 1 Application(s) Topical <User Schedule>  enoxaparin Injectable 40 milliGRAM(s) SubCutaneous every 24 hours  insulin lispro (ADMELOG) corrective regimen sliding scale   SubCutaneous Before meals and at bedtime  lactated ringers. 1000 milliLiter(s) (50 mL/Hr) IV Continuous <Continuous>  losartan 100 milliGRAM(s) Oral daily  pantoprazole  Injectable 40 milliGRAM(s) IV Push every 12 hours  piperacillin/tazobactam IVPB.. 3.375 Gram(s) IV Intermittent every 8 hours  sucralfate suspension 1 Gram(s) Oral every 6 hours    MEDICATIONS  (PRN):  HYDROmorphone  Injectable 0.5 milliGRAM(s) IV Push every 6 hours PRN Severe Pain (7 - 10)      DVT PROPHYLAXIS: enoxaparin Injectable 40 milliGRAM(s) SubCutaneous every 24 hours    GI PROPHYLAXIS: pantoprazole  Injectable 40 milliGRAM(s) IV Push every 12 hours    ANTICOAGULATION:   ANTIBIOTICS:  piperacillin/tazobactam IVPB.. 3.375 Gram(s)            LAB/STUDIES:  Labs:  CAPILLARY BLOOD GLUCOSE      POCT Blood Glucose.: 165 mg/dL (14 Apr 2023 06:33)  POCT Blood Glucose.: 134 mg/dL (13 Apr 2023 21:37)  POCT Blood Glucose.: 180 mg/dL (13 Apr 2023 15:57)  POCT Blood Glucose.: 164 mg/dL (13 Apr 2023 11:36)                          7.5    6.17  )-----------( 317      ( 13 Apr 2023 21:40 )             23.4       Auto Neutrophil %: 76.7 % (04-13-23 @ 21:40)  Auto Immature Granulocyte %: 1.3 % (04-13-23 @ 21:40)    04-13    140  |  99  |  5<L>  ----------------------------<  135<H>  4.0   |  27  |  0.6<L>      Calcium, Total Serum: 8.6 mg/dL (04-13-23 @ 21:40)      LFTs:             5.9  | 0.7  | 36       ------------------[646     ( 13 Apr 2023 21:40 )  3.0  | 0.4  | 113         Lipase:214    Amylase:x             Coags:                Culture - Blood (collected 11 Apr 2023 13:02)  Source: .Blood Blood-Peripheral  Preliminary Report (12 Apr 2023 23:01):    No growth to date.    Culture - Blood (collected 11 Apr 2023 11:00)  Source: .Blood Blood-Peripheral  Preliminary Report (12 Apr 2023 23:01):    No growth to date.              IMAGING:      ACCESS/ DEVICES:  [x ] Peripheral IV  [ ] Central Venous Line	[ ] R	[ ] L	[ ] IJ	[ ] Fem	[ ] SC	Placed:   [ ] Arterial Line		[ ] R	[ ] L	[ ] Fem	[ ] Rad	[ ] Ax	Placed:   [ ] PICC:					[ ] Mediport  [ ] Urinary Catheter,  Date Placed:   [ ] Chest tube: [ ] Right, [ ] Left  [ ] JANICE/Emmett Drains

## 2023-04-14 NOTE — PROGRESS NOTE ADULT - ASSESSMENT
3 y.o F w/ Hx of HTN, HLD, DM, and hx of MD-IPMN s/p pylorus-preserving Whipple (Pancreaticoduodenectomy) on 4/3/2023 (low-grade intestinal type w/ no HGD or malignancy), presenting for pre-syncope and reported episodes of black stools, initially evaluated for concern of UGIB, being followed.    # Melena  # Recent Pylorus-preserving Whipple Pancreaticoduodenectomy   - patient is hemodynamically stable  - labs reviewed  - CT scan reviewed  - prior EGD/EUS and pathology reports reviewed   - s/p EGD 4/10 revealing a marginal ulcer at the duodenojejunal anastomosis, and multiple small clean based gastric ulcers (please refer to the report in sunrise for full details)   - drop Hb to 6.9 today with reported 1 episode of Melena     recommendations:  - diet per primary team   - IV PPI BID    - Active type and screen  - monitor for signs of GI bleeding   - monitor Hb daily and transfuse to keep > 7  - will plan for EGD on Monday or earlier on emergency bases     #abd pain worse at night time   most likely post-operative pain  tolerating PO diet but reports increasing pain at night   conservative management and supportive care for now given overall clinical improvement  -will monitor pain and if persists, may consider repeat imaging    # Elevated liver enzymes: improving   - likely due to edema at the hepaticojejunostomy vs ? 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    # Melena  # Recent Pylorus-preserving Whipple Pancreaticoduodenectomy   - patient is hemodynamically stable  - labs reviewed  - CT scan reviewed  - prior EGD/EUS and pathology reports reviewed   - s/p EGD 4/10 revealing a marginal ulcer at the duodenojejunal anastomosis, and multiple small clean based gastric ulcers (please refer to the report in sunrise for full details)   - drop Hb to 6.9 today with reported 1 episode of Melena     recommendations:  - diet per primary team   - IV PPI BID    - Active type and screen  - monitor for signs of GI bleeding   - monitor Hb daily and transfuse to keep > 7  - will plan for repeat EGD early next week or earlier on emergency bases     #abd pain worse at night time   - most likely post-operative pain  - tolerating PO diet but reports increasing pain at night   - conservative management and supportive care for now given overall clinical improvement  - will monitor pain and if persists, may consider repeat imaging    # Elevated liver enzymes: improving   - likely due to edema at the hepaticojejunostomy vs ? occlusion from a clot in the setting of bleed at the hepaticojejunostomy  - labs reviewed   - acute hepatitis panel is negative     recommendations:  - trend LFTs  - get US RUQ w/ doppler  - avoid hepatotoxic agents

## 2023-04-14 NOTE — PROGRESS NOTE ADULT - ASSESSMENT
ASSESSMENT  63y F  POD #8 s/p Pancreaticoduodenectomy on 4/3/2023 and d/c on 4/8, presented for lightheadedness and recent episodes of melanotic stools (last 4/9 in afternoon). CTAP showing expected post-operative changes. Afebrile. ID consulted to continue home ertapenem, As per daughter, patient is not taking Ertapenem (or any other ABx) at home.    IMPRESSION  #Post EGD fever    4/1  BCX NGTD x2    EDG 4/10: Multiple small (<1 cm) clean based ulcers in the antrum. A marginal, circumferential anastomotic ulcer measuring around 3 cm was seen at the duodeno-jejunotomy with surrounding edema, stigmata of recent bleeding, and visible surgical sutures. There was no evidence of active bleeding. The efferent limb was examined and had no obstruction but had multiple small (< 1 cm) clean based ulcers. The EGD scope was then exchanged to a PCF colonoscope and the afferent limb was examined with bile seen and no evidence of bleeding. The plastic CBD stent was noted to be in position.  #UGIB  #s/p Pancreaticoduodenectomy for pancreatic mass 4/3  #Sepsis rule out on admission  Creatinine, Serum: 0.5 mg/dL (04.11.23 @ 21:22)      RECOMMENDATIONS  - Zosyn  3.375 Gram(s) IV Intermittent every 8 hours, likely empiric 7 days if BCX NG, on D/C PO levaquin 500mg daily + flagyl PO 500mg TID end 4/16    If any questions, please call or send a message on [employer] Teams  Please continue to update ID with any pertinent new laboratory or radiographic findings  Spectra [id number]  ASSESSMENT  63y F  POD #8 s/p Pancreaticoduodenectomy on 4/3/2023 and d/c on 4/8, presented for lightheadedness and recent episodes of melanotic stools (last 4/9 in afternoon). CTAP showing expected post-operative changes. Afebrile. ID consulted to continue home ertapenem, As per daughter, patient is not taking Ertapenem (or any other ABx) at home.    IMPRESSION  #Post EGD fever    4/1  BCX NGTD x2    EDG 4/10: Multiple small (<1 cm) clean based ulcers in the antrum. A marginal, circumferential anastomotic ulcer measuring around 3 cm was seen at the duodeno-jejunotomy with surrounding edema, stigmata of recent bleeding, and visible surgical sutures. There was no evidence of active bleeding. The efferent limb was examined and had no obstruction but had multiple small (< 1 cm) clean based ulcers. The EGD scope was then exchanged to a PCF colonoscope and the afferent limb was examined with bile seen and no evidence of bleeding. The plastic CBD stent was noted to be in position.  #UGIB  #s/p Pancreaticoduodenectomy for pancreatic mass 4/3  #Sepsis rule out on admission  Creatinine, Serum: 0.5 mg/dL (04.11.23 @ 21:22)      RECOMMENDATIONS  - Zosyn  3.375 Gram(s) IV Intermittent every 8 hours, likely empiric 7 days if BCX NG, on D/C PO levaquin 500mg daily + flagyl PO 500mg TID end 4/16  - Please recall ID PRN. Please inform ID of any patient clinical change or any new pertinent laboratory or radiographic data     If any questions, please call or send a message on [employer] Teams  Please continue to update ID with any pertinent new laboratory or radiographic findings  Spectra [id number]

## 2023-04-14 NOTE — PROGRESS NOTE ADULT - ATTENDING COMMENTS
I edited the note.   Time-based billing (NON-critical care).   35 minutes spent on total encounter; more than 50% of the visit was spent counseling and / or coordinating care by the attending physician.  The necessity of the time spent during the encounter on this date of service was due to: Coordination of care.
s/p pylorus preserving pancreaticoduodenectomy  readmitted for obstructive jaundice and bleeding  repeat lft no improvement  but the next one improved  egd one  no obstruction  ulcers in stomach   efferent limb  and anastomosis  no acative bleeding    HJ stent seen  PJ stent not seen  abdomen soft non tender  last bm olivier  JANICE drain sero sang  will monitor labs , h/h transfuse as needed
s/p pylorus preserving pancreaticoduodenectomy  readmitted for obstructive jaundice and bleeding  repeat lft no improvement  but the next one improved  egd one  no obstruction  ulcers in stomach   effernt limb  and anatamosis  no acative bleeding    HJ stent seen  PJ stent not seen  abdomen soft non tender  last bm olivier  JANICE drain sero sang  will monitor labs , h/h transfuse as needed
agree w/ above - pt clinically improving but still c/o upper mid abd pain that is mostly post-prandial.  could be post-op gastroparesis vs 2/2 PUD.   she had a drop in Hb to 6.9 s/p 1 u PRBC but no clinically significant bleeding.   d/w Dr. Blanco and will repeat EGD early next week (Mon vs Tue) prior to d/c - will likely get repeat CT prior to discharge.    Time-based billing (NON-critical care).   35 minutes spent on total encounter; more than 50% of the visit was spent counseling and / or coordinating care by the attending physician.  The necessity of the time spent during the encounter on this date of service was due to: Coordination of care.
received a unit of PC. appropriate response. on zosyn. good urine output. follow closely.
s/p pylorus preserving pancreaticoduodenectomy  readmitted for obstructive jaundice and bleeding  repeat lft no improvement  but the next one improved  EGD done  no obstruction  ulcers in stomach   efferent limb  and anatomists  no active bleeding  HJ stent seen  PJ stent not seen  abdomen soft non tender  last bm olivier small  had one unit prbc transfused  JANICE drain sero sang    since yesterday   pain improving  diet causing pain   small dark bm  hb drifted down   will get 1 more unit  will monitor labs   tolerating clear liquid diet  if stable can advance diet
s/p pylorus preserving pancreaticoduodenectomy  readmitted for obstructive jaundice and bleeding  repeat lft no improvement  but the next one improved  EGD one  no obstruction  ulcers in stomach   efferent limb  and anatomists  no active bleeding    HJ stent seen  PJ stent not seen  abdomen soft non tender  last bm olivier small  had one unit prbc transfused  JANICE drain sero sang  will monitor labs   tolerating clear liquid diet  if stable can advance diet
s/p pylorus preserving pancreaticoduodenectomy  readmitted for obstructive jaundice and bleeding  repeat lft no improvement  but the next one improved  egd one  no obstruction  ulcers in stomach   efferent limb  and anatomists  no active bleeding    HJ stent seen  PJ stent not seen  abdomen soft non tender  last bm olivier small  had one unit prbc transfused  JANICE drain sero sang  will monitor labs   tolerating clear liquid diet  if stable can advance diet
vitals stable. adequate urine output. follow hematocrit. pn zosyn. BG ok. clears po.
I edited the note.   Time-based billing (NON-critical care).   35 minutes spent on total encounter; more than 50% of the visit was spent counseling and / or coordinating care by the attending physician.    The necessity of the time spent during the encounter on this date of service was due to: Coordination of care.

## 2023-04-14 NOTE — CHART NOTE - NSCHARTNOTEFT_GEN_A_CORE
SICU Transfer Note        Transfer from: SICU  Transfer to: General Surgery (blue team)  Accepting physician: Francis  S/o: Kathryn          HPI   63y F  POD #9 s/p Pancreaticoduodenectomy on 4/3/2023 and d/c on 4/8, presented for lightheadedness and recent episodes of melanotic stools (last 4/9 in afternoon). Denies fevers/chills, chest pain, shortness of breath, syncope. She states that before admission she had an episode of nausea and retching without vomiting and increasing abdominal pain.  In ED, Hgb remains at 7.2, LFTs elevated Tbili 3.3 now 1.2, Alk phos 1146, now 952. CT was unremarkable, GI c/s, EGD done 4/10 to evaluate the GJ anastomosis no active bleeding noted, old blood noted, negative for obstruction. Pt was tachycardic on the floor 110s, tmax 100.8, bolused 500cc LR, started IVF, zosyn given c/f cholangitis.   PMH  PAST MEDICAL & SURGICAL HISTORY:  HTN (hypertension)  High cholesterol  Diabetes  COPD (chronic obstructive pulmonary disease)  Kidney stones  No significant past surgical history          SICU COURSE:  The patient was upgraded to the unit for concern for ascending cholangitis in the context of rising LFTs and bilirubin. Amylase, Lipase, and TB were initially elevated and peaked around 4/12, and has since been downtrending. BCx with no growth. She was started on Zosyn, which per ID recs, will be continued until 4/16 (total of 7 days) and can be transitioned to Levofloxacin and metronidazole at discharge to complete this duration.    For melena, an EGD was done and demonstrated a fresh ulcer without active bleeding, no intervention required. She received 1U PRBC ordered 4/11 overnight (completed 4/12) and has since had stable Hgb. She is still passing small volume melena, in the context of limited BM on a CLD and held bowel regimen. She continues to have post prandial "discomfort" limiting her appetite, but otherwise is not having mild abdominal pain.   The patient has been having normal hemodynamics, no s/s of end-organ dysfunction. Her great activity level is at baseline, and walks laps around the unit every few hours without difficulty.       ASSESSMENT & PLAN:   Assessment & Plan  63y Female w/ hx of IPMN s/p pylorus-preserving Whipple (4/3/23), melanotic stool, tachy, febrile, upgrade for possible cholangitis    NEURO:  #Acute pain    -APAP 650mg q6hrs     -Inpatient Rx: HYDROmorphone  Injectable 0.5 milliGRAM(s) IV Push every 6 hours PRN    RESP:   #Oxygen insufficiency     -wean off NC to RA as tolerated    -encourage IS     CARDS:   #HTN    -Rx-losartan 100 daily (held)  #Imaging/Labs    -EKG (4/9): NSR; inferior infarct age indeterminate  - Mild chest pressure overnight, EKG this AM without concern for ACS, nml interval changes (except DC 150ms, no signs of higher level heart block)    GI/NUTR:   #POD #8 s/p Whipple, RLQ JANICE, prevena in place   #Elevated LFTs (downtrending)    #Melanotic stool    -GI following, s/p EGD, if any more melanotic stool will re-eval the HJ anastomosis     -EDG 4/10: Multiple small (<1 cm) clean based ulcers in the antrum. A marginal, circumferential anastomotic ulcer measuring around 3 cm was seen at the duodeno-jejunotomy with surrounding edema, stigmata of recent bleeding, and visible surgical sutures. There was no evidence of active bleeding. The efferent limb was examined and had no obstruction but had multiple small (< 1  cm) clean based ulcers. The EGD scope was then exchanged to a PCF colonoscope and the afferent limb was examined with bile seen and no evidence of bleeding. The plastic CBD stent was noted to be in position.    -Monitor Hgb    -PPI bid, carafate   #Diet, CLD    -aspiration precautions, HOB 30  #GI Prophylaxis    -PPI BID  #Bowel regimen- held    -Very small volume melena on 4/12 and then 4/14.     /RENAL:   #urine output in critically ill    -voiding    LR @50cc, IV lock when pt takes sufficient PO intake    Labs:          BUN/Cr- 6/0.5  -->,  5/0.5  -->          Electrolytes-Na 138 // K 4.1 // Mg 1.7 //  Phos 3.7 (04-12 @ 21:15)  #maintain euvolemia    HEME/ONC:   #DVT prophylaxis    -lovenox 40mg daily, SCDs    Labs: Hb/Hct:  7.5/22.6  -->,  7.4/22.2  -->                      Plts:  289  -->,  321  -->                 PTT/INR:     T&S Expires: 4/14  Blood Consent in chart   #DVT prophylaxis    -enoxaparin Injectable    ID:  #leukocytosis     -WBC- 9.49  --->>,  9.01  --->>,  9.62  --->>    -Temp trend- 24hrs T(F): 99.2 (04-12 @ 23:20), Max: 100.2 (04-12 @ 20:00)    -Antibiotics-piperacillin/tazobactam IVPB.. 3.375 every 8 hours    -ID following, recommending tx until 4/16 (transition to po Levofloxacin and Metronidazole at discharge).    -Cultures: NGTD   (collected 04-11 @ 13:02)  Source: .Blood Blood-Peripheral  Preliminary Report:  No growth to date.     (collected 04-11 @ 11:00)  Source: .Blood Blood-Peripheral  Preliminary Report: No growth to date.    ENDO:    -FSG q6 if NPO or Tube feeds    -Glucose goal 140-180    -if above 180 start ISS     -HA1C (3/13): 7.1    MSK:     Activity - Ambulate as Tolerated:     Time/Priority:  Routine (04-09-23 @ 18:46)  Patient walking multiple laps around unit w/o difficulty    LINES/DRAINS:  PIV    ADVANCED DIRECTIVES:  Full Code    HCP/Emergency Contact-      DISPO: Downgrade to floor          For Follow-Up:      Vital Signs Last 24 Hrs  T(C): 36.7 (14 Apr 2023 08:00), Max: 37 (13 Apr 2023 11:49)  T(F): 98 (14 Apr 2023 08:00), Max: 98.6 (13 Apr 2023 11:49)  HR: 73 (14 Apr 2023 09:00) (70 - 95)  BP: 150/70 (14 Apr 2023 09:00) (125/74 - 158/76)  BP(mean): 100 (14 Apr 2023 09:00) (91 - 116)  RR: 14 (14 Apr 2023 09:00) (7 - 26)  SpO2: 93% (14 Apr 2023 09:00) (91% - 99%)    Parameters below as of 14 Apr 2023 09:00  Patient On (Oxygen Delivery Method): room air      I&O's Summary    13 Apr 2023 07:01  -  14 Apr 2023 07:00  --------------------------------------------------------  IN: 1920 mL / OUT: 1870 mL / NET: 50 mL    14 Apr 2023 07:01  -  14 Apr 2023 11:03  --------------------------------------------------------  IN: 390 mL / OUT: 5 mL / NET: 385 mL          MEDICATIONS  (STANDING):  acetaminophen     Tablet .. 650 milliGRAM(s) Oral every 6 hours  chlorhexidine 2% Cloths 1 Application(s) Topical <User Schedule>  enoxaparin Injectable 40 milliGRAM(s) SubCutaneous every 24 hours  insulin lispro (ADMELOG) corrective regimen sliding scale   SubCutaneous Before meals and at bedtime  lactated ringers. 1000 milliLiter(s) (50 mL/Hr) IV Continuous <Continuous>  losartan 100 milliGRAM(s) Oral daily  pantoprazole  Injectable 40 milliGRAM(s) IV Push every 12 hours  piperacillin/tazobactam IVPB.. 3.375 Gram(s) IV Intermittent every 8 hours  sucralfate suspension 1 Gram(s) Oral every 6 hours    MEDICATIONS  (PRN):  HYDROmorphone  Injectable 0.5 milliGRAM(s) IV Push every 6 hours PRN Severe Pain (7 - 10)        LABS                                            7.5                   Neurophils% (auto):   76.7   (04-13 @ 21:40):    6.17 )-----------(317          Lymphocytes% (auto):  14.3                                          23.4                   Eosinphils% (auto):   1.0      Manual%: Neutrophils x    ; Lymphocytes x    ; Eosinophils x    ; Bands%: x    ; Blasts x                                    140    |  99     |  5                   Calcium: 8.6   / iCa: x      (04-13 @ 21:40)    ----------------------------<  135       Magnesium: 2.0                              4.0     |  27     |  0.6              Phosphorous: 3.3      TPro  5.9    /  Alb  3.0    /  TBili  0.7    /  DBili  0.4    /  AST  36     /  ALT  113    /  AlkPhos  646    13 Apr 2023 21:40

## 2023-04-14 NOTE — PROGRESS NOTE ADULT - SUBJECTIVE AND OBJECTIVE BOX
JEREMIAS IVEY  63y, Female  Allergy: No Known Allergies      LOS  5d    CHIEF COMPLAINT: S/P whipple with melena (14 Apr 2023 02:24)      INTERVAL EVENTS/HPI  - No acute events overnight  - T(F): , Max: 98.6 (04-13-23 @ 11:49)  - Tolerating medication  - WBC Count: 6.17 (04-13-23 @ 21:40)  WBC Count: 8.16 (04-13-23 @ 11:37)     - Creatinine, Serum: 0.6 (04-13-23 @ 21:40)  Creatinine, Serum: 0.5 (04-12-23 @ 21:15)       ROS  ***    VITALS:  T(F): 98, Max: 98.6 (04-13-23 @ 11:49)  HR: 72  BP: 155/75  RR: 17Vital Signs Last 24 Hrs  T(C): 36.7 (14 Apr 2023 00:00), Max: 37 (13 Apr 2023 11:49)  T(F): 98 (14 Apr 2023 00:00), Max: 98.6 (13 Apr 2023 11:49)  HR: 72 (14 Apr 2023 04:00) (70 - 95)  BP: 155/75 (14 Apr 2023 04:00) (127/67 - 158/76)  BP(mean): 104 (14 Apr 2023 04:00) (91 - 109)  RR: 17 (14 Apr 2023 04:00) (7 - 26)  SpO2: 99% (14 Apr 2023 04:00) (91% - 99%)    Parameters below as of 14 Apr 2023 00:00  Patient On (Oxygen Delivery Method): nasal cannula  O2 Flow (L/min): 3      PHYSICAL EXAM:  ***    FH: Non-contributory  Social Hx: Non-contributory    TESTS & MEASUREMENTS:                        7.5    6.17  )-----------( 317      ( 13 Apr 2023 21:40 )             23.4     04-13    140  |  99  |  5<L>  ----------------------------<  135<H>  4.0   |  27  |  0.6<L>    Ca    8.6      13 Apr 2023 21:40  Phos  3.3     04-13  Mg     2.0     04-13    TPro  5.9<L>  /  Alb  3.0<L>  /  TBili  0.7  /  DBili  0.4<H>  /  AST  36  /  ALT  113<H>  /  AlkPhos  646<H>  04-13      LIVER FUNCTIONS - ( 13 Apr 2023 21:40 )  Alb: 3.0 g/dL / Pro: 5.9 g/dL / ALK PHOS: 646 U/L / ALT: 113 U/L / AST: 36 U/L / GGT: x               Culture - Blood (collected 04-11-23 @ 13:02)  Source: .Blood Blood-Peripheral  Preliminary Report (04-12-23 @ 23:01):    No growth to date.    Culture - Blood (collected 04-11-23 @ 11:00)  Source: .Blood Blood-Peripheral  Preliminary Report (04-12-23 @ 23:01):    No growth to date.    Culture - Body Fluid with Gram Stain (collected 04-03-23 @ 12:40)  Source: .Body Fluid None  Gram Stain (04-04-23 @ 05:15):    No polymorphonuclear cells seen    No organisms seen    by cytocentrifuge  Final Report (04-09-23 @ 09:22):    No growth at 5 days            INFECTIOUS DISEASES TESTING  COVID-19 PCR: NotDetec (04-09-23 @ 15:07)  strept    INFLAMMATORY MARKERS      RADIOLOGY & ADDITIONAL TESTS:  I have personally reviewed the last available Chest xray  CXR      CT      CARDIOLOGY TESTING  12 Lead ECG:   Ventricular Rate 81 BPM    Atrial Rate 81 BPM    P-R Interval 154 ms    QRS Duration 76 ms    Q-T Interval 386 ms    QTC Calculation(Bazett) 448 ms    P Axis 48 degrees    R Axis 6 degrees    T Axis 14 degrees    Diagnosis Line Normal sinus rhythm  Normal ECG    Confirmed by Diana Aguilar ([id number]) on 4/13/2023 3:13:31 PM (04-12-23 @ 06:17)  12 Lead ECG:   Ventricular Rate 98 BPM    Atrial Rate 98 BPM    P-R Interval 144 ms    QRS Duration 78 ms    Q-T Interval 348 ms    QTC Calculation(Bazett) 444 ms    P Axis 37 degrees    R Axis 0 degrees    T Axis 1 degrees    Diagnosis Line Normal sinus rhythm  Inferior infarct , age undetermined  Abnormal ECG    Confirmed by Elisha Moses MD ([id number]) on 4/10/2023 8:29:51 AM (04-09-23 @ 17:46)      MEDICATIONS  acetaminophen     Tablet .. 650 Oral every 6 hours  enoxaparin Injectable 40 SubCutaneous every 24 hours  insulin lispro (ADMELOG) corrective regimen sliding scale  SubCutaneous Before meals and at bedtime  lactated ringers. 1000 IV Continuous <Continuous>  losartan 100 Oral daily  pantoprazole  Injectable 40 IV Push every 12 hours  piperacillin/tazobactam IVPB.. 3.375 IV Intermittent every 8 hours  sucralfate suspension 1 Oral every 6 hours      WEIGHT  Weight (kg): 68.9 (04-10-23 @ 12:07)  Creatinine, Serum: 0.6 mg/dL (04-13-23 @ 21:40)      ANTIBIOTICS:  piperacillin/tazobactam IVPB.. 3.375 Gram(s) IV Intermittent every 8 hours      All available historical records have been reviewed       JEREMIAS IVYE  63y, Female  Allergy: No Known Allergies      LOS  5d    CHIEF COMPLAINT: S/P whipple with melena (14 Apr 2023 02:24)      INTERVAL EVENTS/HPI  - No acute events overnight, daughter translated via phone  - had a severe abd pain overnight and felt like her heart stopped, now feeling well   - T(F): , Max: 98.6 (04-13-23 @ 11:49)  - Tolerating medication  - WBC Count: 6.17 (04-13-23 @ 21:40)  WBC Count: 8.16 (04-13-23 @ 11:37)     - Creatinine, Serum: 0.6 (04-13-23 @ 21:40)  Creatinine, Serum: 0.5 (04-12-23 @ 21:15)       ROS  General: Denies rigors, nightsweats  HEENT: Denies headache, rhinorrhea, sore throat, eye pain  CV: Denies CP, palpitations  PULM: Denies wheezing, hemoptysis  GI: Denies hematemesis, hematochezia, melena  : Denies discharge, hematuria  MSK: Denies arthralgias, myalgias  SKIN: Denies rash, lesions  NEURO: Denies paresthesias, weakness  PSYCH: Denies depression, anxiety     VITALS:  T(F): 98, Max: 98.6 (04-13-23 @ 11:49)  HR: 72  BP: 155/75  RR: 17Vital Signs Last 24 Hrs  T(C): 36.7 (14 Apr 2023 00:00), Max: 37 (13 Apr 2023 11:49)  T(F): 98 (14 Apr 2023 00:00), Max: 98.6 (13 Apr 2023 11:49)  HR: 72 (14 Apr 2023 04:00) (70 - 95)  BP: 155/75 (14 Apr 2023 04:00) (127/67 - 158/76)  BP(mean): 104 (14 Apr 2023 04:00) (91 - 109)  RR: 17 (14 Apr 2023 04:00) (7 - 26)  SpO2: 99% (14 Apr 2023 04:00) (91% - 99%)    Parameters below as of 14 Apr 2023 00:00  Patient On (Oxygen Delivery Method): nasal cannula  O2 Flow (L/min): 3      PHYSICAL EXAM:  Gen: NAD, resting in bed  HEENT: Normocephalic, atraumatic  Neck: supple, no lymphadenopathy  CV: Regular rate & regular rhythm  Lungs: decreased BS at bases, no fremitus  Abdomen: Soft, BS present JANICE serosanguinous fluid   Ext: Warm, well perfused  Neuro: non focal, awake  Skin: no rash, no erythema  Lines: no phlebitis     FH: Non-contributory  Social Hx: Non-contributory    TESTS & MEASUREMENTS:                        7.5    6.17  )-----------( 317      ( 13 Apr 2023 21:40 )             23.4     04-13    140  |  99  |  5<L>  ----------------------------<  135<H>  4.0   |  27  |  0.6<L>    Ca    8.6      13 Apr 2023 21:40  Phos  3.3     04-13  Mg     2.0     04-13    TPro  5.9<L>  /  Alb  3.0<L>  /  TBili  0.7  /  DBili  0.4<H>  /  AST  36  /  ALT  113<H>  /  AlkPhos  646<H>  04-13      LIVER FUNCTIONS - ( 13 Apr 2023 21:40 )  Alb: 3.0 g/dL / Pro: 5.9 g/dL / ALK PHOS: 646 U/L / ALT: 113 U/L / AST: 36 U/L / GGT: x               Culture - Blood (collected 04-11-23 @ 13:02)  Source: .Blood Blood-Peripheral  Preliminary Report (04-12-23 @ 23:01):    No growth to date.    Culture - Blood (collected 04-11-23 @ 11:00)  Source: .Blood Blood-Peripheral  Preliminary Report (04-12-23 @ 23:01):    No growth to date.    Culture - Body Fluid with Gram Stain (collected 04-03-23 @ 12:40)  Source: .Body Fluid None  Gram Stain (04-04-23 @ 05:15):    No polymorphonuclear cells seen    No organisms seen    by cytocentrifuge  Final Report (04-09-23 @ 09:22):    No growth at 5 days            INFECTIOUS DISEASES TESTING  COVID-19 PCR: NotDetec (04-09-23 @ 15:07)  strept    INFLAMMATORY MARKERS      RADIOLOGY & ADDITIONAL TESTS:  I have personally reviewed the last available Chest xray  CXR      CT      CARDIOLOGY TESTING  12 Lead ECG:   Ventricular Rate 81 BPM    Atrial Rate 81 BPM    P-R Interval 154 ms    QRS Duration 76 ms    Q-T Interval 386 ms    QTC Calculation(Bazett) 448 ms    P Axis 48 degrees    R Axis 6 degrees    T Axis 14 degrees    Diagnosis Line Normal sinus rhythm  Normal ECG    Confirmed by iDana Aguilar ([id number]) on 4/13/2023 3:13:31 PM (04-12-23 @ 06:17)  12 Lead ECG:   Ventricular Rate 98 BPM    Atrial Rate 98 BPM    P-R Interval 144 ms    QRS Duration 78 ms    Q-T Interval 348 ms    QTC Calculation(Bazett) 444 ms    P Axis 37 degrees    R Axis 0 degrees    T Axis 1 degrees    Diagnosis Line Normal sinus rhythm  Inferior infarct , age undetermined  Abnormal ECG    Confirmed by Elisha Moses MD ([id number]) on 4/10/2023 8:29:51 AM (04-09-23 @ 17:46)      MEDICATIONS  acetaminophen     Tablet .. 650 Oral every 6 hours  enoxaparin Injectable 40 SubCutaneous every 24 hours  insulin lispro (ADMELOG) corrective regimen sliding scale  SubCutaneous Before meals and at bedtime  lactated ringers. 1000 IV Continuous <Continuous>  losartan 100 Oral daily  pantoprazole  Injectable 40 IV Push every 12 hours  piperacillin/tazobactam IVPB.. 3.375 IV Intermittent every 8 hours  sucralfate suspension 1 Oral every 6 hours      WEIGHT  Weight (kg): 68.9 (04-10-23 @ 12:07)  Creatinine, Serum: 0.6 mg/dL (04-13-23 @ 21:40)      ANTIBIOTICS:  piperacillin/tazobactam IVPB.. 3.375 Gram(s) IV Intermittent every 8 hours      All available historical records have been reviewed

## 2023-04-14 NOTE — PROGRESS NOTE ADULT - SUBJECTIVE AND OBJECTIVE BOX
Gastroenterology progress note:     Patient is a 63y old  Female who presents with a chief complaint of S/P whipple with melena (14 Apr 2023 09:59)       Admitted on: 04-09-23    We are following the patient for: melena and elevated LFTs       Interval History:    No acute events overnight.   reported 1 episode of melena this AM  Hb 6.9      PAST MEDICAL & SURGICAL HISTORY:  HTN (hypertension)      High cholesterol      Diabetes      COPD (chronic obstructive pulmonary disease)      Kidney stones      No significant past surgical history          MEDICATIONS  (STANDING):  acetaminophen     Tablet .. 650 milliGRAM(s) Oral every 6 hours  chlorhexidine 2% Cloths 1 Application(s) Topical <User Schedule>  enoxaparin Injectable 40 milliGRAM(s) SubCutaneous every 24 hours  insulin lispro (ADMELOG) corrective regimen sliding scale   SubCutaneous Before meals and at bedtime  iron sucrose IVPB 100 milliGRAM(s) IV Intermittent once  lactated ringers. 1000 milliLiter(s) (50 mL/Hr) IV Continuous <Continuous>  losartan 100 milliGRAM(s) Oral daily  pantoprazole  Injectable 40 milliGRAM(s) IV Push every 12 hours  piperacillin/tazobactam IVPB.. 3.375 Gram(s) IV Intermittent every 8 hours  sucralfate suspension 1 Gram(s) Oral every 6 hours    MEDICATIONS  (PRN):  HYDROmorphone  Injectable 0.5 milliGRAM(s) IV Push every 6 hours PRN Severe Pain (7 - 10)      Allergies  No Known Allergies      Review of Systems:   Cardiovascular:  No Chest Pain, No Palpitations  Respiratory:  No Cough, No Dyspnea  Gastrointestinal:  As described in HPI  Skin:  No Skin Lesions, No Jaundice  Neuro:  No Syncope, No Dizziness    Physical Examination:  T(C): 36.9 (04-14-23 @ 12:03), Max: 36.9 (04-14-23 @ 12:03)  HR: 77 (04-14-23 @ 12:03) (70 - 95)  BP: 127/74 (04-14-23 @ 12:03) (125/74 - 158/76)  RR: 15 (04-14-23 @ 12:03) (7 - 26)  SpO2: 94% (04-14-23 @ 12:03) (91% - 99%)      04-13-23 @ 07:01  -  04-14-23 @ 07:00  --------------------------------------------------------  IN: 1920 mL / OUT: 1870 mL / NET: 50 mL    04-14-23 @ 07:01  -  04-14-23 @ 14:21  --------------------------------------------------------  IN: 780 mL / OUT: 305 mL / NET: 475 mL        GENERAL: AAOx3, no acute distress.  HEAD:  Atraumatic, Normocephalic  EYES: conjunctiva and sclera clear  NECK: Supple, no JVD or thyromegaly  CHEST/LUNG: Clear to auscultation bilaterally; No wheeze, rhonchi, or rales  HEART: Regular rate and rhythm; normal S1, S2, No murmurs.  ABDOMEN: Soft, nondistended; Bowel sounds present, clean wound site, drain in place  NEUROLOGY: No asterixis or tremor.   SKIN: Intact, no jaundice     Data:                        6.9    5.52  )-----------( 410      ( 14 Apr 2023 11:45 )             21.5     Hgb trend:  6.9  04-14-23 @ 11:45  7.5  04-13-23 @ 21:40  7.8  04-13-23 @ 11:37  7.4  04-12-23 @ 21:15  7.5  04-12-23 @ 11:22  8.1  04-12-23 @ 05:54  6.7  04-12-23 @ 00:25  6.3  04-11-23 @ 21:22      04-11-23 @ 07:01  -  04-12-23 @ 07:00  --------------------------------------------------------  IN: 300 mL      04-13    140  |  99  |  5<L>  ----------------------------<  135<H>  4.0   |  27  |  0.6<L>    Ca    8.6      13 Apr 2023 21:40  Phos  3.3     04-13  Mg     2.0     04-13    TPro  5.9<L>  /  Alb  3.0<L>  /  TBili  0.7  /  DBili  0.4<H>  /  AST  36  /  ALT  113<H>  /  AlkPhos  646<H>  04-13    Liver panel trend:  TBili 0.7   /   AST 36   /      /   AlkP 646   /   Tptn 5.9   /   Alb 3.0    /   DBili 0.4      04-13  TBili 0.6   /   AST 36   /      /   AlkP 653   /   Tptn 5.4   /   Alb 2.9    /   DBili 0.3      04-12  TBili 0.9   /   AST 62   /      /   AlkP 689   /   Tptn 5.1   /   Alb 2.9    /   DBili 0.4      04-11  TBili 1.2   /      /      /   AlkP 952   /   Tptn 5.5   /   Alb 3.1    /   DBili 0.6      04-10  TBili 2.4   /      /      /   AlkP 1108   /   Tptn 5.4   /   Alb 3.0    /   DBili 2.1      04-10  TBili 3.4   /      /      /   AlkP 1050   /   Tptn 5.1   /   Alb 3.0    /   DBili 3.1      04-09  TBili 3.3   /      /      /   AlkP 1146   /   Tptn 5.8   /   Alb 3.3    /   DBili 3.0      04-09  TBili 0.8   /   AST 93   /   ALT 63   /   AlkP 444   /   Tptn 5.5   /   Alb 3.2    /   DBili 0.2      04-07  TBili 0.7   /   AST 25   /   ALT 23   /   AlkP 201   /   Tptn 5.1   /   Alb 3.0    /   DBili 0.2      04-06  TBili 1.0   /   AST 28   /   ALT 27   /   AlkP 90   /   Tptn 5.0   /   Alb 3.1    /   DBili 0.2      04-06  TBili 1.2   /   AST 38   /   ALT 35   /   AlkP 49   /   Tptn 5.0   /   Alb 3.2    /   DBili 0.4      04-05

## 2023-04-14 NOTE — PROGRESS NOTE ADULT - ASSESSMENT
Assessment & Plan  63y Female w/ hx of IPMN s/p pylorus-preserving Whipple (4/3/23), melanotic stool, tachy, febrile, upgrade for possible cholangitis    NEURO:  #Acute pain    -APAP 650mg q6 prn    -Inpatient Rx: HYDROmorphone  Injectable 0.5 milliGRAM(s) IV Push every 6 hours PRN    RESP:   #Oxygen insufficiency     -wean off NC to RA as tolerated    -encourage IS     CARDS:   4/13   NIGHT  no episodes of acute melena   resting comfortably         4/13  DAY  -tightened ISS, fs achs   -70 cc/24 hrs JANICE drain output  -Ambulating well around the unit, vitals remain stable   - Changed tylenol from prn to standing for continued post-prandial pain  - Conitue ICU for HD monitoring and observing for melena        #HTN    -Rx-losartan 100 daily (held)  #Imaging/Labs    -EKG (4/9): NSR; inferior infarct age indeterminate    GI/NUTR:   #POD #8 s/p Whipple, RLQ JANICE, prevena in place   #Elevated LFTs (downtrending)    #Melanotic stool    -GI following, s/p EGD, if any more melanotic stool will re-eval the HJ anastomosis     -EDG 4/10: Multiple small (<1 cm) clean based ulcers in the antrum. A marginal, circumferential anastomotic ulcer measuring around 3 cm was seen at the duodeno-jejunotomy with surrounding edema, stigmata of recent bleeding, and visible surgical sutures. There was no evidence of active bleeding. The efferent limb was examined and had no obstruction but had multiple small (< 1  cm) clean based ulcers. The EGD scope was then exchanged to a PCF colonoscope and the afferent limb was examined with bile seen and no evidence of bleeding. The plastic CBD stent was noted to be in position.    -Monitor Hgb    -PPI bid, carafate   #Diet, CLD    -aspiration precautions, HOB 30  #GI Prophylaxis    -PPI BID  #Bowel regimen- held    -last bowel movement 4/12    /RENAL:   #urine output in critically ill    -voiding    LR @50cc, IV lock when pt takes sufficient PO intake    Labs:          BUN/Cr- 6/0.5  -->,  5/0.5  -->          Electrolytes-Na 138 // K 4.1 // Mg 1.7 //  Phos 3.7 (04-12 @ 21:15)  #maintain euvolemia    HEME/ONC:   #DVT prophylaxis    -lovenox 40mg daily, SCDs    Labs: Hb/Hct:  7.5/22.6  -->,  7.4/22.2  -->                      Plts:  289  -->,  321  -->                 PTT/INR:     T&S Expires: 4/14  Blood Consent in chart   #DVT prophylaxis    -enoxaparin Injectable    ID:  #leukocytosis     -WBC- 9.49  --->>,  9.01  --->>,  9.62  --->>    -Temp trend- 24hrs T(F): 99.2 (04-12 @ 23:20), Max: 100.2 (04-12 @ 20:00)    -Antibiotics-piperacillin/tazobactam IVPB.. 3.375 every 8 hours    -ID following     -Cultures:    (collected 04-11 @ 13:02)  Source: .Blood Blood-Peripheral  Preliminary Report:  No growth to date.     (collected 04-11 @ 11:00)  Source: .Blood Blood-Peripheral  Preliminary Report: No growth to date.    ENDO:    -FSG q6 if NPO or Tube feeds    -Glucose goal 140-180    -if above 180 start ISS     -HA1C (3/13): 7.1    MSK:     Activity - Ambulate as Tolerated:     Time/Priority:  Routine (04-09-23 @ 18:46)    LINES/DRAINS:  PIV    ADVANCED DIRECTIVES:  Full Code    HCP/Emergency Contact-    INDICATION FOR SICU: Cholangitis    DISPO: SICU vs SDU vs downgrade    Assessment & Plan  63y Female w/ hx of IPMN s/p pylorus-preserving Whipple (4/3/23), melanotic stool, tachy, febrile, upgrade for possible cholangitis    NEURO:  #Acute pain    -APAP 650mg q6 prn    -Inpatient Rx: HYDROmorphone  Injectable 0.5 milliGRAM(s) IV Push every 6 hours PRN    RESP:   #Oxygen insufficiency     -wean off NC to RA as tolerated    -encourage IS     CARDS:   #HTN    -Rx-losartan 100 daily (held)  #Imaging/Labs    -EKG (4/9): NSR; inferior infarct age indeterminate    GI/NUTR:   #POD #8 s/p Whipple, RLQ JANICE, prevena in place   #Elevated LFTs (downtrending)    #Melanotic stool    -GI following, s/p EGD, if any more melanotic stool will re-eval the HJ anastomosis     -EDG 4/10: Multiple small (<1 cm) clean based ulcers in the antrum. A marginal, circumferential anastomotic ulcer measuring around 3 cm was seen at the duodeno-jejunotomy with surrounding edema, stigmata of recent bleeding, and visible surgical sutures. There was no evidence of active bleeding. The efferent limb was examined and had no obstruction but had multiple small (< 1  cm) clean based ulcers. The EGD scope was then exchanged to a PCF colonoscope and the afferent limb was examined with bile seen and no evidence of bleeding. The plastic CBD stent was noted to be in position.    -Monitor Hgb    -PPI bid, carafate   #Diet, CLD    -aspiration precautions, HOB 30  #GI Prophylaxis    -PPI BID  #Bowel regimen- held    -last bowel movement 4/12    /RENAL:   #urine output in critically ill    -voiding    LR @50cc, IV lock when pt takes sufficient PO intake    Labs:          BUN/Cr- 6/0.5  -->,  5/0.5  -->          Electrolytes-Na 138 // K 4.1 // Mg 1.7 //  Phos 3.7 (04-12 @ 21:15)  #maintain euvolemia    HEME/ONC:   #DVT prophylaxis    -lovenox 40mg daily, SCDs    Labs: Hb/Hct:  7.5/22.6  -->,  7.4/22.2  -->                      Plts:  289  -->,  321  -->                 PTT/INR:     T&S Expires: 4/14  Blood Consent in chart   #DVT prophylaxis    -enoxaparin Injectable    ID:  #leukocytosis     -WBC- 9.49  --->>,  9.01  --->>,  9.62  --->>    -Temp trend- 24hrs T(F): 99.2 (04-12 @ 23:20), Max: 100.2 (04-12 @ 20:00)    -Antibiotics-piperacillin/tazobactam IVPB.. 3.375 every 8 hours    -ID following     -Cultures:    (collected 04-11 @ 13:02)  Source: .Blood Blood-Peripheral  Preliminary Report:  No growth to date.     (collected 04-11 @ 11:00)  Source: .Blood Blood-Peripheral  Preliminary Report: No growth to date.    ENDO:    -FSG q6 if NPO or Tube feeds    -Glucose goal 140-180    -if above 180 start ISS     -HA1C (3/13): 7.1    MSK:     Activity - Ambulate as Tolerated:     Time/Priority:  Routine (04-09-23 @ 18:46)    LINES/DRAINS:  PIV    ADVANCED DIRECTIVES:  Full Code    HCP/Emergency Contact-    INDICATION FOR SICU: Cholangitis    DISPO: SICU vs SDU vs downgrade    Assessment & Plan  63y Female w/ hx of IPMN s/p pylorus-preserving Whipple (4/3/23), melanotic stool, tachy, febrile, upgrade for possible cholangitis    NEURO:  #Acute pain    -APAP 650mg q6hrs     -Inpatient Rx: HYDROmorphone  Injectable 0.5 milliGRAM(s) IV Push every 6 hours PRN    RESP:   #Oxygen insufficiency     -wean off NC to RA as tolerated    -encourage IS     CARDS:   #HTN    -Rx-losartan 100 daily (held)  #Imaging/Labs    -EKG (4/9): NSR; inferior infarct age indeterminate  - Mild chest pressure overnight, EKG this AM without concern for ACS, nml interval changes (except IA 150ms, no signs of higher level heart block)    GI/NUTR:   #POD #8 s/p Whipple, RLQ JANICE, prevena in place   #Elevated LFTs (downtrending)    #Melanotic stool    -GI following, s/p EGD, if any more melanotic stool will re-eval the HJ anastomosis     -EDG 4/10: Multiple small (<1 cm) clean based ulcers in the antrum. A marginal, circumferential anastomotic ulcer measuring around 3 cm was seen at the duodeno-jejunotomy with surrounding edema, stigmata of recent bleeding, and visible surgical sutures. There was no evidence of active bleeding. The efferent limb was examined and had no obstruction but had multiple small (< 1  cm) clean based ulcers. The EGD scope was then exchanged to a PCF colonoscope and the afferent limb was examined with bile seen and no evidence of bleeding. The plastic CBD stent was noted to be in position.    -Monitor Hgb    -PPI bid, carafate   #Diet, CLD    -aspiration precautions, HOB 30  #GI Prophylaxis    -PPI BID  #Bowel regimen- held    -Very small volume melena on 4/12 and then 4/14.     /RENAL:   #urine output in critically ill    -voiding    LR @50cc, IV lock when pt takes sufficient PO intake    Labs:          BUN/Cr- 6/0.5  -->,  5/0.5  -->          Electrolytes-Na 138 // K 4.1 // Mg 1.7 //  Phos 3.7 (04-12 @ 21:15)  #maintain euvolemia    HEME/ONC:   #DVT prophylaxis    -lovenox 40mg daily, SCDs    Labs: Hb/Hct:  7.5/22.6  -->,  7.4/22.2  -->                      Plts:  289  -->,  321  -->                 PTT/INR:     T&S Expires: 4/14  Blood Consent in chart   #DVT prophylaxis    -enoxaparin Injectable    ID:  #leukocytosis     -WBC- 9.49  --->>,  9.01  --->>,  9.62  --->>    -Temp trend- 24hrs T(F): 99.2 (04-12 @ 23:20), Max: 100.2 (04-12 @ 20:00)    -Antibiotics-piperacillin/tazobactam IVPB.. 3.375 every 8 hours    -ID following     -Cultures:    (collected 04-11 @ 13:02)  Source: .Blood Blood-Peripheral  Preliminary Report:  No growth to date.     (collected 04-11 @ 11:00)  Source: .Blood Blood-Peripheral  Preliminary Report: No growth to date.    ENDO:    -FSG q6 if NPO or Tube feeds    -Glucose goal 140-180    -if above 180 start ISS     -HA1C (3/13): 7.1    MSK:     Activity - Ambulate as Tolerated:     Time/Priority:  Routine (04-09-23 @ 18:46)  Patient walking multiple laps around unit w/o difficulty    LINES/DRAINS:  PIV    ADVANCED DIRECTIVES:  Full Code    HCP/Emergency Contact-    INDICATION FOR SICU: Cholangitis    DISPO: SICU vs SDU vs downgrade

## 2023-04-14 NOTE — PROGRESS NOTE ADULT - SUBJECTIVE AND OBJECTIVE BOX
HPI   63y F  POD #9 s/p Pancreaticoduodenectomy on 4/3/2023 and d/c on 4/8, presented for lightheadedness and recent episodes of melanotic stools (last 4/9 in afternoon). Denies fevers/chills, chest pain, shortness of breath, syncope. She states that before admission she had an episode of nausea and retching without vomiting and increasing abdominal pain.  In ED, Hgb remains at 7.2, LFTs elevated Tbili 3.3 now 1.2, Alk phos 1146, now 952. CT was unremarkable, GI c/s, EGD done 4/10 to evaluate the GJ anastomosis no active bleeding noted, old blood noted, negative for obstruction. Pt was tachycardic on the floor 110s, tmax 100.8, bolused 500cc LR, started IVF, zosyn given c/f cholangitis.   PMH  PAST MEDICAL & SURGICAL HISTORY:  HTN (hypertension)  High cholesterol  Diabetes  COPD (chronic obstructive pulmonary disease)  Kidney stones  No significant past surgical history    Home Meds:  Home Medications:  acetaminophen 325 mg oral tablet: 2 tab(s) orally every 6 hours as needed for  pain (08 Apr 2023 10:59)  Benicar: orally once a day (03 Apr 2023 06:33)  Lipitor: orally once a day (03 Apr 2023 06:33)  metFORMIN:  (03 Apr 2023 06:33)    Allergies  No Known Allergies     24 Hour Events  4/13   NIGHT  -t          [X] A ten-point review of systems was otherwise negative except as noted above.  [  ] Due to altered mental status/intubation, subjective information was not attained from the patient. History was obtained, to the extent possible, from review of the chart and collateral sources of information.    =========================================================================================================================================     HPI   63y F  POD #9 s/p Pancreaticoduodenectomy on 4/3/2023 and d/c on 4/8, presented for lightheadedness and recent episodes of melanotic stools (last 4/9 in afternoon). Denies fevers/chills, chest pain, shortness of breath, syncope. She states that before admission she had an episode of nausea and retching without vomiting and increasing abdominal pain.  In ED, Hgb remains at 7.2, LFTs elevated Tbili 3.3 now 1.2, Alk phos 1146, now 952. CT was unremarkable, GI c/s, EGD done 4/10 to evaluate the GJ anastomosis no active bleeding noted, old blood noted, negative for obstruction. Pt was tachycardic on the floor 110s, tmax 100.8, bolused 500cc LR, started IVF, zosyn given c/f cholangitis.   PMH  PAST MEDICAL & SURGICAL HISTORY:  HTN (hypertension)  High cholesterol  Diabetes  COPD (chronic obstructive pulmonary disease)  Kidney stones  No significant past surgical history    Home Meds:  Home Medications:  acetaminophen 325 mg oral tablet: 2 tab(s) orally every 6 hours as needed for  pain (08 Apr 2023 10:59)  Benicar: orally once a day (03 Apr 2023 06:33)  Lipitor: orally once a day (03 Apr 2023 06:33)  metFORMIN:  (03 Apr 2023 06:33)    Allergies  No Known Allergies     24 Hour Events  4/13   NIGHT  no episodes of acute melena   resting comfortably         4/13  DAY  -tightened ISS, fs achs   -70 cc/24 hrs JANICE drain output  -Ambulating well around the unit, vitals remain stable   - Changed tylenol from prn to standing for continued post-prandial pain  - Conitue ICU for HD monitoring and observing for melena        [X] A ten-point review of systems was otherwise negative except as noted above.  [  ] Due to altered mental status/intubation, subjective information was not attained from the patient. History was obtained, to the extent possible, from review of the chart and collateral sources of information.    =========================================================================================================================================     HPI   63y F  POD #9 s/p Pancreaticoduodenectomy on 4/3/2023 and d/c on 4/8, presented for lightheadedness and recent episodes of melanotic stools (last 4/9 in afternoon). Denies fevers/chills, chest pain, shortness of breath, syncope. She states that before admission she had an episode of nausea and retching without vomiting and increasing abdominal pain.  In ED, Hgb remains at 7.2, LFTs elevated Tbili 3.3 now 1.2, Alk phos 1146, now 952. CT was unremarkable, GI c/s, EGD done 4/10 to evaluate the GJ anastomosis no active bleeding noted, old blood noted, negative for obstruction. Pt was tachycardic on the floor 110s, tmax 100.8, bolused 500cc LR, started IVF, zosyn given c/f cholangitis.   PMH  PAST MEDICAL & SURGICAL HISTORY:  HTN (hypertension)  High cholesterol  Diabetes  COPD (chronic obstructive pulmonary disease)  Kidney stones  No significant past surgical history    Home Meds:  Home Medications:  acetaminophen 325 mg oral tablet: 2 tab(s) orally every 6 hours as needed for  pain (08 Apr 2023 10:59)  Benicar: orally once a day (03 Apr 2023 06:33)  Lipitor: orally once a day (03 Apr 2023 06:33)  metFORMIN:  (03 Apr 2023 06:33)    Allergies  No Known Allergies     24 Hour Events  4/13   NIGHT  no episodes of acute melena   resting comfortably         4/13  DAY  -tightened ISS, fs achs   -70 cc/24 hrs JANICE drain output  -Ambulating well around the unit, vitals remain stable   - Changed tylenol from prn to standing for continued post-prandial pain  - Conitue ICU for HD monitoring and observing for melena        [X] A ten-point review of systems was otherwise negative except as noted above.  [  ] Due to altered mental status/intubation, subjective information was not attained from the patient. History was obtained, to the extent possible, from review of the chart and collateral sources of information.    =========================================================================================================================================      Daily     Daily   Diet, Clear Liquid:   Consistent Carbohydrate No Snacks  DASH/TLC Sodium & Cholesterol Restricted  Prosource Gelatein 20 Sugar Free     Qty per Day:  3  Supplement Feeding Modality:  Oral  Ensure Clear Cans or Servings Per Day:  1       Frequency:  Daily (04-12-23 @ 14:36)    CURRENT MEDS:  Neurologic Medications  acetaminophen     Tablet .. 650 milliGRAM(s) Oral every 6 hours PRN Mild Pain (1 - 3)  acetaminophen     Tablet .. 650 milliGRAM(s) Oral every 6 hours  HYDROmorphone  Injectable 0.5 milliGRAM(s) IV Push every 6 hours PRN Severe Pain (7 - 10)    Respiratory Medications    Cardiovascular Medications  losartan 100 milliGRAM(s) Oral daily    Gastrointestinal Medications  lactated ringers. 1000 milliLiter(s) IV Continuous <Continuous>  pantoprazole  Injectable 40 milliGRAM(s) IV Push every 12 hours  sucralfate suspension 1 Gram(s) Oral every 6 hours    Genitourinary Medications    Hematologic/Oncologic Medications  enoxaparin Injectable 40 milliGRAM(s) SubCutaneous every 24 hours    Antimicrobial/Immunologic Medications  piperacillin/tazobactam IVPB.. 3.375 Gram(s) IV Intermittent every 8 hours    Endocrine/Metabolic Medications  insulin lispro (ADMELOG) corrective regimen sliding scale   SubCutaneous Before meals and at bedtime    Topical/Other Medications    ICU Vital Signs Last 24 Hrs  T(C): 36.6 (14 Apr 2023 04:00), Max: 37 (13 Apr 2023 11:49)  T(F): 97.8 (14 Apr 2023 04:00), Max: 98.6 (13 Apr 2023 11:49)  HR: 92 (14 Apr 2023 07:00) (70 - 95)  BP: 139/76 (14 Apr 2023 06:00) (127/67 - 158/76)  BP(mean): 99 (14 Apr 2023 06:00) (91 - 109)  ABP: --  ABP(mean): --  RR: 23 (14 Apr 2023 07:00) (7 - 26)  SpO2: 93% (14 Apr 2023 07:00) (91% - 99%)    O2 Parameters below as of 14 Apr 2023 04:00  Patient On (Oxygen Delivery Method): nasal cannula  O2 Flow (L/min): 3            I&O's Summary    13 Apr 2023 07:01  -  14 Apr 2023 07:00  --------------------------------------------------------  IN: 1920 mL / OUT: 1870 mL / NET: 50 mL      I&O's Detail    13 Apr 2023 07:01  -  14 Apr 2023 07:00  --------------------------------------------------------  IN:    Lactated Ringers: 1200 mL    Oral Fluid: 720 mL  Total IN: 1920 mL    OUT:    Bulb (mL): 20 mL    Voided (mL): 1850 mL  Total OUT: 1870 mL    Total NET: 50 mL          PHYSICAL EXAM:     a&ox3  follows commands, MATTHEW  no acute distress  equal chest rise b/l  abdomen soft  extremities soft

## 2023-04-15 LAB
ALBUMIN SERPL ELPH-MCNC: 3.2 G/DL — LOW (ref 3.5–5.2)
ALP SERPL-CCNC: 642 U/L — HIGH (ref 30–115)
ALT FLD-CCNC: 74 U/L — HIGH (ref 0–41)
ANION GAP SERPL CALC-SCNC: 11 MMOL/L — SIGNIFICANT CHANGE UP (ref 7–14)
ANION GAP SERPL CALC-SCNC: 12 MMOL/L — SIGNIFICANT CHANGE UP (ref 7–14)
AST SERPL-CCNC: 35 U/L — SIGNIFICANT CHANGE UP (ref 0–41)
BASOPHILS # BLD AUTO: 0.02 K/UL — SIGNIFICANT CHANGE UP (ref 0–0.2)
BASOPHILS NFR BLD AUTO: 0.4 % — SIGNIFICANT CHANGE UP (ref 0–1)
BILIRUB DIRECT SERPL-MCNC: 0.2 MG/DL — SIGNIFICANT CHANGE UP (ref 0–0.3)
BILIRUB INDIRECT FLD-MCNC: 0.6 MG/DL — SIGNIFICANT CHANGE UP (ref 0.2–1.2)
BILIRUB SERPL-MCNC: 0.8 MG/DL — SIGNIFICANT CHANGE UP (ref 0.2–1.2)
BUN SERPL-MCNC: 5 MG/DL — LOW (ref 10–20)
BUN SERPL-MCNC: 5 MG/DL — LOW (ref 10–20)
CALCIUM SERPL-MCNC: 8.5 MG/DL — SIGNIFICANT CHANGE UP (ref 8.4–10.5)
CALCIUM SERPL-MCNC: 8.6 MG/DL — SIGNIFICANT CHANGE UP (ref 8.4–10.5)
CHLORIDE SERPL-SCNC: 102 MMOL/L — SIGNIFICANT CHANGE UP (ref 98–110)
CHLORIDE SERPL-SCNC: 103 MMOL/L — SIGNIFICANT CHANGE UP (ref 98–110)
CO2 SERPL-SCNC: 28 MMOL/L — SIGNIFICANT CHANGE UP (ref 17–32)
CO2 SERPL-SCNC: 28 MMOL/L — SIGNIFICANT CHANGE UP (ref 17–32)
CREAT SERPL-MCNC: 0.5 MG/DL — LOW (ref 0.7–1.5)
CREAT SERPL-MCNC: 0.5 MG/DL — LOW (ref 0.7–1.5)
EGFR: 106 ML/MIN/1.73M2 — SIGNIFICANT CHANGE UP
EGFR: 106 ML/MIN/1.73M2 — SIGNIFICANT CHANGE UP
EOSINOPHIL # BLD AUTO: 0.09 K/UL — SIGNIFICANT CHANGE UP (ref 0–0.7)
EOSINOPHIL NFR BLD AUTO: 2 % — SIGNIFICANT CHANGE UP (ref 0–8)
GLUCOSE BLDC GLUCOMTR-MCNC: 130 MG/DL — HIGH (ref 70–99)
GLUCOSE BLDC GLUCOMTR-MCNC: 136 MG/DL — HIGH (ref 70–99)
GLUCOSE BLDC GLUCOMTR-MCNC: 137 MG/DL — HIGH (ref 70–99)
GLUCOSE BLDC GLUCOMTR-MCNC: 147 MG/DL — HIGH (ref 70–99)
GLUCOSE SERPL-MCNC: 133 MG/DL — HIGH (ref 70–99)
GLUCOSE SERPL-MCNC: 134 MG/DL — HIGH (ref 70–99)
HCT VFR BLD CALC: 27.6 % — LOW (ref 37–47)
HCT VFR BLD CALC: 28 % — LOW (ref 37–47)
HGB BLD-MCNC: 9 G/DL — LOW (ref 12–16)
HGB BLD-MCNC: 9.3 G/DL — LOW (ref 12–16)
IMM GRANULOCYTES NFR BLD AUTO: 1.1 % — HIGH (ref 0.1–0.3)
LIDOCAIN IGE QN: 109 U/L — HIGH (ref 7–60)
LYMPHOCYTES # BLD AUTO: 1.14 K/UL — LOW (ref 1.2–3.4)
LYMPHOCYTES # BLD AUTO: 25.3 % — SIGNIFICANT CHANGE UP (ref 20.5–51.1)
MAGNESIUM SERPL-MCNC: 1.9 MG/DL — SIGNIFICANT CHANGE UP (ref 1.8–2.4)
MAGNESIUM SERPL-MCNC: 1.9 MG/DL — SIGNIFICANT CHANGE UP (ref 1.8–2.4)
MCHC RBC-ENTMCNC: 28.9 PG — SIGNIFICANT CHANGE UP (ref 27–31)
MCHC RBC-ENTMCNC: 30 PG — SIGNIFICANT CHANGE UP (ref 27–31)
MCHC RBC-ENTMCNC: 32.1 G/DL — SIGNIFICANT CHANGE UP (ref 32–37)
MCHC RBC-ENTMCNC: 33.7 G/DL — SIGNIFICANT CHANGE UP (ref 32–37)
MCV RBC AUTO: 89 FL — SIGNIFICANT CHANGE UP (ref 81–99)
MCV RBC AUTO: 90 FL — SIGNIFICANT CHANGE UP (ref 81–99)
MONOCYTES # BLD AUTO: 0.41 K/UL — SIGNIFICANT CHANGE UP (ref 0.1–0.6)
MONOCYTES NFR BLD AUTO: 9.1 % — SIGNIFICANT CHANGE UP (ref 1.7–9.3)
NEUTROPHILS # BLD AUTO: 2.79 K/UL — SIGNIFICANT CHANGE UP (ref 1.4–6.5)
NEUTROPHILS NFR BLD AUTO: 62.1 % — SIGNIFICANT CHANGE UP (ref 42.2–75.2)
NRBC # BLD: 0 /100 WBCS — SIGNIFICANT CHANGE UP (ref 0–0)
NRBC # BLD: 0 /100 WBCS — SIGNIFICANT CHANGE UP (ref 0–0)
PHOSPHATE SERPL-MCNC: 3.3 MG/DL — SIGNIFICANT CHANGE UP (ref 2.1–4.9)
PHOSPHATE SERPL-MCNC: 3.5 MG/DL — SIGNIFICANT CHANGE UP (ref 2.1–4.9)
PLATELET # BLD AUTO: 446 K/UL — HIGH (ref 130–400)
PLATELET # BLD AUTO: 463 K/UL — HIGH (ref 130–400)
PMV BLD: 9.3 FL — SIGNIFICANT CHANGE UP (ref 7.4–10.4)
PMV BLD: 9.6 FL — SIGNIFICANT CHANGE UP (ref 7.4–10.4)
POTASSIUM SERPL-MCNC: 3.9 MMOL/L — SIGNIFICANT CHANGE UP (ref 3.5–5)
POTASSIUM SERPL-MCNC: 4.5 MMOL/L — SIGNIFICANT CHANGE UP (ref 3.5–5)
POTASSIUM SERPL-SCNC: 3.9 MMOL/L — SIGNIFICANT CHANGE UP (ref 3.5–5)
POTASSIUM SERPL-SCNC: 4.5 MMOL/L — SIGNIFICANT CHANGE UP (ref 3.5–5)
PROT SERPL-MCNC: 5.8 G/DL — LOW (ref 6–8)
RBC # BLD: 3.1 M/UL — LOW (ref 4.2–5.4)
RBC # BLD: 3.11 M/UL — LOW (ref 4.2–5.4)
RBC # FLD: 15.1 % — HIGH (ref 11.5–14.5)
RBC # FLD: 15.4 % — HIGH (ref 11.5–14.5)
SODIUM SERPL-SCNC: 142 MMOL/L — SIGNIFICANT CHANGE UP (ref 135–146)
SODIUM SERPL-SCNC: 142 MMOL/L — SIGNIFICANT CHANGE UP (ref 135–146)
WBC # BLD: 3.83 K/UL — LOW (ref 4.8–10.8)
WBC # BLD: 4.5 K/UL — LOW (ref 4.8–10.8)
WBC # FLD AUTO: 3.83 K/UL — LOW (ref 4.8–10.8)
WBC # FLD AUTO: 4.5 K/UL — LOW (ref 4.8–10.8)

## 2023-04-15 PROCEDURE — 99233 SBSQ HOSP IP/OBS HIGH 50: CPT | Mod: 24

## 2023-04-15 RX ORDER — POTASSIUM CHLORIDE 20 MEQ
20 PACKET (EA) ORAL
Refills: 0 | Status: DISCONTINUED | OUTPATIENT
Start: 2023-04-15 | End: 2023-04-17

## 2023-04-15 RX ORDER — MAGNESIUM SULFATE 500 MG/ML
2 VIAL (ML) INJECTION ONCE
Refills: 0 | Status: DISCONTINUED | OUTPATIENT
Start: 2023-04-15 | End: 2023-04-17

## 2023-04-15 RX ORDER — POTASSIUM CHLORIDE 20 MEQ
20 PACKET (EA) ORAL ONCE
Refills: 0 | Status: COMPLETED | OUTPATIENT
Start: 2023-04-15 | End: 2023-04-15

## 2023-04-15 RX ORDER — MAGNESIUM SULFATE 500 MG/ML
2 VIAL (ML) INJECTION ONCE
Refills: 0 | Status: DISCONTINUED | OUTPATIENT
Start: 2023-04-15 | End: 2023-04-15

## 2023-04-15 RX ADMIN — Medication 650 MILLIGRAM(S): at 18:29

## 2023-04-15 RX ADMIN — PIPERACILLIN AND TAZOBACTAM 25 GRAM(S): 4; .5 INJECTION, POWDER, LYOPHILIZED, FOR SOLUTION INTRAVENOUS at 21:35

## 2023-04-15 RX ADMIN — Medication 1 GRAM(S): at 11:26

## 2023-04-15 RX ADMIN — PIPERACILLIN AND TAZOBACTAM 25 GRAM(S): 4; .5 INJECTION, POWDER, LYOPHILIZED, FOR SOLUTION INTRAVENOUS at 16:29

## 2023-04-15 RX ADMIN — Medication 50 MILLIEQUIVALENT(S): at 16:30

## 2023-04-15 RX ADMIN — Medication 1 GRAM(S): at 05:45

## 2023-04-15 RX ADMIN — PANTOPRAZOLE SODIUM 40 MILLIGRAM(S): 20 TABLET, DELAYED RELEASE ORAL at 18:29

## 2023-04-15 RX ADMIN — Medication 650 MILLIGRAM(S): at 06:57

## 2023-04-15 RX ADMIN — PANTOPRAZOLE SODIUM 40 MILLIGRAM(S): 20 TABLET, DELAYED RELEASE ORAL at 05:44

## 2023-04-15 RX ADMIN — Medication 650 MILLIGRAM(S): at 11:26

## 2023-04-15 RX ADMIN — Medication 50 MILLIEQUIVALENT(S): at 10:31

## 2023-04-15 RX ADMIN — LOSARTAN POTASSIUM 100 MILLIGRAM(S): 100 TABLET, FILM COATED ORAL at 05:45

## 2023-04-15 RX ADMIN — Medication 1 GRAM(S): at 21:30

## 2023-04-15 RX ADMIN — ENOXAPARIN SODIUM 40 MILLIGRAM(S): 100 INJECTION SUBCUTANEOUS at 05:44

## 2023-04-15 RX ADMIN — Medication 50 MILLIEQUIVALENT(S): at 12:51

## 2023-04-15 RX ADMIN — Medication 650 MILLIGRAM(S): at 05:45

## 2023-04-15 RX ADMIN — PIPERACILLIN AND TAZOBACTAM 25 GRAM(S): 4; .5 INJECTION, POWDER, LYOPHILIZED, FOR SOLUTION INTRAVENOUS at 05:48

## 2023-04-15 RX ADMIN — Medication 650 MILLIGRAM(S): at 19:00

## 2023-04-15 NOTE — CHART NOTE - NSCHARTNOTEFT_GEN_A_CORE
SICU Transfer Note    JEREMIAS IVEY  63y (1960)  [id number]      Transfer from: SICU   Transfer to: Surgery     SICU COURSE:  63y F  POD #12 s/p Pancreaticoduodenectomy on 4/3/2023 and d/c on 4/8, presented for lightheadedness and recent episodes of melanotic stools (last 4/9 in afternoon). Denies fevers/chills, chest pain, shortness of breath, syncope. She states that before admission she had an episode of nausea and retching without vomiting and increasing abdominal pain.  In ED, Hgb remains at 7.2, LFTs elevated Tbili 3.3 now 1.2, Alk phos 1146, now 952. CT was unremarkable, GI c/s, EGD done 4/10 to evaluate the GJ anastomosis no active bleeding noted, old blood noted, negative for obstruction. Pt was tachycardic on the floor 110s, tmax 100.8, bolused 500cc LR, started IVF, zosyn given c/f cholangitis. Pt was upgraded for monitoring in setting of possible cholangitis.    SICU COURSE:  While in SICU, patient received 2U PRBC. First unit was on 4/11 for Hgb 6.7, second unit 4/14 for Hgb 6.9; hemoglobin responded to both transfusions. had 2 episodes of melena; last episode 4/15 at 10AM.  Plan per Dr. Blanco is to obtain repeat EGD with GI next week, earlier if additional episodes of melena. Patient's LFTs, lipase, and amylase were trended; now downtrending. Patient has been tolerating clear diet. JANICE drain output has been serosanginous. Patient has been on Zosyn for intra-abdominal infection ppx; per ID can dc on 4/16 and transition to PO levaquin and flagyl. Patient has been mentating well, adequately voiding, non-tachycardic, with stable BP, and has been ambulating daily. Per Dr. Umanzor on AM rounds, patient approved for downgrade.           PAST MEDICAL & SURGICAL HISTORY:  HTN (hypertension)      High cholesterol      Diabetes      COPD (chronic obstructive pulmonary disease)      Kidney stones      No significant past surgical history        Allergies    No Known Allergies    Intolerances      MEDICATIONS  (STANDING):  acetaminophen     Tablet .. 650 milliGRAM(s) Oral every 6 hours  chlorhexidine 2% Cloths 1 Application(s) Topical <User Schedule>  enoxaparin Injectable 40 milliGRAM(s) SubCutaneous every 24 hours  insulin lispro (ADMELOG) corrective regimen sliding scale   SubCutaneous Before meals and at bedtime  losartan 100 milliGRAM(s) Oral daily  magnesium sulfate  IVPB 2 Gram(s) IV Intermittent once  pantoprazole  Injectable 40 milliGRAM(s) IV Push every 12 hours  piperacillin/tazobactam IVPB.. 3.375 Gram(s) IV Intermittent every 8 hours  potassium chloride  20 mEq/100 mL IVPB 20 milliEquivalent(s) IV Intermittent every 2 hours  sucralfate suspension 1 Gram(s) Oral every 6 hours    MEDICATIONS  (PRN):  HYDROmorphone  Injectable 0.5 milliGRAM(s) IV Push every 6 hours PRN Severe Pain (7 - 10)      Vital Signs Last 24 Hrs  T(C): 36.3 (15 Apr 2023 12:00), Max: 37 (14 Apr 2023 15:30)  T(F): 97.4 (15 Apr 2023 12:00), Max: 98.6 (14 Apr 2023 15:30)  HR: 68 (15 Apr 2023 12:00) (61 - 87)  BP: 149/78 (15 Apr 2023 12:00) (114/62 - 149/78)  BP(mean): 107 (15 Apr 2023 12:00) (83 - 108)  RR: 16 (15 Apr 2023 12:00) (11 - 25)  SpO2: 96% (15 Apr 2023 12:00) (91% - 99%)    Parameters below as of 15 Apr 2023 12:00  Patient On (Oxygen Delivery Method): room air      I&O's Summary    14 Apr 2023 07:01  -  15 Apr 2023 07:00  --------------------------------------------------------  IN: 2913 mL / OUT: 1555 mL / NET: 1358 mL        LABS  LABS:                        8.9    4.36  )-----------( 408      ( 14 Apr 2023 20:00 )             26.7       04-14    140  |  101  |  5<L>  ----------------------------<  120<H>  3.5   |  28  |  0.6<L>    Ca    8.7      14 Apr 2023 20:00  Phos  3.0     04-14  Mg     1.9     04-14    TPro  5.6<L>  /  Alb  2.9<L>  /  TBili  0.7  /  DBili  0.3  /  AST  49<H>  /  ALT  86<H>  /  AlkPhos  636<H>  04-14          Assessment and Plan:   · Assessment	  63y Female w/ hx of IPMN s/p pylorus-preserving Whipple (4/3/23), melanotic stool, tachy, febrile, upgrade for possible cholangitis    NEURO:  #Acute pain    -APAP 650mg q6hrs     -Inpatient Rx: HYDROmorphone  Injectable 0.5 milliGRAM(s) IV Push every 6 hours PRN    RESP:   #Oxygen insufficiency     -wean off NC to RA as tolerated    -encourage IS; 750 on IS today    CARDS:   #HTN    -Rx-losartan 100 daily (held)  #Imaging/Labs    -EKG (4/9): NSR; inferior infarct age indeterminate  - Mild chest pressure overnight, EKG this AM without concern for ACS, nml interval changes (except CO 150ms, no signs of higher level heart block)    GI/NUTR:   #s/p Whipple, RLQ JANICE in place   -JANICE with serosanginous OP  #Elevated LFTs (downtrending)    #Melanotic stool    -GI following, s/p EGD, if any more melanotic stool will re-eval the HJ anastomosis; EGD next week; recommending RUQ U/S    -EDG 4/10: Multiple small (<1 cm) clean based ulcers in the antrum. A marginal, circumferential anastomotic ulcer measuring around 3 cm was seen at the duodeno-jejunotomy with surrounding edema, stigmata of recent bleeding, and visible surgical sutures. There was no evidence of active bleeding. The efferent limb was examined and had no obstruction but had multiple small (< 1  cm) clean based ulcers. The EGD scope was then exchanged to a PCF colonoscope and the afferent limb was examined with bile seen and no evidence of bleeding. The plastic CBD stent was noted to be in position.  -episode of melantoic stool 4/15    -Monitor Hgb    -PPI bid, carafate   #Diet, CLD    -aspiration precautions, HOB 30  #GI Prophylaxis    -PPI BID  #Bowel regimen- held    -Very small volume melena on 4/12 and then 4/14.     /RENAL:   #urine output in critically ill    -voiding  -IV lock    Labs:          BUN/Cr- 5/0.6  -->,  5/0.6  -->          Electrolytes-Na 140 // K 3.5 // Mg 1.9 //  Phos 3.0 (04-14 @ 20:00)    #maintain euvolemia    HEME/ONC:   #DVT prophylaxis    -lovenox 40mg daily, SCDs    Labs: Hb/Hct:  6.9/21.5  -->,1 U PRBC--> 8.9/26.7  -->                      Plts:  410  -->,  408  -->                 PTT/INR:      T&S Expires: 4/18  Blood Consent in chart       ID:  #leukocytosis     -WBC- 9.49  --->>,  9.01  --->>,  9.62  --->>    -Temp trend- 24hrs T(F): 99.2 (04-12 @ 23:20), Max: 100.2 (04-12 @ 20:00)    -Antibiotics-piperacillin/tazobactam IVPB.. 3.375 every 8 hours until 4/16    -ID following; per ID switch to PO levaquin 500mg daily + flagyl PO 500mg TID after zosyn course    -Cultures:    (collected 04-11 @ 13:02)  Source: .Blood Blood-Peripheral  Preliminary Report:  No growth to date.     (collected 04-11 @ 11:00)  Source: .Blood Blood-Peripheral  Preliminary Report: No growth to date.    ENDO:    -FSG q6 if NPO or Tube feeds    -Glucose goal 140-180    -if above 180 start ISS     -HA1C (3/13): 7.1    MSK:     Activity - Ambulate as Tolerated:     Time/Priority:  Routine (04-09-23 @ 18:46)  Patient walking multiple laps around unit w/o difficulty    LINES/DRAINS:  PIV    ADVANCED DIRECTIVES:  Full Code    HCP/Emergency Contact-    INDICATION FOR SICU: Cholangitis    DISPO: DOWNGRADE; approved by Dr. Umanzor              Follow Up:  -11:00 and 20:00 labs   -Zosyn end tomorrow 4/16;  switch to PO levaquin 500mg daily + flagyl PO 500mg TID after zosyn course  -repeat EGD with GI next week or earlier based on clinical status  -[facility] U/S       Signed out to:  Date:  Time: SICU Transfer Note    JEREMIAS IVEY  63y (1960)  [id number]      Transfer from: SICU   Transfer to: Surgery     SICU COURSE:  63y F  POD #12 s/p Pancreaticoduodenectomy on 4/3/2023 and d/c on 4/8, presented for lightheadedness and recent episodes of melanotic stools (last 4/9 in afternoon). Denies fevers/chills, chest pain, shortness of breath, syncope. She states that before admission she had an episode of nausea and retching without vomiting and increasing abdominal pain.  In ED, Hgb remains at 7.2, LFTs elevated Tbili 3.3 now 1.2, Alk phos 1146, now 952. CT was unremarkable, GI c/s, EGD done 4/10 to evaluate the GJ anastomosis no active bleeding noted, old blood noted, negative for obstruction. Pt was tachycardic on the floor 110s, tmax 100.8, bolused 500cc LR, started IVF, zosyn given c/f cholangitis. Pt was upgraded for monitoring in setting of possible cholangitis.    SICU COURSE:  While in SICU, patient received 2U PRBC. First unit was on 4/11 for Hgb 6.7, second unit 4/14 for Hgb 6.9; hemoglobin responded to both transfusions. had 2 episodes of melena; last episode 4/15 at 10AM.  Plan per Dr. Blanco is to obtain repeat EGD with GI next week, earlier if additional episodes of melena. Patient's LFTs, lipase, and amylase were trended; now downtrending. Patient has been tolerating clear diet. JANICE drain output has been serosanginous. Patient has been on Zosyn for intra-abdominal infection ppx; per ID can dc on 4/16 and transition to PO levaquin and flagyl. Patient has been mentating well, adequately voiding, non-tachycardic, with stable BP, and has been ambulating daily. Per Dr. Umanzor on AM rounds, patient approved for downgrade.           PAST MEDICAL & SURGICAL HISTORY:  HTN (hypertension)      High cholesterol      Diabetes      COPD (chronic obstructive pulmonary disease)      Kidney stones      No significant past surgical history        Allergies    No Known Allergies    Intolerances      MEDICATIONS  (STANDING):  acetaminophen     Tablet .. 650 milliGRAM(s) Oral every 6 hours  chlorhexidine 2% Cloths 1 Application(s) Topical <User Schedule>  enoxaparin Injectable 40 milliGRAM(s) SubCutaneous every 24 hours  insulin lispro (ADMELOG) corrective regimen sliding scale   SubCutaneous Before meals and at bedtime  losartan 100 milliGRAM(s) Oral daily  magnesium sulfate  IVPB 2 Gram(s) IV Intermittent once  pantoprazole  Injectable 40 milliGRAM(s) IV Push every 12 hours  piperacillin/tazobactam IVPB.. 3.375 Gram(s) IV Intermittent every 8 hours  potassium chloride  20 mEq/100 mL IVPB 20 milliEquivalent(s) IV Intermittent every 2 hours  sucralfate suspension 1 Gram(s) Oral every 6 hours    MEDICATIONS  (PRN):  HYDROmorphone  Injectable 0.5 milliGRAM(s) IV Push every 6 hours PRN Severe Pain (7 - 10)      Vital Signs Last 24 Hrs  T(C): 36.3 (15 Apr 2023 12:00), Max: 37 (14 Apr 2023 15:30)  T(F): 97.4 (15 Apr 2023 12:00), Max: 98.6 (14 Apr 2023 15:30)  HR: 68 (15 Apr 2023 12:00) (61 - 87)  BP: 149/78 (15 Apr 2023 12:00) (114/62 - 149/78)  BP(mean): 107 (15 Apr 2023 12:00) (83 - 108)  RR: 16 (15 Apr 2023 12:00) (11 - 25)  SpO2: 96% (15 Apr 2023 12:00) (91% - 99%)    Parameters below as of 15 Apr 2023 12:00  Patient On (Oxygen Delivery Method): room air      I&O's Summary    14 Apr 2023 07:01  -  15 Apr 2023 07:00  --------------------------------------------------------  IN: 2913 mL / OUT: 1555 mL / NET: 1358 mL        LABS  LABS:                        8.9    4.36  )-----------( 408      ( 14 Apr 2023 20:00 )             26.7       04-14    140  |  101  |  5<L>  ----------------------------<  120<H>  3.5   |  28  |  0.6<L>    Ca    8.7      14 Apr 2023 20:00  Phos  3.0     04-14  Mg     1.9     04-14    TPro  5.6<L>  /  Alb  2.9<L>  /  TBili  0.7  /  DBili  0.3  /  AST  49<H>  /  ALT  86<H>  /  AlkPhos  636<H>  04-14          Assessment and Plan:   · Assessment	  63y Female w/ hx of IPMN s/p pylorus-preserving Whipple (4/3/23), melanotic stool, tachy, febrile, upgrade for possible cholangitis    NEURO:  #Acute pain    -APAP 650mg q6hrs     -Inpatient Rx: HYDROmorphone  Injectable 0.5 milliGRAM(s) IV Push every 6 hours PRN    RESP:   #Oxygen insufficiency     -wean off NC to RA as tolerated    -encourage IS; 750 on IS today    CARDS:   #HTN    -Rx-losartan 100 daily (held)  #Imaging/Labs    -EKG (4/9): NSR; inferior infarct age indeterminate  - Mild chest pressure overnight, EKG this AM without concern for ACS, nml interval changes (except TX 150ms, no signs of higher level heart block)    GI/NUTR:   #s/p Whipple, RLQ JANICE in place   -JANICE with serosanginous OP  #Elevated LFTs (downtrending)    #Melanotic stool    -GI following, s/p EGD, if any more melanotic stool will re-eval the HJ anastomosis; EGD next week; recommending RUQ U/S    -EDG 4/10: Multiple small (<1 cm) clean based ulcers in the antrum. A marginal, circumferential anastomotic ulcer measuring around 3 cm was seen at the duodeno-jejunotomy with surrounding edema, stigmata of recent bleeding, and visible surgical sutures. There was no evidence of active bleeding. The efferent limb was examined and had no obstruction but had multiple small (< 1  cm) clean based ulcers. The EGD scope was then exchanged to a PCF colonoscope and the afferent limb was examined with bile seen and no evidence of bleeding. The plastic CBD stent was noted to be in position.  -episode of melantoic stool 4/15    -Monitor Hgb    -PPI bid, carafate   #Diet, CLD    -aspiration precautions, HOB 30  #GI Prophylaxis    -PPI BID  #Bowel regimen- held    -Very small volume melena on 4/12 and then 4/14.     /RENAL:   #urine output in critically ill    -voiding  -IV lock    Labs:          BUN/Cr- 5/0.6  -->,  5/0.6  -->          Electrolytes-Na 140 // K 3.5 // Mg 1.9 //  Phos 3.0 (04-14 @ 20:00)    #maintain euvolemia    HEME/ONC:   #DVT prophylaxis    -lovenox 40mg daily, SCDs    Labs: Hb/Hct:  6.9/21.5  -->,1 U PRBC--> 8.9/26.7  -->                      Plts:  410  -->,  408  -->                 PTT/INR:      T&S Expires: 4/18  Blood Consent in chart       ID:  #leukocytosis     -WBC- 9.49  --->>,  9.01  --->>,  9.62  --->>    -Temp trend- 24hrs T(F): 99.2 (04-12 @ 23:20), Max: 100.2 (04-12 @ 20:00)    -Antibiotics-piperacillin/tazobactam IVPB.. 3.375 every 8 hours until 4/16    -ID following; per ID switch to PO levaquin 500mg daily + flagyl PO 500mg TID after zosyn course    -Cultures:    (collected 04-11 @ 13:02)  Source: .Blood Blood-Peripheral  Preliminary Report:  No growth to date.     (collected 04-11 @ 11:00)  Source: .Blood Blood-Peripheral  Preliminary Report: No growth to date.    ENDO:    -FSG q6 if NPO or Tube feeds    -Glucose goal 140-180    -if above 180 start ISS     -HA1C (3/13): 7.1    MSK:     Activity - Ambulate as Tolerated:     Time/Priority:  Routine (04-09-23 @ 18:46)  Patient walking multiple laps around unit w/o difficulty    LINES/DRAINS:  PIV    ADVANCED DIRECTIVES:  Full Code    HCP/Emergency Contact-    INDICATION FOR SICU: Cholangitis    DISPO: DOWNGRADE; approved by Dr. Umanzor              Follow Up:  -11:00 and 20:00 labs   -Zosyn end tomorrow 4/16;  switch to PO levaquin 500mg daily + flagyl PO 500mg TID after zosyn course  -repeat EGD with GI next week or earlier based on clinical status  -RU U/S       Signed out to: Darcie Bates PA-C  Date: 4/15/23  Time: 13:41 SICU Transfer Note    JEREMIAS IVEY  63y (1960)  [id number]      Transfer from: SICU   Transfer to: Surgery     SICU COURSE:  63y F  POD #12 s/p Pancreaticoduodenectomy on 4/3/2023 and d/c on 4/8, presented for lightheadedness and recent episodes of melanotic stools (last 4/9 in afternoon). Denies fevers/chills, chest pain, shortness of breath, syncope. She states that before admission she had an episode of nausea and retching without vomiting and increasing abdominal pain.  In ED, Hgb remains at 7.2, LFTs elevated Tbili 3.3 now 1.2, Alk phos 1146, now 952. CT was unremarkable, GI c/s, EGD done 4/10 to evaluate the GJ anastomosis no active bleeding noted, old blood noted, negative for obstruction. Pt was tachycardic on the floor 110s, tmax 100.8, bolused 500cc LR, started IVF, zosyn given c/f cholangitis. Pt was upgraded for monitoring in setting of possible cholangitis.    SICU COURSE:  While in SICU, patient received 2U PRBC. First unit was on 4/11 for Hgb 6.7, second unit 4/14 for Hgb 6.9; hemoglobin responded to both transfusions. had 2 episodes of melena; last episode 4/15 at 10AM.  Plan per Dr. Blanco is to obtain repeat EGD with GI next week, earlier if additional episodes of melena. Patient's LFTs, lipase, and amylase were trended; now downtrending. Patient has been tolerating clear diet. JANICE drain output has been serosanginous. Patient has been on Zosyn for intra-abdominal infection ppx; per ID can dc on 4/16 and transition to PO levaquin and flagyl. Patient has been mentating well, adequately voiding, non-tachycardic, with stable BP, and has been ambulating daily. Per Dr. Umanzor on AM rounds, patient approved for downgrade.           PAST MEDICAL & SURGICAL HISTORY:  HTN (hypertension)      High cholesterol      Diabetes      COPD (chronic obstructive pulmonary disease)      Kidney stones      No significant past surgical history        Allergies    No Known Allergies    Intolerances      MEDICATIONS  (STANDING):  acetaminophen     Tablet .. 650 milliGRAM(s) Oral every 6 hours  chlorhexidine 2% Cloths 1 Application(s) Topical <User Schedule>  enoxaparin Injectable 40 milliGRAM(s) SubCutaneous every 24 hours  insulin lispro (ADMELOG) corrective regimen sliding scale   SubCutaneous Before meals and at bedtime  losartan 100 milliGRAM(s) Oral daily  magnesium sulfate  IVPB 2 Gram(s) IV Intermittent once  pantoprazole  Injectable 40 milliGRAM(s) IV Push every 12 hours  piperacillin/tazobactam IVPB.. 3.375 Gram(s) IV Intermittent every 8 hours  potassium chloride  20 mEq/100 mL IVPB 20 milliEquivalent(s) IV Intermittent every 2 hours  sucralfate suspension 1 Gram(s) Oral every 6 hours    MEDICATIONS  (PRN):  HYDROmorphone  Injectable 0.5 milliGRAM(s) IV Push every 6 hours PRN Severe Pain (7 - 10)      Vital Signs Last 24 Hrs  T(C): 36.3 (15 Apr 2023 12:00), Max: 37 (14 Apr 2023 15:30)  T(F): 97.4 (15 Apr 2023 12:00), Max: 98.6 (14 Apr 2023 15:30)  HR: 68 (15 Apr 2023 12:00) (61 - 87)  BP: 149/78 (15 Apr 2023 12:00) (114/62 - 149/78)  BP(mean): 107 (15 Apr 2023 12:00) (83 - 108)  RR: 16 (15 Apr 2023 12:00) (11 - 25)  SpO2: 96% (15 Apr 2023 12:00) (91% - 99%)    Parameters below as of 15 Apr 2023 12:00  Patient On (Oxygen Delivery Method): room air      I&O's Summary    14 Apr 2023 07:01  -  15 Apr 2023 07:00  --------------------------------------------------------  IN: 2913 mL / OUT: 1555 mL / NET: 1358 mL        LABS  LABS:                        8.9    4.36  )-----------( 408      ( 14 Apr 2023 20:00 )             26.7       04-14    140  |  101  |  5<L>  ----------------------------<  120<H>  3.5   |  28  |  0.6<L>    Ca    8.7      14 Apr 2023 20:00  Phos  3.0     04-14  Mg     1.9     04-14    TPro  5.6<L>  /  Alb  2.9<L>  /  TBili  0.7  /  DBili  0.3  /  AST  49<H>  /  ALT  86<H>  /  AlkPhos  636<H>  04-14          Assessment and Plan:   · Assessment	  63y Female w/ hx of IPMN s/p pylorus-preserving Whipple (4/3/23), melanotic stool, tachy, febrile, upgrade for possible cholangitis    NEURO:  #Acute pain    -APAP 650mg q6hrs     -Inpatient Rx: HYDROmorphone  Injectable 0.5 milliGRAM(s) IV Push every 6 hours PRN    RESP:   #Oxygen insufficiency     -wean off NC to RA as tolerated    -encourage IS; 750 on IS today    CARDS:   #HTN    -Rx-losartan 100 daily  #Imaging/Labs    -EKG (4/9): NSR; inferior infarct age indeterminate  - Mild chest pressure overnight, EKG this AM without concern for ACS, nml interval changes (except [place] 150ms, no signs of higher level heart block)    GI/NUTR:   #s/p Whipple, RLQ JANICE in place   -JANICE with serosanginous OP  #Elevated LFTs (downtrending)    #Melanotic stool    -GI following, s/p EGD, if any more melanotic stool will re-eval the HJ anastomosis; EGD next week; recommending RUQ U/S    -EDG 4/10: Multiple small (<1 cm) clean based ulcers in the antrum. A marginal, circumferential anastomotic ulcer measuring around 3 cm was seen at the duodeno-jejunotomy with surrounding edema, stigmata of recent bleeding, and visible surgical sutures. There was no evidence of active bleeding. The efferent limb was examined and had no obstruction but had multiple small (< 1  cm) clean based ulcers. The EGD scope was then exchanged to a PCF colonoscope and the afferent limb was examined with bile seen and no evidence of bleeding. The plastic CBD stent was noted to be in position.  -episode of melantoic stool 4/15    -Monitor Hgb    -PPI bid, carafate   #Diet, Full Liquid + Ensures    -aspiration precautions, HOB 30  #GI Prophylaxis    -PPI BID  #Bowel regimen- held    -Very small volume melena on 4/12 and then 4/14.     /RENAL:   #urine output in critically ill    -voiding  -IV lock    Labs:          BUN/Cr- 5/0.6  -->,  5/0.6  -->          Electrolytes-Na 140 // K 3.5 // Mg 1.9 //  Phos 3.0 (04-14 @ 20:00)    #maintain euvolemia    HEME/ONC:   #DVT prophylaxis    -lovenox 40mg daily, SCDs    Labs: Hb/Hct:  8.9/26.7  -->,  9.3/27.6  -->                      Plts:  408  -->,  446  -->                 PTT/INR:      T&S Expires: 4/18  Blood Consent in chart       ID:  #leukocytosis     -WBC- 9.49  --->>,  9.01  --->>,  9.62  --->>    -Temp trend- 24hrs T(F): 99.2 (04-12 @ 23:20), Max: 100.2 (04-12 @ 20:00)    -Antibiotics-piperacillin/tazobactam IVPB.. 3.375 every 8 hours until 4/16    -ID following; per ID switch to PO levaquin 500mg daily + flagyl PO 500mg TID after zosyn course    -Cultures:    (collected 04-11 @ 13:02)  Source: .Blood Blood-Peripheral  Preliminary Report:  No growth to date.     (collected 04-11 @ 11:00)  Source: .Blood Blood-Peripheral  Preliminary Report: No growth to date.    ENDO:    -FSG q6 if NPO or Tube feeds    -Glucose goal 140-180    -if above 180 start ISS     -HA1C (3/13): 7.1    MSK:     Activity - Ambulate as Tolerated:     Time/Priority:  Routine (04-09-23 @ 18:46)  Patient walking multiple laps around unit w/o difficulty    LINES/DRAINS:  PIV    ADVANCED DIRECTIVES:  Full Code    INDICATION FOR SICU: Cholangitis    DISPO: DOWNGRADE; approved by Dr. Umanzor              Follow Up:  -11:00 and 20:00 labs   -Zosyn end tomorrow 4/16;  switch to PO levaquin 500mg daily + flagyl PO 500mg TID after zosyn course  -repeat EGD with GI next week or earlier based on clinical status  -RUQ U/S       Signed out to: Darcie Bates PA-C  Date: 4/15/23  Time: 13:41

## 2023-04-15 NOTE — PROGRESS NOTE ADULT - NUTRITIONAL ASSESSMENT
This patient has been assessed with a concern for Malnutrition and has been determined to have a diagnosis/diagnoses of Moderate protein-calorie malnutrition.    This patient is being managed with:   Diet Clear Liquid-  Consistent Carbohydrate {No Snacks}  DASH/TLC {Sodium & Cholesterol Restricted}  Prosource Gelatein 20 Sugar Free     Qty per Day:  3  Supplement Feeding Modality:  Oral  Ensure Clear Cans or Servings Per Day:  1       Frequency:  Daily  Entered: Apr 12 2023  2:35PM  

## 2023-04-15 NOTE — PROCEDURE NOTE - NSICDXPROCEDURE_GEN_ALL_CORE_FT
PROCEDURES:  Midline catheter insertion 15-Apr-2023 17:01:15 UItrasound guided right cephalic vein midline catheter insertion Serge Ji

## 2023-04-15 NOTE — PROGRESS NOTE ADULT - SUBJECTIVE AND OBJECTIVE BOX
HPI   63y F  POD #9 s/p Pancreaticoduodenectomy on 4/3/2023 and d/c on 4/8, presented for lightheadedness and recent episodes of melanotic stools (last 4/9 in afternoon). Denies fevers/chills, chest pain, shortness of breath, syncope. She states that before admission she had an episode of nausea and retching without vomiting and increasing abdominal pain.  In ED, Hgb remains at 7.2, LFTs elevated Tbili 3.3 now 1.2, Alk phos 1146, now 952. CT was unremarkable, GI c/s, EGD done 4/10 to evaluate the GJ anastomosis no active bleeding noted, old blood noted, negative for obstruction. Pt was tachycardic on the floor 110s, tmax 100.8, bolused 500cc LR, started IVF, zosyn given c/f cholangitis.   PMH  PAST MEDICAL & SURGICAL HISTORY:  HTN (hypertension)  High cholesterol  Diabetes  COPD (chronic obstructive pulmonary disease)  Kidney stones  No significant past surgical history    Home Meds:  Home Medications:  acetaminophen 325 mg oral tablet: 2 tab(s) orally every 6 hours as needed for  pain (08 Apr 2023 10:59)  Benicar: orally once a day (03 Apr 2023 06:33)  Lipitor: orally once a day (03 Apr 2023 06:33)  metFORMIN:  (03 Apr 2023 06:33)    Allergies  No Known Allergies     24 Hour Events  4/14   DAY  - per ID if blood cx neg, dc zosyn, transition to PO levaquin 500mg daily + flagyl PO   -dg 500mg TID  -small volume black tarry stool @ 10AM --> Dr. Blanco notified and aware, still plan for EGD w/ GI next week, if significant amounts of melena stool then will obtain earlier    NIGHT  YANIQUE   HPI   63y F  POD #9 s/p Pancreaticoduodenectomy on 4/3/2023 and d/c on 4/8, presented for lightheadedness and recent episodes of melanotic stools (last 4/9 in afternoon). Denies fevers/chills, chest pain, shortness of breath, syncope. She states that before admission she had an episode of nausea and retching without vomiting and increasing abdominal pain.  In ED, Hgb remains at 7.2, LFTs elevated Tbili 3.3 now 1.2, Alk phos 1146, now 952. CT was unremarkable, GI c/s, EGD done 4/10 to evaluate the GJ anastomosis no active bleeding noted, old blood noted, negative for obstruction. Pt was tachycardic on the floor 110s, tmax 100.8, bolused 500cc LR, started IVF, zosyn given c/f cholangitis.   PMH  PAST MEDICAL & SURGICAL HISTORY:  HTN (hypertension)  High cholesterol  Diabetes  COPD (chronic obstructive pulmonary disease)  Kidney stones  No significant past surgical history    Home Meds:  Home Medications:  acetaminophen 325 mg oral tablet: 2 tab(s) orally every 6 hours as needed for  pain (08 Apr 2023 10:59)  Benicar: orally once a day (03 Apr 2023 06:33)  Lipitor: orally once a day (03 Apr 2023 06:33)  metFORMIN:  (03 Apr 2023 06:33)    Allergies  No Known Allergies     24 Hour Events  4/14   DAY  - per ID if blood cx neg, dc zosyn, transition to PO levaquin 500mg daily + flagyl PO   -dg 500mg TID  -small volume black tarry stool @ 10AM --> Dr. Blanco notified and aware, still plan for EGD w/ GI next week, if significant amounts of melena stool then will obtain earlier    NIGHT  YANIQUE  Daily     Daily       [X] A ten-point review of systems was otherwise negative except as noted above.  [  ] Due to altered mental status/intubation, subjective information was not attained from the patient. History was obtained, to the extent possible, from review of the chart and collateral sources of information.    =========================================================================================================================================      Diet, Clear Liquid:   Consistent Carbohydrate No Snacks  DASH/TLC Sodium & Cholesterol Restricted  Prosource Gelatein 20 Sugar Free     Qty per Day:  3  Supplement Feeding Modality:  Oral  Ensure Clear Cans or Servings Per Day:  1       Frequency:  Daily (04-12-23 @ 14:36)      CURRENT MEDS:  Neurologic Medications  acetaminophen     Tablet .. 650 milliGRAM(s) Oral every 6 hours  HYDROmorphone  Injectable 0.5 milliGRAM(s) IV Push every 6 hours PRN Severe Pain (7 - 10)    Respiratory Medications    Cardiovascular Medications  losartan 100 milliGRAM(s) Oral daily    Gastrointestinal Medications  lactated ringers. 1000 milliLiter(s) IV Continuous <Continuous>  pantoprazole  Injectable 40 milliGRAM(s) IV Push every 12 hours  sucralfate suspension 1 Gram(s) Oral every 6 hours    Genitourinary Medications    Hematologic/Oncologic Medications  enoxaparin Injectable 40 milliGRAM(s) SubCutaneous every 24 hours    Antimicrobial/Immunologic Medications  piperacillin/tazobactam IVPB.. 3.375 Gram(s) IV Intermittent every 8 hours    Endocrine/Metabolic Medications  insulin lispro (ADMELOG) corrective regimen sliding scale   SubCutaneous Before meals and at bedtime    Topical/Other Medications  chlorhexidine 2% Cloths 1 Application(s) Topical <User Schedule>      ICU Vital Signs Last 24 Hrs  T(C): 36.9 (15 Apr 2023 04:00), Max: 37 (14 Apr 2023 15:30)  T(F): 98.5 (15 Apr 2023 04:00), Max: 98.6 (14 Apr 2023 15:30)  HR: 66 (15 Apr 2023 08:00) (61 - 87)  BP: 143/81 (15 Apr 2023 08:00) (114/62 - 150/70)  BP(mean): 106 (15 Apr 2023 08:00) (83 - 108)  ABP: --  ABP(mean): --  RR: 16 (15 Apr 2023 08:00) (11 - 25)  SpO2: 95% (15 Apr 2023 08:00) (91% - 99%)    O2 Parameters below as of 15 Apr 2023 08:00  Patient On (Oxygen Delivery Method): room air                    I&O's Summary    14 Apr 2023 07:01  -  15 Apr 2023 07:00  --------------------------------------------------------  IN: 2913 mL / OUT: 1555 mL / NET: 1358 mL      I&O's Detail    14 Apr 2023 07:01  -  15 Apr 2023 07:00  --------------------------------------------------------  IN:    IV PiggyBack: 350 mL    Lactated Ringers: 1100 mL    Oral Fluid: 1120 mL    PRBCs (Packed Red Blood Cells): 343 mL  Total IN: 2913 mL    OUT:    Bulb (mL): 30 mL    Voided (mL): 1525 mL  Total OUT: 1555 mL    Total NET: 1358 mL          PHYSICAL EXAM:    General/Neuro: A&O X 4. NAD. Follows commands.   Lungs: Clear to auscultation, Normal expansion/effort. 750 on IS.  Cardiovascular : S1, S2.  Regular rate and rhythm.    GI: Abdomen soft, Non-tender, Slightly distended. RLQ JANICE with serosanginous OP.    Extremities: Extremities warm, pink, well-perfused.     Derm: Good skin turgor, no skin breakdown.      : No harper.       LABS:  CAPILLARY BLOOD GLUCOSE      POCT Blood Glucose.: 137 mg/dL (15 Apr 2023 06:01)  POCT Blood Glucose.: 124 mg/dL (14 Apr 2023 21:06)  POCT Blood Glucose.: 160 mg/dL (14 Apr 2023 16:23)  POCT Blood Glucose.: 169 mg/dL (14 Apr 2023 11:38)                          8.9    4.36  )-----------( 408      ( 14 Apr 2023 20:00 )             26.7       04-14    140  |  101  |  5<L>  ----------------------------<  120<H>  3.5   |  28  |  0.6<L>    Ca    8.7      14 Apr 2023 20:00  Phos  3.0     04-14  Mg     1.9     04-14    TPro  5.6<L>  /  Alb  2.9<L>  /  TBili  0.7  /  DBili  0.3  /  AST  49<H>  /  ALT  86<H>  /  AlkPhos  636<H>  04-14

## 2023-04-15 NOTE — PROGRESS NOTE ADULT - ASSESSMENT
A/P 63yFemale with hx of Pancreatic head IPMN, s/p pancreaticoduodenectomy, presenting with Postoperative pain, and melanotic stools. She is s/p EGD with findings consistent for previous bleeding, afferent and efferent limbs of the GJ patent.  s/p blood transfusion , on IV antibiotic,  Diet advanced. pt downgraded to floor  continue current care  f/u CBC   f/u RUQ US  F/U GI for repeat EGD next week  encourage incentive spirometry use and ambulation   continue close monitoring     A/P 63yFemale with hx of Pancreatic head IPMN, s/p pancreaticoduodenectomy, presenting with Postoperative pain, and melanotic stools. She is s/p EGD 4/10 revealing a marginal ulcer at the duodenojejunal anastomosis, and multiple small clean based gastric ulcers   s/p blood transfusion , on IV antibiotic,  Diet advanced. pt downgraded to floor  continue current care  f/u CBC   f/u RUQ US  F/U GI for repeat EGD next week  encourage incentive spirometry use and ambulation   continue close monitoring

## 2023-04-15 NOTE — PROGRESS NOTE ADULT - ASSESSMENT
63y Female w/ hx of IPMN s/p pylorus-preserving Whipple (4/3/23), melanotic stool, tachy, febrile, upgrade for possible cholangitis    NEURO:  #Acute pain    -APAP 650mg q6hrs     -Inpatient Rx: HYDROmorphone  Injectable 0.5 milliGRAM(s) IV Push every 6 hours PRN    RESP:   #Oxygen insufficiency     -wean off NC to RA as tolerated    -encourage IS     CARDS:   #HTN    -Rx-losartan 100 daily (held)  #Imaging/Labs    -EKG (4/9): NSR; inferior infarct age indeterminate  - Mild chest pressure overnight, EKG this AM without concern for ACS, nml interval changes (except [place] 150ms, no signs of higher level heart block)    GI/NUTR:   #POD #8 s/p Whipple, RLQ JANICE, prevena in place   #Elevated LFTs (downtrending)    #Melanotic stool    -GI following, s/p EGD, if any more melanotic stool will re-eval the HJ anastomosis     -EDG 4/10: Multiple small (<1 cm) clean based ulcers in the antrum. A marginal, circumferential anastomotic ulcer measuring around 3 cm was seen at the duodeno-jejunotomy with surrounding edema, stigmata of recent bleeding, and visible surgical sutures. There was no evidence of active bleeding. The efferent limb was examined and had no obstruction but had multiple small (< 1  cm) clean based ulcers. The EGD scope was then exchanged to a PCF colonoscope and the afferent limb was examined with bile seen and no evidence of bleeding. The plastic CBD stent was noted to be in position.    -Monitor Hgb    -PPI bid, carafate   #Diet, CLD    -aspiration precautions, HOB 30  #GI Prophylaxis    -PPI BID  #Bowel regimen- held    -Very small volume melena on 4/12 and then 4/14.     /RENAL:   #urine output in critically ill    -voiding    LR @50cc, IV lock when pt takes sufficient PO intake    Labs:          BUN/Cr- 6/0.5  -->,  5/0.5  -->          Electrolytes-Na 138 // K 4.1 // Mg 1.7 //  Phos 3.7 (04-12 @ 21:15)  #maintain euvolemia    HEME/ONC:   #DVT prophylaxis    -lovenox 40mg daily, SCDs    Labs: Hb/Hct:  7.5/22.6  -->,  7.4/22.2  -->                      Plts:  289  -->,  321  -->                 PTT/INR:     T&S Expires: 4/14  Blood Consent in chart   #DVT prophylaxis    -enoxaparin Injectable    ID:  #leukocytosis     -WBC- 9.49  --->>,  9.01  --->>,  9.62  --->>    -Temp trend- 24hrs T(F): 99.2 (04-12 @ 23:20), Max: 100.2 (04-12 @ 20:00)    -Antibiotics-piperacillin/tazobactam IVPB.. 3.375 every 8 hours    -ID following     -Cultures:    (collected 04-11 @ 13:02)  Source: .Blood Blood-Peripheral  Preliminary Report:  No growth to date.     (collected 04-11 @ 11:00)  Source: .Blood Blood-Peripheral  Preliminary Report: No growth to date.    ENDO:    -FSG q6 if NPO or Tube feeds    -Glucose goal 140-180    -if above 180 start ISS     -HA1C (3/13): 7.1    MSK:     Activity - Ambulate as Tolerated:     Time/Priority:  Routine (04-09-23 @ 18:46)  Patient walking multiple laps around unit w/o difficulty    LINES/DRAINS:  PIV    ADVANCED DIRECTIVES:  Full Code    HCP/Emergency Contact-    INDICATION FOR SICU: Cholangitis    DISPO: SICU vs SDU vs downgrade 63y Female w/ hx of IPMN s/p pylorus-preserving Whipple (4/3/23), melanotic stool, tachy, febrile, upgrade for possible cholangitis    NEURO:  #Acute pain    -APAP 650mg q6hrs     -Inpatient Rx: HYDROmorphone  Injectable 0.5 milliGRAM(s) IV Push every 6 hours PRN    RESP:   #Oxygen insufficiency     -wean off NC to RA as tolerated    -encourage IS; 750 on IS today    CARDS:   #HTN    -Rx-losartan 100 daily (held)  #Imaging/Labs    -EKG (4/9): NSR; inferior infarct age indeterminate  - Mild chest pressure overnight, EKG this AM without concern for ACS, nml interval changes (except [place] 150ms, no signs of higher level heart block)    GI/NUTR:   #s/p Whipple, RLQ JANICE, prevena in place   #Elevated LFTs (downtrending)    #Melanotic stool    -GI following, s/p EGD, if any more melanotic stool will re-eval the HJ anastomosis     -EDG 4/10: Multiple small (<1 cm) clean based ulcers in the antrum. A marginal, circumferential anastomotic ulcer measuring around 3 cm was seen at the duodeno-jejunotomy with surrounding edema, stigmata of recent bleeding, and visible surgical sutures. There was no evidence of active bleeding. The efferent limb was examined and had no obstruction but had multiple small (< 1  cm) clean based ulcers. The EGD scope was then exchanged to a PCF colonoscope and the afferent limb was examined with bile seen and no evidence of bleeding. The plastic CBD stent was noted to be in position.  -episode of melantoic stool 4/15    -Monitor Hgb    -PPI bid, carafate   #Diet, CLD    -aspiration precautions, HOB 30  #GI Prophylaxis    -PPI BID  #Bowel regimen- held    -Very small volume melena on 4/12 and then 4/14.     /RENAL:   #urine output in critically ill    -voiding    LR @50cc, IV lock when pt takes sufficient PO intake    Labs:          BUN/Cr- 6/0.5  -->,  5/0.5  -->          Electrolytes-Na 138 // K 4.1 // Mg 1.7 //  Phos 3.7 (04-12 @ 21:15)  #maintain euvolemia    HEME/ONC:   #DVT prophylaxis    -lovenox 40mg daily, SCDs    Labs: Hb/Hct:  7.5/22.6  -->,  7.4/22.2  -->                      Plts:  289  -->,  321  -->                 PTT/INR:     T&S Expires: 4/14  Blood Consent in chart   #DVT prophylaxis    -enoxaparin Injectable    ID:  #leukocytosis     -WBC- 9.49  --->>,  9.01  --->>,  9.62  --->>    -Temp trend- 24hrs T(F): 99.2 (04-12 @ 23:20), Max: 100.2 (04-12 @ 20:00)    -Antibiotics-piperacillin/tazobactam IVPB.. 3.375 every 8 hours    -ID following     -Cultures:    (collected 04-11 @ 13:02)  Source: .Blood Blood-Peripheral  Preliminary Report:  No growth to date.     (collected 04-11 @ 11:00)  Source: .Blood Blood-Peripheral  Preliminary Report: No growth to date.    ENDO:    -FSG q6 if NPO or Tube feeds    -Glucose goal 140-180    -if above 180 start ISS     -HA1C (3/13): 7.1    MSK:     Activity - Ambulate as Tolerated:     Time/Priority:  Routine (04-09-23 @ 18:46)  Patient walking multiple laps around unit w/o difficulty    LINES/DRAINS:  PIV    ADVANCED DIRECTIVES:  Full Code    HCP/Emergency Contact-    INDICATION FOR SICU: Cholangitis    DISPO: SICU vs SDU vs downgrade 63y Female w/ hx of IPMN s/p pylorus-preserving Whipple (4/3/23), melanotic stool, tachy, febrile, upgrade for possible cholangitis    NEURO:  #Acute pain    -APAP 650mg q6hrs     -Inpatient Rx: HYDROmorphone  Injectable 0.5 milliGRAM(s) IV Push every 6 hours PRN    RESP:   #Oxygen insufficiency     -wean off NC to RA as tolerated    -encourage IS; 750 on IS today    CARDS:   #HTN    -Rx-losartan 100 daily (held)  #Imaging/Labs    -EKG (4/9): NSR; inferior infarct age indeterminate  - Mild chest pressure overnight, EKG this AM without concern for ACS, nml interval changes (except [place] 150ms, no signs of higher level heart block)    GI/NUTR:   #s/p Whipple, RLQ JANICE, prevena in place   #Elevated LFTs (downtrending)    #Melanotic stool    -GI following, s/p EGD, if any more melanotic stool will re-eval the HJ anastomosis     -EDG 4/10: Multiple small (<1 cm) clean based ulcers in the antrum. A marginal, circumferential anastomotic ulcer measuring around 3 cm was seen at the duodeno-jejunotomy with surrounding edema, stigmata of recent bleeding, and visible surgical sutures. There was no evidence of active bleeding. The efferent limb was examined and had no obstruction but had multiple small (< 1  cm) clean based ulcers. The EGD scope was then exchanged to a PCF colonoscope and the afferent limb was examined with bile seen and no evidence of bleeding. The plastic CBD stent was noted to be in position.  -episode of melantoic stool 4/15    -Monitor Hgb    -PPI bid, carafate   #Diet, CLD    -aspiration precautions, HOB 30  #GI Prophylaxis    -PPI BID  #Bowel regimen- held    -Very small volume melena on 4/12 and then 4/14.     /RENAL:   #urine output in critically ill    -voiding  -IV lock    Labs:          BUN/Cr- 5/0.6  -->,  5/0.6  -->          Electrolytes-Na 140 // K 3.5 // Mg 1.9 //  Phos 3.0 (04-14 @ 20:00)    #maintain euvolemia    HEME/ONC:   #DVT prophylaxis    -lovenox 40mg daily, SCDs     DVT prophylaxis-enoxaparin Injectable  , SCDs    Labs: Hb/Hct:  6.9/21.5  -->,1 U PRBC--> 8.9/26.7  -->                      Plts:  410  -->,  408  -->                 PTT/INR:      T&S Expires: 4/18  Blood Consent in chart   #DVT prophylaxis    -enoxaparin Injectable    ID:  #leukocytosis     -WBC- 9.49  --->>,  9.01  --->>,  9.62  --->>    -Temp trend- 24hrs T(F): 99.2 (04-12 @ 23:20), Max: 100.2 (04-12 @ 20:00)    -Antibiotics-piperacillin/tazobactam IVPB.. 3.375 every 8 hours    -ID following     -Cultures:    (collected 04-11 @ 13:02)  Source: .Blood Blood-Peripheral  Preliminary Report:  No growth to date.     (collected 04-11 @ 11:00)  Source: .Blood Blood-Peripheral  Preliminary Report: No growth to date.    ENDO:    -FSG q6 if NPO or Tube feeds    -Glucose goal 140-180    -if above 180 start ISS     -HA1C (3/13): 7.1    MSK:     Activity - Ambulate as Tolerated:     Time/Priority:  Routine (04-09-23 @ 18:46)  Patient walking multiple laps around unit w/o difficulty    LINES/DRAINS:  PIV    ADVANCED DIRECTIVES:  Full Code    HCP/Emergency Contact-    INDICATION FOR SICU: Cholangitis    DISPO: SICU vs SDU vs downgrade 63y Female w/ hx of IPMN s/p pylorus-preserving Whipple (4/3/23), melanotic stool, tachy, febrile, upgrade for possible cholangitis    NEURO:  #Acute pain    -APAP 650mg q6hrs     -Inpatient Rx: HYDROmorphone  Injectable 0.5 milliGRAM(s) IV Push every 6 hours PRN    RESP:   #Oxygen insufficiency     -wean off NC to RA as tolerated    -encourage IS; 750 on IS today    CARDS:   #HTN    -Rx-losartan 100 daily (held)  #Imaging/Labs    -EKG (4/9): NSR; inferior infarct age indeterminate  - Mild chest pressure overnight, EKG this AM without concern for ACS, nml interval changes (except [place] 150ms, no signs of higher level heart block)    GI/NUTR:   #s/p Whipple, RLQ JANICE in place   -JANICE with serosanginous OP  #Elevated LFTs (downtrending)    #Melanotic stool    -GI following, s/p EGD, if any more melanotic stool will re-eval the HJ anastomosis     -EDG 4/10: Multiple small (<1 cm) clean based ulcers in the antrum. A marginal, circumferential anastomotic ulcer measuring around 3 cm was seen at the duodeno-jejunotomy with surrounding edema, stigmata of recent bleeding, and visible surgical sutures. There was no evidence of active bleeding. The efferent limb was examined and had no obstruction but had multiple small (< 1  cm) clean based ulcers. The EGD scope was then exchanged to a PCF colonoscope and the afferent limb was examined with bile seen and no evidence of bleeding. The plastic CBD stent was noted to be in position.  -episode of melantoic stool 4/15    -Monitor Hgb    -PPI bid, carafate   #Diet, CLD    -aspiration precautions, HOB 30  #GI Prophylaxis    -PPI BID  #Bowel regimen- held    -Very small volume melena on 4/12 and then 4/14.     /RENAL:   #urine output in critically ill    -voiding  -IV lock    Labs:          BUN/Cr- 5/0.6  -->,  5/0.6  -->          Electrolytes-Na 140 // K 3.5 // Mg 1.9 //  Phos 3.0 (04-14 @ 20:00)    #maintain euvolemia    HEME/ONC:   #DVT prophylaxis    -lovenox 40mg daily, SCDs     DVT prophylaxis-enoxaparin Injectable  , SCDs    Labs: Hb/Hct:  6.9/21.5  -->,1 U PRBC--> 8.9/26.7  -->                      Plts:  410  -->,  408  -->                 PTT/INR:      T&S Expires: 4/18  Blood Consent in chart   #DVT prophylaxis    -enoxaparin Injectable    ID:  #leukocytosis     -WBC- 9.49  --->>,  9.01  --->>,  9.62  --->>    -Temp trend- 24hrs T(F): 99.2 (04-12 @ 23:20), Max: 100.2 (04-12 @ 20:00)    -Antibiotics-piperacillin/tazobactam IVPB.. 3.375 every 8 hours until 4/16    -ID following; per ID switch to PO levaquin 500mg daily + flagyl PO 500mg TID after zosyn course    -Cultures:    (collected 04-11 @ 13:02)  Source: .Blood Blood-Peripheral  Preliminary Report:  No growth to date.     (collected 04-11 @ 11:00)  Source: .Blood Blood-Peripheral  Preliminary Report: No growth to date.    ENDO:    -FSG q6 if NPO or Tube feeds    -Glucose goal 140-180    -if above 180 start ISS     -HA1C (3/13): 7.1    MSK:     Activity - Ambulate as Tolerated:     Time/Priority:  Routine (04-09-23 @ 18:46)  Patient walking multiple laps around unit w/o difficulty    LINES/DRAINS:  PIV    ADVANCED DIRECTIVES:  Full Code    HCP/Emergency Contact-    INDICATION FOR SICU: Cholangitis    DISPO: DOWNGRADE; approved by Dr. Umanzor

## 2023-04-15 NOTE — PROGRESS NOTE ADULT - SUBJECTIVE AND OBJECTIVE BOX
Procedure/ diagnosis : s/p pancreaticoduodenectomy, with abdominal pain and melena    POD#12    PAST MEDICAL & SURGICAL HISTORY:  HTN (hypertension)      High cholesterol      Diabetes      COPD (chronic obstructive pulmonary disease)      Kidney stones      No significant past surgical history          24H EVENTS    Vital Signs Last 24 Hrs  T(C): 36.3 (15 Apr 2023 12:00), Max: 37 (2023 15:30)  T(F): 97.4 (15 Apr 2023 12:00), Max: 98.6 (2023 15:30)  HR: 68 (15 Apr 2023 12:00) (61 - 87)  BP: 149/78 (15 Apr 2023 12:00) (114/62 - 149/78)  BP(mean): 107 (15 Apr 2023 12:00) (83 - 108)  RR: 16 (15 Apr 2023 12:00) (11 - 25)  SpO2: 96% (15 Apr 2023 12:00) (91% - 99%)    Parameters below as of 15 Apr 2023 12:00  Patient On (Oxygen Delivery Method): room air            I&O's Detail    2023 07:01  -  15 Apr 2023 07:00  --------------------------------------------------------  IN:    IV PiggyBack: 350 mL    Lactated Ringers: 1100 mL    Oral Fluid: 1120 mL    PRBCs (Packed Red Blood Cells): 343 mL  Total IN: 2913 mL    OUT:    Bulb (mL): 30 mL    Voided (mL): 1525 mL  Total OUT: 1555 mL    Total NET: 1358 mL                   9.3    4.50  )-----------( 446      ( 04-15 @ 12:43 )             27.6                8.9    4.36  )-----------( 408      (  @ 20:00 )             26.7                6.9    5.52  )-----------( 410      (  @ 11:45 )             21.5                7.5    6.17  )-----------( 317      (  @ 21:40 )             23.4                    142   |  102   |  5                  Ca: 8.5    BMP:   ----------------------------< 134    M.9   (04-15-23 @ 12:43)             3.9    |  28    | 0.5                Ph: 3.5      LFT:     TPro: 5.6 / Alb: 2.9 / TBili: 0.7 / DBili: 0.3 / AST: 49 / ALT: 86 / AlkPhos: 636   (23 @ 20:00)        MEDICATIONS  (STANDING):  acetaminophen     Tablet .. 650 milliGRAM(s) Oral every 6 hours  chlorhexidine 2% Cloths 1 Application(s) Topical <User Schedule>  enoxaparin Injectable 40 milliGRAM(s) SubCutaneous every 24 hours  insulin lispro (ADMELOG) corrective regimen sliding scale   SubCutaneous Before meals and at bedtime  losartan 100 milliGRAM(s) Oral daily  magnesium sulfate  IVPB 2 Gram(s) IV Intermittent once  magnesium sulfate  IVPB 2 Gram(s) IV Intermittent once  pantoprazole  Injectable 40 milliGRAM(s) IV Push every 12 hours  piperacillin/tazobactam IVPB.. 3.375 Gram(s) IV Intermittent every 8 hours  potassium chloride  20 mEq/100 mL IVPB 20 milliEquivalent(s) IV Intermittent once  potassium chloride  20 mEq/100 mL IVPB 20 milliEquivalent(s) IV Intermittent every 2 hours  sucralfate suspension 1 Gram(s) Oral every 6 hours    MEDICATIONS  (PRN):  HYDROmorphone  Injectable 0.5 milliGRAM(s) IV Push every 6 hours PRN Severe Pain (7 - 10)      Diet, Full Liquid:   Consistent Carbohydrate No Snacks  DASH/TLC Sodium & Cholesterol Restricted  Prosource Gelatein 20 Sugar Free     Qty per Day:  3  Supplement Feeding Modality:  Oral  Ensure Enlive Cans or Servings Per Day:  1       Frequency:  Daily (04-15-23 @ 14:04)      PHYSICAL EXAM:  General Appearance: pt in  NAD  Chest: Equal expansion bilaterally, equal breath sounds  CV: S1, S2  Abdomen: soft,  clean wound dressing in place,   no rebound tenderness no guarding , JANICE drain w SS output  Extremities: perfused  Neuro: A&Ox3

## 2023-04-16 ENCOUNTER — TRANSCRIPTION ENCOUNTER (OUTPATIENT)
Age: 63
End: 2023-04-16

## 2023-04-16 LAB
ALBUMIN SERPL ELPH-MCNC: 3.4 G/DL — LOW (ref 3.5–5.2)
ALP SERPL-CCNC: 618 U/L — HIGH (ref 30–115)
ALT FLD-CCNC: 66 U/L — HIGH (ref 0–41)
ANION GAP SERPL CALC-SCNC: 12 MMOL/L — SIGNIFICANT CHANGE UP (ref 7–14)
AST SERPL-CCNC: 37 U/L — SIGNIFICANT CHANGE UP (ref 0–41)
BASOPHILS # BLD AUTO: 0.02 K/UL — SIGNIFICANT CHANGE UP (ref 0–0.2)
BASOPHILS NFR BLD AUTO: 0.6 % — SIGNIFICANT CHANGE UP (ref 0–1)
BILIRUB DIRECT SERPL-MCNC: 0.2 MG/DL — SIGNIFICANT CHANGE UP (ref 0–0.3)
BILIRUB INDIRECT FLD-MCNC: 0.5 MG/DL — SIGNIFICANT CHANGE UP (ref 0.2–1.2)
BILIRUB SERPL-MCNC: 0.7 MG/DL — SIGNIFICANT CHANGE UP (ref 0.2–1.2)
BUN SERPL-MCNC: 7 MG/DL — LOW (ref 10–20)
CALCIUM SERPL-MCNC: 8.6 MG/DL — SIGNIFICANT CHANGE UP (ref 8.4–10.5)
CHLORIDE SERPL-SCNC: 102 MMOL/L — SIGNIFICANT CHANGE UP (ref 98–110)
CO2 SERPL-SCNC: 26 MMOL/L — SIGNIFICANT CHANGE UP (ref 17–32)
CREAT SERPL-MCNC: 0.6 MG/DL — LOW (ref 0.7–1.5)
CULTURE RESULTS: SIGNIFICANT CHANGE UP
CULTURE RESULTS: SIGNIFICANT CHANGE UP
EGFR: 101 ML/MIN/1.73M2 — SIGNIFICANT CHANGE UP
EOSINOPHIL # BLD AUTO: 0.06 K/UL — SIGNIFICANT CHANGE UP (ref 0–0.7)
EOSINOPHIL NFR BLD AUTO: 1.7 % — SIGNIFICANT CHANGE UP (ref 0–8)
GLUCOSE BLDC GLUCOMTR-MCNC: 127 MG/DL — HIGH (ref 70–99)
GLUCOSE BLDC GLUCOMTR-MCNC: 134 MG/DL — HIGH (ref 70–99)
GLUCOSE BLDC GLUCOMTR-MCNC: 141 MG/DL — HIGH (ref 70–99)
GLUCOSE BLDC GLUCOMTR-MCNC: 143 MG/DL — HIGH (ref 70–99)
GLUCOSE SERPL-MCNC: 131 MG/DL — HIGH (ref 70–99)
HCT VFR BLD CALC: 29.1 % — LOW (ref 37–47)
HGB BLD-MCNC: 9.6 G/DL — LOW (ref 12–16)
IMM GRANULOCYTES NFR BLD AUTO: 1.2 % — HIGH (ref 0.1–0.3)
LYMPHOCYTES # BLD AUTO: 1.09 K/UL — LOW (ref 1.2–3.4)
LYMPHOCYTES # BLD AUTO: 31.8 % — SIGNIFICANT CHANGE UP (ref 20.5–51.1)
MAGNESIUM SERPL-MCNC: 1.9 MG/DL — SIGNIFICANT CHANGE UP (ref 1.8–2.4)
MCHC RBC-ENTMCNC: 29.5 PG — SIGNIFICANT CHANGE UP (ref 27–31)
MCHC RBC-ENTMCNC: 33 G/DL — SIGNIFICANT CHANGE UP (ref 32–37)
MCV RBC AUTO: 89.5 FL — SIGNIFICANT CHANGE UP (ref 81–99)
MONOCYTES # BLD AUTO: 0.34 K/UL — SIGNIFICANT CHANGE UP (ref 0.1–0.6)
MONOCYTES NFR BLD AUTO: 9.9 % — HIGH (ref 1.7–9.3)
NEUTROPHILS # BLD AUTO: 1.88 K/UL — SIGNIFICANT CHANGE UP (ref 1.4–6.5)
NEUTROPHILS NFR BLD AUTO: 54.8 % — SIGNIFICANT CHANGE UP (ref 42.2–75.2)
NRBC # BLD: 0 /100 WBCS — SIGNIFICANT CHANGE UP (ref 0–0)
PHOSPHATE SERPL-MCNC: 3.3 MG/DL — SIGNIFICANT CHANGE UP (ref 2.1–4.9)
PLATELET # BLD AUTO: 455 K/UL — HIGH (ref 130–400)
PMV BLD: 9.3 FL — SIGNIFICANT CHANGE UP (ref 7.4–10.4)
POTASSIUM SERPL-MCNC: 4 MMOL/L — SIGNIFICANT CHANGE UP (ref 3.5–5)
POTASSIUM SERPL-SCNC: 4 MMOL/L — SIGNIFICANT CHANGE UP (ref 3.5–5)
PROT SERPL-MCNC: 6.1 G/DL — SIGNIFICANT CHANGE UP (ref 6–8)
RBC # BLD: 3.25 M/UL — LOW (ref 4.2–5.4)
RBC # FLD: 14.6 % — HIGH (ref 11.5–14.5)
SODIUM SERPL-SCNC: 140 MMOL/L — SIGNIFICANT CHANGE UP (ref 135–146)
SPECIMEN SOURCE: SIGNIFICANT CHANGE UP
SPECIMEN SOURCE: SIGNIFICANT CHANGE UP
WBC # BLD: 3.43 K/UL — LOW (ref 4.8–10.8)
WBC # FLD AUTO: 3.43 K/UL — LOW (ref 4.8–10.8)

## 2023-04-16 PROCEDURE — 71045 X-RAY EXAM CHEST 1 VIEW: CPT | Mod: 26

## 2023-04-16 PROCEDURE — 76705 ECHO EXAM OF ABDOMEN: CPT | Mod: 26

## 2023-04-16 RX ADMIN — LOSARTAN POTASSIUM 100 MILLIGRAM(S): 100 TABLET, FILM COATED ORAL at 05:08

## 2023-04-16 RX ADMIN — Medication 650 MILLIGRAM(S): at 00:25

## 2023-04-16 RX ADMIN — Medication 1 GRAM(S): at 17:08

## 2023-04-16 RX ADMIN — CHLORHEXIDINE GLUCONATE 1 APPLICATION(S): 213 SOLUTION TOPICAL at 05:09

## 2023-04-16 RX ADMIN — Medication 650 MILLIGRAM(S): at 06:35

## 2023-04-16 RX ADMIN — PIPERACILLIN AND TAZOBACTAM 25 GRAM(S): 4; .5 INJECTION, POWDER, LYOPHILIZED, FOR SOLUTION INTRAVENOUS at 05:09

## 2023-04-16 RX ADMIN — Medication 650 MILLIGRAM(S): at 17:08

## 2023-04-16 RX ADMIN — PANTOPRAZOLE SODIUM 40 MILLIGRAM(S): 20 TABLET, DELAYED RELEASE ORAL at 05:08

## 2023-04-16 RX ADMIN — Medication 1 GRAM(S): at 00:27

## 2023-04-16 RX ADMIN — ENOXAPARIN SODIUM 40 MILLIGRAM(S): 100 INJECTION SUBCUTANEOUS at 06:18

## 2023-04-16 RX ADMIN — PIPERACILLIN AND TAZOBACTAM 25 GRAM(S): 4; .5 INJECTION, POWDER, LYOPHILIZED, FOR SOLUTION INTRAVENOUS at 21:33

## 2023-04-16 RX ADMIN — Medication 1 GRAM(S): at 05:08

## 2023-04-16 RX ADMIN — PANTOPRAZOLE SODIUM 40 MILLIGRAM(S): 20 TABLET, DELAYED RELEASE ORAL at 17:08

## 2023-04-16 RX ADMIN — Medication 650 MILLIGRAM(S): at 05:10

## 2023-04-16 RX ADMIN — Medication 650 MILLIGRAM(S): at 17:34

## 2023-04-16 RX ADMIN — PIPERACILLIN AND TAZOBACTAM 25 GRAM(S): 4; .5 INJECTION, POWDER, LYOPHILIZED, FOR SOLUTION INTRAVENOUS at 13:08

## 2023-04-16 RX ADMIN — Medication 1 GRAM(S): at 13:09

## 2023-04-16 NOTE — DISCHARGE NOTE NURSING/CASE MANAGEMENT/SOCIAL WORK - PATIENT PORTAL LINK FT
You can access the FollowMyHealth Patient Portal offered by [facility] by registering at the following website: [email or web address]. By joining [facility]’s FollowMyHealth portal, you will also be able to view your health information using other applications (apps) compatible with our system.

## 2023-04-16 NOTE — PROGRESS NOTE ADULT - ASSESSMENT
63yFemale with hx of Pancreatic head IPMN, s/p pancreaticoduodenectomy, presenting with Postoperative pain, and melanotic stools. She is s/p EGD 4/10 revealing a marginal ulcer at the duodenojejunal anastomosis, and multiple small clean based gastric ulcers. S/p2 blood transfusions,  Diet advanced. pt downgraded to floor    PLAN:  - Tolerating FLD  - F/u CBC   - F/u RUQ US  - F/U GI for repeat EGD next week  - Encourage incentive spirometry use and ambulation   - Zosyn to end 4/16, switch to PO levaquin and flagyl

## 2023-04-16 NOTE — DISCHARGE NOTE NURSING/CASE MANAGEMENT/SOCIAL WORK - NSDCPEFALRISK_GEN_ALL_CORE
For information on Fall & Injury Prevention, visit: [email or web address] OR  [email or web address] OR  https://www.cdc.gov/steadi/patient.html

## 2023-04-16 NOTE — PROGRESS NOTE ADULT - SUBJECTIVE AND OBJECTIVE BOX
GENERAL SURGERY PROGRESS NOTE    Patient: JEREMIAS IVEY , 63y ([birth year])Female   MRN: [id number]  Location: [address] (Back) 025 A  Visit: 04-09-23 Inpatient  Date: 04-16-23 @ 06:36    Events of past 24 hours: Patient downgraded to the floor. Tolerating FLD, denies N/V. Has mild epigastric pain still with drinking. Passing flatus but denies bowel movements. Hg stable. JANICE 10 cc's serosanguinous.      PAST MEDICAL & SURGICAL HISTORY:  HTN (hypertension)      High cholesterol      Diabetes      COPD (chronic obstructive pulmonary disease)      Kidney stones      No significant past surgical history          Vitals:   T(F): 98 (04-16-23 @ 04:59), Max: 98.5 (04-15-23 @ 17:54)  HR: 62 (04-16-23 @ 04:59)  BP: 135/75 (04-16-23 @ 04:59)  RR: 18 (04-16-23 @ 04:59)  SpO2: 94% (04-16-23 @ 04:59)      Diet, Full Liquid:   Consistent Carbohydrate No Snacks  DASH/TLC Sodium & Cholesterol Restricted  Prosource Gelatein 20 Sugar Free     Qty per Day:  3  Supplement Feeding Modality:  Oral  Ensure Enlive Cans or Servings Per Day:  1       Frequency:  Daily      Fluids:     I & O's:    04-14-23 @ 07:01  -  04-15-23 @ 07:00  --------------------------------------------------------  IN:    IV PiggyBack: 350 mL    Lactated Ringers: 1100 mL    Oral Fluid: 1120 mL    PRBCs (Packed Red Blood Cells): 343 mL  Total IN: 2913 mL    OUT:    Bulb (mL): 30 mL    Voided (mL): 1525 mL  Total OUT: 1555 mL    Total NET: 1358 mL    PHYSICAL EXAM:  General Appearance: AAOX3, NAD  Heart: RRR  Lungs: CTAX2  Abdomen:  soft, non tender, post surgical wound w clean wound dressing in place, JANICE drain w SS output  MSK/Extremities:  mobile    MEDICATIONS  (STANDING):  acetaminophen     Tablet .. 650 milliGRAM(s) Oral every 6 hours  chlorhexidine 2% Cloths 1 Application(s) Topical <User Schedule>  enoxaparin Injectable 40 milliGRAM(s) SubCutaneous every 24 hours  insulin lispro (ADMELOG) corrective regimen sliding scale   SubCutaneous Before meals and at bedtime  losartan 100 milliGRAM(s) Oral daily  magnesium sulfate  IVPB 2 Gram(s) IV Intermittent once  pantoprazole  Injectable 40 milliGRAM(s) IV Push every 12 hours  piperacillin/tazobactam IVPB.. 3.375 Gram(s) IV Intermittent every 8 hours  potassium chloride  20 mEq/100 mL IVPB 20 milliEquivalent(s) IV Intermittent every 2 hours  sucralfate suspension 1 Gram(s) Oral every 6 hours    MEDICATIONS  (PRN):  HYDROmorphone  Injectable 0.5 milliGRAM(s) IV Push every 6 hours PRN Severe Pain (7 - 10)      DVT PROPHYLAXIS: enoxaparin Injectable 40 milliGRAM(s) SubCutaneous every 24 hours    GI PROPHYLAXIS: pantoprazole  Injectable 40 milliGRAM(s) IV Push every 12 hours    ANTICOAGULATION:   ANTIBIOTICS:  piperacillin/tazobactam IVPB.. 3.375 Gram(s)            LAB/STUDIES:  Labs:  CAPILLARY BLOOD GLUCOSE      POCT Blood Glucose.: 136 mg/dL (15 Apr 2023 21:34)  POCT Blood Glucose.: 130 mg/dL (15 Apr 2023 16:28)  POCT Blood Glucose.: 147 mg/dL (15 Apr 2023 11:23)                          9.0    3.83  )-----------( 463      ( 15 Apr 2023 22:42 )             28.0       Auto Neutrophil %: 62.1 % (04-15-23 @ 12:43)  Auto Immature Granulocyte %: 1.1 % (04-15-23 @ 12:43)    04-15    142  |  103  |  5<L>  ----------------------------<  133<H>  4.5   |  28  |  0.5<L>      Calcium, Total Serum: 8.6 mg/dL (04-15-23 @ 22:42)      LFTs:             5.8  | 0.8  | 35       ------------------[642     ( 15 Apr 2023 22:42 )  3.2  | 0.2  | 74          Lipase:109    Amylase:x             Coags:    IMAGING: F/U RUQ US

## 2023-04-17 ENCOUNTER — TRANSCRIPTION ENCOUNTER (OUTPATIENT)
Age: 63
End: 2023-04-17

## 2023-04-17 VITALS
SYSTOLIC BLOOD PRESSURE: 127 MMHG | HEART RATE: 76 BPM | DIASTOLIC BLOOD PRESSURE: 81 MMHG | OXYGEN SATURATION: 96 % | TEMPERATURE: 96 F | RESPIRATION RATE: 18 BRPM

## 2023-04-17 DIAGNOSIS — Z20.822 CONTACT WITH AND (SUSPECTED) EXPOSURE TO COVID-19: ICD-10-CM

## 2023-04-17 DIAGNOSIS — E11.9 TYPE 2 DIABETES MELLITUS WITHOUT COMPLICATIONS: ICD-10-CM

## 2023-04-17 DIAGNOSIS — E78.00 PURE HYPERCHOLESTEROLEMIA, UNSPECIFIED: ICD-10-CM

## 2023-04-17 DIAGNOSIS — I10 ESSENTIAL (PRIMARY) HYPERTENSION: ICD-10-CM

## 2023-04-17 DIAGNOSIS — J44.9 CHRONIC OBSTRUCTIVE PULMONARY DISEASE, UNSPECIFIED: ICD-10-CM

## 2023-04-17 DIAGNOSIS — E83.42 HYPOMAGNESEMIA: ICD-10-CM

## 2023-04-17 DIAGNOSIS — D13.6 BENIGN NEOPLASM OF PANCREAS: ICD-10-CM

## 2023-04-17 DIAGNOSIS — Z87.442 PERSONAL HISTORY OF URINARY CALCULI: ICD-10-CM

## 2023-04-17 DIAGNOSIS — E87.20 ACIDOSIS, UNSPECIFIED: ICD-10-CM

## 2023-04-17 LAB
GLUCOSE BLDC GLUCOMTR-MCNC: 146 MG/DL — HIGH (ref 70–99)
GLUCOSE BLDC GLUCOMTR-MCNC: 149 MG/DL — HIGH (ref 70–99)

## 2023-04-17 PROCEDURE — 99232 SBSQ HOSP IP/OBS MODERATE 35: CPT

## 2023-04-17 RX ORDER — PANTOPRAZOLE SODIUM 20 MG/1
40 TABLET, DELAYED RELEASE ORAL
Qty: 0 | Refills: 0 | DISCHARGE
Start: 2023-04-17

## 2023-04-17 RX ORDER — SUCRALFATE 1 G
10 TABLET ORAL
Qty: 0 | Refills: 0 | DISCHARGE
Start: 2023-04-17

## 2023-04-17 RX ORDER — PANTOPRAZOLE SODIUM 20 MG/1
1 TABLET, DELAYED RELEASE ORAL
Qty: 60 | Refills: 0
Start: 2023-04-17 | End: 2023-05-16

## 2023-04-17 RX ORDER — SUCRALFATE 1 G
1 TABLET ORAL
Qty: 120 | Refills: 0
Start: 2023-04-17 | End: 2023-05-16

## 2023-04-17 RX ORDER — SUCRALFATE 1 G
1 TABLET ORAL
Qty: 90 | Refills: 0
Start: 2023-04-17 | End: 2023-05-16

## 2023-04-17 RX ADMIN — Medication 650 MILLIGRAM(S): at 01:30

## 2023-04-17 RX ADMIN — Medication 650 MILLIGRAM(S): at 00:37

## 2023-04-17 RX ADMIN — PANTOPRAZOLE SODIUM 40 MILLIGRAM(S): 20 TABLET, DELAYED RELEASE ORAL at 05:20

## 2023-04-17 RX ADMIN — LOSARTAN POTASSIUM 100 MILLIGRAM(S): 100 TABLET, FILM COATED ORAL at 05:21

## 2023-04-17 RX ADMIN — Medication 650 MILLIGRAM(S): at 05:20

## 2023-04-17 RX ADMIN — Medication 1 GRAM(S): at 00:38

## 2023-04-17 RX ADMIN — CHLORHEXIDINE GLUCONATE 1 APPLICATION(S): 213 SOLUTION TOPICAL at 05:23

## 2023-04-17 RX ADMIN — ENOXAPARIN SODIUM 40 MILLIGRAM(S): 100 INJECTION SUBCUTANEOUS at 05:21

## 2023-04-17 RX ADMIN — Medication 1 GRAM(S): at 05:21

## 2023-04-17 RX ADMIN — Medication 650 MILLIGRAM(S): at 05:38

## 2023-04-17 RX ADMIN — PIPERACILLIN AND TAZOBACTAM 25 GRAM(S): 4; .5 INJECTION, POWDER, LYOPHILIZED, FOR SOLUTION INTRAVENOUS at 05:20

## 2023-04-17 NOTE — PROGRESS NOTE ADULT - SUBJECTIVE AND OBJECTIVE BOX
SURGERY PROGRESS NOTE     Patient: JEREMIAS IVEY , 63y ([birth year])Female   MRN: [id number]  Location: [address] (Back) 025 A  Visit: 04-09-23 Inpatient  Date: 04-17-23 @ 00:44       Events of past 24 hours:  Patient seen resting comfortably in bed. No acute events overnight. Hemodynamically stable. Patient having BM and passing gas - patient states most recent stools have been melanotic. Patient's dressings changed and she was advanced to a low residue diet.     PAST MEDICAL & SURGICAL HISTORY:  HTN (hypertension)      High cholesterol      Diabetes      COPD (chronic obstructive pulmonary disease)      Kidney stones      No significant past surgical history           Vitals:   T(F): 98.1 (04-17-23 @ 00:13), Max: 98.2 (04-16-23 @ 20:42)  HR: 72 (04-17-23 @ 00:13)  BP: 123/82 (04-17-23 @ 00:13)  RR: 18 (04-17-23 @ 00:13)  SpO2: 95% (04-17-23 @ 00:13)      Diet, Low Fiber      Fluids:     I & O's:    04-15-23 @ 07:01  -  04-16-23 @ 07:00  --------------------------------------------------------  IN:  Total IN: 0 mL    OUT:    Bulb (mL): 12.5 mL  Total OUT: 12.5 mL    Total NET: -12.5 mL           PHYSICAL EXAM:   General: NAD, AAOx3, calm and cooperative  Cardiac: RRR S1, S2  Respiratory: CTAB, normal respiratory effort  Abdomen: Soft, non-distended, non-tender, no rebound, no guarding. +BS.  Incision/wound: healing well, dressings in place, clean, dry and intact    MEDICATIONS  (STANDING):  acetaminophen     Tablet .. 650 milliGRAM(s) Oral every 6 hours  chlorhexidine 2% Cloths 1 Application(s) Topical <User Schedule>  enoxaparin Injectable 40 milliGRAM(s) SubCutaneous every 24 hours  insulin lispro (ADMELOG) corrective regimen sliding scale   SubCutaneous Before meals and at bedtime  losartan 100 milliGRAM(s) Oral daily  magnesium sulfate  IVPB 2 Gram(s) IV Intermittent once  pantoprazole  Injectable 40 milliGRAM(s) IV Push every 12 hours  piperacillin/tazobactam IVPB.. 3.375 Gram(s) IV Intermittent every 8 hours  potassium chloride  20 mEq/100 mL IVPB 20 milliEquivalent(s) IV Intermittent every 2 hours  sucralfate suspension 1 Gram(s) Oral every 6 hours    MEDICATIONS  (PRN):  HYDROmorphone  Injectable 0.5 milliGRAM(s) IV Push every 6 hours PRN Severe Pain (7 - 10)      DVT PROPHYLAXIS: enoxaparin Injectable 40 milliGRAM(s) SubCutaneous every 24 hours    GI PROPHYLAXIS: pantoprazole  Injectable 40 milliGRAM(s) IV Push every 12 hours    ANTICOAGULATION:   ANTIBIOTICS:  piperacillin/tazobactam IVPB.. 3.375 Gram(s)            LAB/STUDIES:  Labs:  CAPILLARY BLOOD GLUCOSE      POCT Blood Glucose.: 127 mg/dL (16 Apr 2023 20:56)  POCT Blood Glucose.: 143 mg/dL (16 Apr 2023 16:50)  POCT Blood Glucose.: 141 mg/dL (16 Apr 2023 11:27)  POCT Blood Glucose.: 134 mg/dL (16 Apr 2023 07:58)                          9.6    3.43  )-----------( 455      ( 16 Apr 2023 21:44 )             29.1       Auto Neutrophil %: 54.8 % (04-16-23 @ 21:44)  Auto Immature Granulocyte %: 1.2 % (04-16-23 @ 21:44)    04-16    140  |  102  |  7<L>  ----------------------------<  131<H>  4.0   |  26  |  0.6<L>      Calcium, Total Serum: 8.6 mg/dL (04-16-23 @ 21:44)      LFTs:             6.1  | 0.7  | 37       ------------------[618     ( 16 Apr 2023 21:44 )  3.4  | 0.2  | 66          Lipase:x      Amylase:x             Coags:    IMAGING:   < from: US Abdomen Upper Quadrant Right (04.16.23 @ 09:36) >  IMPRESSION:  No intrahepatic biliary dilation. Post biliary stent placement.  Post cholecystectomy.  Post Whipple surgery. Pancreatic stent noted.      < end of copied text >

## 2023-04-17 NOTE — PROGRESS NOTE ADULT - PROVIDER SPECIALTY LIST ADULT
Gastroenterology
SICU
Surgery
Gastroenterology
Infectious Disease
Infectious Disease
SICU
Surgery
Infectious Disease
SICU
Surgery

## 2023-04-17 NOTE — DISCHARGE NOTE PROVIDER - NSDCMRMEDTOKEN_GEN_ALL_CORE_FT
acetaminophen 325 mg oral tablet: 2 tab(s) orally every 6 hours as needed for  pain  Benicar: orally once a day  Carafate 1 g oral tablet: 1 tab(s) orally 3 times a day Take 1 Pill Before Each Meal MDD: 3 Tabs  Lipitor: orally once a day  metFORMIN:   Protonix 40 mg oral delayed release tablet: 1 tab(s) orally every 12 hours MDD: 2 tabs  sucralfate 1 g/10 mL oral suspension: 10 milliliter(s) orally every 6 hours

## 2023-04-17 NOTE — DISCHARGE NOTE PROVIDER - DETAILS OF MALNUTRITION DIAGNOSIS/DIAGNOSES
This patient has been assessed with a concern for Malnutrition and was treated during this hospitalization for the following Nutrition diagnosis/diagnoses:     -  04/12/2023: Moderate protein-calorie malnutrition

## 2023-04-17 NOTE — DISCHARGE NOTE PROVIDER - NSDCDCMDCOMP_GEN_ALL_CORE
This document is complete and the patient is ready for discharge. Include Location In Plan?: No Detail Level: Detailed

## 2023-04-17 NOTE — PROGRESS NOTE ADULT - ASSESSMENT
63yFemale with hx of Pancreatic head IPMN, s/p pancreaticoduodenectomy, presenting with Postoperative pain, and melanotic stools. She is s/p EGD 4/10 revealing a marginal ulcer at the duodenojejunal anastomosis, and multiple small clean based gastric ulcers. S/p2 blood transfusions,  Diet advanced. pt downgraded to floor    PLAN:  - Tolerating low res diet, +G +BM, no nausea/vomiting  - F/u CBC   - F/U GI for repeat EGD next week  - Encourage incentive spirometry use and ambulation   - Zosyn to end 4/16, switch to PO levaquin and flagyl    63yFemale with hx of Pancreatic head IPMN, s/p pancreaticoduodenectomy, presenting with Postoperative pain, and melanotic stools. She is s/p EGD 4/10 revealing a marginal ulcer at the duodenojejunal anastomosis, and multiple small clean based gastric ulcers. S/p2 blood transfusions,  Diet advanced. pt downgraded to floor    PLAN:  - Tolerating low res diet, +G +BM, no nausea/vomiting  - F/u CBC   - F/U GI for repeat EGD next week  - Encourage incentive spirometry use and ambulation   - DC ABX  -  #[id number]    spectra [phone]

## 2023-04-17 NOTE — DISCHARGE NOTE PROVIDER - HOSPITAL COURSE
63y F  POD #7 s/p Pancreaticoduodenectomy on 4/3/2023 and d/c on 4/8, presented for lightheadedness and recent episodes of melanotic stools (last 4/9 in afternoon). Denies fevers/chills, chest pain, shortness of breath, syncope. She states that last night she had an episode of nausea and retching without vomiting and increasing abdominal pain.  In ED, Hgb 8.5>7.5>7.2, LFTs elevated Tbili 3.3>3.4>2.4, Alk phos 1146, now 1108. CT was unremarkable, GI c/s, EGD  done to evaluate the GJ anastomosis no active bleeding noted, old blood noted, negative for obstruction. Pt was tachycardic on the floor 110s, tmax 100.8, bolused 500cc LR, started IVF, zosyn.     Patient upgraded to the SICU for hemodynamic monitoring. SICU COURSE, patient received 2U PRBC. First unit was on 4/11 for Hgb 6.7, second unit 4/14 for Hgb 6.9; hemoglobin responded to both transfusions. had 2 episodes of melena; last episode 4/15 at 10AM.  Plan per Dr. Blanco is to obtain repeat EGD with GI next week, earlier if additional episodes of melena. Patient's LFTs, lipase, and amylase were trended; now downtrending. Patient has been tolerating clear diet. JANICE drain output has been serosanginous. Patient has been on Zosyn for intra-abdominal infection ppx; per ID can dc on 4/16 and transition to PO levaquin and flagyl. Patient has been mentating well, adequately voiding, non-tachycardic, with stable BP, and has been ambulating daily. Patient downgraded to the floor after Hgb stabilized.     GI: IV PPI BID, Active type and screen, monitor for signs of GI bleeding, monitor Hb daily and transfuse to keep > 7, will plan for repeat EGD early next week or earlier on emergency bases     ID: Zosyn  3.375 Gram(s) IV Intermittent every 8 hours, likely empiric 7 days if BCX NG, on D/C PO levaquin 500mg daily + flagyl PO 500mg TID end 4/16    Patient has been tolerating diet well with good bowel function, ambulating and pain is well controlled. JANICE drain removed today. No further intervention indicated, patient to follow up with Dr Blanco Friday and GI.

## 2023-04-17 NOTE — DISCHARGE NOTE PROVIDER - CARE PROVIDER_API CALL
Karyn Blanco (HEATHER)  Surgery  [address] [place] [address], [address] [place] [address]  Phone: [phone]  Fax: [phone]  Scheduled Appointment: 04/21/2023    Edmund Charlton)  Gastroenterology; Internal Medicine  [address] [place] [address]  Phone: [phone]  Fax: [phone]  Follow Up Time:

## 2023-04-17 NOTE — PROGRESS NOTE ADULT - ASSESSMENT
Patient is a 62 y/o Female with PMHx of DM, HTN, HLD, COPD whom is s/p Whipple for ( IPMN- low grade) whom is followed by GI for Melena post procedure. She has been downgraded and overall well appearing. No abdominal complaints today.       Melena  Recent Pylorus-preserving Whipple Pancreaticoduodenectomy   - Patient is hemodynamically stable  - labs reviewed and Hgb reassuring  - CT scan reviewed  - prior EGD/EUS and pathology reports reviewed   - s/p EGD 4/10 revealing a marginal ulcer at the duodenojejunal anastomosis, and multiple small clean based gastric ulcers (please refer to the report in sunrise for full details)   - PO PPI BID    - Active type and screen     Elevated liver enzymes: improving   - likely due to edema at the hepaticojejunostomy vs ? occlusion from a clot in the setting of bleed at the hepaticojejunostomy  - labs reviewed   - acute hepatitis panel is negative     Make NPO after midnight  Hold AC tomorrow for EGD Tuesday 4/18

## 2023-04-17 NOTE — CHART NOTE - NSCHARTNOTEFT_GEN_A_CORE
Registered Dietitian Follow-Up     Patient Profile Reviewed                           Yes [x]   No []     Nutrition History Previously Obtained        Yes [x]  No []       Pertinent Subjective Information:  Patient's daughter at bedside providing translation.     Patient was able to eat rice krispies cereal this morning. Patient's daughter also brought congee but thinner version - not as solid. Patient with mild abdominal pain sometimes.     Nutrition Education: RD discussed low fiber diet and oral nutrition supplements post discharge and benefits of oral nutrition supplements if with decreased PO intake.      Pertinent Medical Interventions:  Patient is with hx of pancreatic head IPMN, s/p pancreaticoduodenectomy, presenting with postoperative pain and melanotic stools. She is s/p EGD 4/10 revealing a marginal ulcer at the duodenojejunal anastomosis, and multiple small clean based gastric ulcers.     Diet order:   Diet, Low Fiber (04-16-23 @ 09:00) [Active]    Anthropometrics:  Height (cm): 160 (04-15-23 @ 17:54)  Weight (kg): 68.9 (04-15-23 @ 17:54)  BMI (kg/m2): 26.9 (04-15-23 @ 17:54)  IBW: 52.2 kg     MEDICATIONS  (STANDING):  acetaminophen     Tablet .. 650 milliGRAM(s) Oral every 6 hours  chlorhexidine 2% Cloths 1 Application(s) Topical <User Schedule>  enoxaparin Injectable 40 milliGRAM(s) SubCutaneous every 24 hours  insulin lispro (ADMELOG) corrective regimen sliding scale   SubCutaneous Before meals and at bedtime  losartan 100 milliGRAM(s) Oral daily  magnesium sulfate  IVPB 2 Gram(s) IV Intermittent once  pantoprazole  Injectable 40 milliGRAM(s) IV Push every 12 hours  potassium chloride  20 mEq/100 mL IVPB 20 milliEquivalent(s) IV Intermittent every 2 hours  sucralfate suspension 1 Gram(s) Oral every 6 hours    MEDICATIONS  (PRN):    Pertinent Labs: 04-16 @ 21:44: Na 140, BUN 7<L>, Cr 0.6<L>, <H>, K+ 4.0, Phos 3.3, Mg 1.9, Alk Phos 618<H>, ALT/SGPT 66<H>, AST/SGOT 37, HbA1c --    Finger Sticks:  POCT Blood Glucose.: 146 mg/dL (04-17 @ 11:31)  POCT Blood Glucose.: 149 mg/dL (04-17 @ 07:23)  POCT Blood Glucose.: 127 mg/dL (04-16 @ 20:56)  POCT Blood Glucose.: 143 mg/dL (04-16 @ 16:50)    Physical Findings:  - Appearance: AAO x 4; no edema indicated   - GI function: last BM 4/14  - Tubes: n/a - no feeding tubes  - Oral/Mouth cavity: low fiber - regular texture/thin liquids  - Skin: surgical incision to abdomen noted; no Pressure Injury indicated      Nutrition Requirements:  Weight Used: 68 kg - dry weight per initial RD assessment      Estimated Energy Needs    Continue [x]  Adjust []  1456 - 1820 kcal/day (MSJ x 1.2 - 1.5 SF) - PCM       Estimated Protein Needs    Continue [x]  Adjust []  81 - 102 gm/day (1.2 - 1.15 gm/kg)      Estimated Fluid Needs        Continue [x]  Adjust []  2040 mL/day (30 mL/kg)      Nutrient Intake: Fair PO intake 50-75% of meals      [x] Previous Nutrition Diagnosis:  Moderate Malnutrition             [x] Ongoing          [] Resolved     Nutrition Intervention:  meals and snacks, medical food supplements, coordination of care     Goal/Expected Outcome:   PO intake >75% of meals within 5-7 days      Indicator/Monitoring:   energy intake, weight, labs, skin status, NFPE      Recommendation:  1) Continue current diet order  2) Recommend Glucerna Shakes 2x/day (180 kcal, 10 gm Protein each) to optimize kcal and protein intake - please add to diet order if not being discharged  3) Weekly weight  4) Encouragement and assistance with all meals, snacks, supplement    Patient is at moderate nutrition risk, RD to f/u in 5-7 days or PRN if not discharged    RD to remain available: Jennifer Kahn RD [phone] or via Teams

## 2023-04-17 NOTE — PROGRESS NOTE ADULT - SUBJECTIVE AND OBJECTIVE BOX
Patient is a 64 y/o Female with PMHx of DM, HTN, HLD, COPD whom is s/p Whipple for ( IPMN- low grade) whom is followed by GI for Melena post procedure. She has been downgraded and overall well appearing. No abdominal complaints today.       PAST MEDICAL & SURGICAL HISTORY:  HTN (hypertension)  High cholesterol  Diabetes  COPD (chronic obstructive pulmonary disease)  Kidney stones        MEDICATIONS  (STANDING):  acetaminophen     Tablet .. 650 milliGRAM(s) Oral every 6 hours  chlorhexidine 2% Cloths 1 Application(s) Topical <User Schedule>  enoxaparin Injectable 40 milliGRAM(s) SubCutaneous every 24 hours  insulin lispro (ADMELOG) corrective regimen sliding scale   SubCutaneous Before meals and at bedtime  iron sucrose IVPB 100 milliGRAM(s) IV Intermittent once  lactated ringers. 1000 milliLiter(s) (50 mL/Hr) IV Continuous <Continuous>  losartan 100 milliGRAM(s) Oral daily  pantoprazole  Injectable 40 milliGRAM(s) IV Push every 12 hours  piperacillin/tazobactam IVPB.. 3.375 Gram(s) IV Intermittent every 8 hours  sucralfate suspension 1 Gram(s) Oral every 6 hours    MEDICATIONS  (PRN):  HYDROmorphone  Injectable 0.5 milliGRAM(s) IV Push every 6 hours PRN Severe Pain (7 - 10)    Allergies  No Known Allergies    Review of Systems  General:  Denies Fatigue, Denies Fever, Denies Weakness ,Denies Weight Loss   HEENT: Denies Trouble Swallowing ,Denies  Sore Throat , Denies Change in hearing/vision/speech ,Denies Dizziness    Cardio: Denies  Chest Pain , Palpitations    Respiratory: Denies worsening of SOB, Denies Cough  Abdomen: See detailed HPI  Neuro: Denies Headache Denies Dizziness, Denies Paresthesias  MSK: Denies pain in Bones/Joints/Muscles   Psych: Patient denies depression, denies suicidal or homicidal ideations  Integ: Patient Denies rash, or new skin lesions       Vital Signs Last 24 Hrs  T(C): 36.4 (17 Apr 2023 08:07), Max: 36.8 (16 Apr 2023 20:42)  T(F): 97.5 (17 Apr 2023 08:07), Max: 98.2 (16 Apr 2023 20:42)  HR: 73 (17 Apr 2023 08:07) (66 - 78)  BP: 118/69 (17 Apr 2023 08:07) (118/69 - 147/80)  BP(mean): --  RR: 18 (17 Apr 2023 08:07) (18 - 18)  SpO2: 96% (17 Apr 2023 08:07) (93% - 96%)    Parameters below as of 17 Apr 2023 08:07  Patient On (Oxygen Delivery Method): room air      GENERAL: AAOx3, no acute distress.  HEAD:  Atraumatic, Normocephalic  EYES: conjunctiva and sclera clear  NECK: Supple, no JVD or thyromegaly  CHEST/LUNG: Clear to auscultation bilaterally; No wheeze, rhonchi, or rales  HEART: Regular rate and rhythm; normal S1, S2, No murmurs.  ABDOMEN: Soft, nondistended; Bowel sounds present, clean wound site, drain in place  NEUROLOGY: No asterixis or tremor.   SKIN: Intact, no jaundice     Labs:                        9.6    3.43  )-----------( 455      ( 16 Apr 2023 21:44 )             29.1     04-16    140  |  102  |  7<L>  ----------------------------<  131<H>  4.0   |  26  |  0.6<L>    Ca    8.6      16 Apr 2023 21:44  Phos  3.3     04-16  Mg     1.9     04-16    TPro  6.1  /  Alb  3.4<L>  /  TBili  0.7  /  DBili  0.2  /  AST  37  /  ALT  66<H>  /  AlkPhos  618<H>  04-16

## 2023-04-20 DIAGNOSIS — K25.9 GASTRIC ULCER, UNSPECIFIED AS ACUTE OR CHRONIC, WITHOUT HEMORRHAGE OR PERFORATION: ICD-10-CM

## 2023-04-20 DIAGNOSIS — R10.9 UNSPECIFIED ABDOMINAL PAIN: ICD-10-CM

## 2023-04-20 DIAGNOSIS — D13.6 BENIGN NEOPLASM OF PANCREAS: ICD-10-CM

## 2023-04-20 DIAGNOSIS — Z79.84 LONG TERM (CURRENT) USE OF ORAL HYPOGLYCEMIC DRUGS: ICD-10-CM

## 2023-04-20 DIAGNOSIS — D64.9 ANEMIA, UNSPECIFIED: ICD-10-CM

## 2023-04-20 DIAGNOSIS — K91.89 OTHER POSTPROCEDURAL COMPLICATIONS AND DISORDERS OF DIGESTIVE SYSTEM: ICD-10-CM

## 2023-04-20 DIAGNOSIS — D72.829 ELEVATED WHITE BLOOD CELL COUNT, UNSPECIFIED: ICD-10-CM

## 2023-04-20 DIAGNOSIS — E44.0 MODERATE PROTEIN-CALORIE MALNUTRITION: ICD-10-CM

## 2023-04-20 DIAGNOSIS — R55 SYNCOPE AND COLLAPSE: ICD-10-CM

## 2023-04-20 DIAGNOSIS — E83.42 HYPOMAGNESEMIA: ICD-10-CM

## 2023-04-20 DIAGNOSIS — J44.9 CHRONIC OBSTRUCTIVE PULMONARY DISEASE, UNSPECIFIED: ICD-10-CM

## 2023-04-20 DIAGNOSIS — K85.90 ACUTE PANCREATITIS WITHOUT NECROSIS OR INFECTION, UNSPECIFIED: ICD-10-CM

## 2023-04-20 DIAGNOSIS — E78.5 HYPERLIPIDEMIA, UNSPECIFIED: ICD-10-CM

## 2023-04-20 DIAGNOSIS — K92.1 MELENA: ICD-10-CM

## 2023-04-20 DIAGNOSIS — E11.9 TYPE 2 DIABETES MELLITUS WITHOUT COMPLICATIONS: ICD-10-CM

## 2023-04-20 DIAGNOSIS — K83.1 OBSTRUCTION OF BILE DUCT: ICD-10-CM

## 2023-04-20 DIAGNOSIS — Z41.8 ENCOUNTER FOR OTHER PROCEDURES FOR PURPOSES OTHER THAN REMEDYING HEALTH STATE: ICD-10-CM

## 2023-04-20 DIAGNOSIS — I10 ESSENTIAL (PRIMARY) HYPERTENSION: ICD-10-CM

## 2023-04-20 DIAGNOSIS — K91.840 POSTPROCEDURAL HEMORRHAGE OF A DIGESTIVE SYSTEM ORGAN OR STRUCTURE FOLLOWING A DIGESTIVE SYSTEM PROCEDURE: ICD-10-CM

## 2023-04-20 DIAGNOSIS — G89.18 OTHER ACUTE POSTPROCEDURAL PAIN: ICD-10-CM

## 2023-04-20 DIAGNOSIS — R50.82 POSTPROCEDURAL FEVER: ICD-10-CM

## 2023-04-21 ENCOUNTER — APPOINTMENT (OUTPATIENT)
Dept: SURGERY | Facility: CLINIC | Age: 63
End: 2023-04-21
Payer: MEDICAID

## 2023-04-21 VITALS
SYSTOLIC BLOOD PRESSURE: 110 MMHG | HEART RATE: 118 BPM | TEMPERATURE: 97 F | HEIGHT: 63 IN | WEIGHT: 154 LBS | BODY MASS INDEX: 27.29 KG/M2 | OXYGEN SATURATION: 95 % | DIASTOLIC BLOOD PRESSURE: 76 MMHG

## 2023-04-21 PROCEDURE — 99024 POSTOP FOLLOW-UP VISIT: CPT

## 2023-04-21 RX ORDER — SUCRALFATE 1 G/10ML
1 SUSPENSION ORAL 3 TIMES DAILY
Qty: 450 | Refills: 2 | Status: ACTIVE | COMMUNITY
Start: 2023-04-21 | End: 1900-01-01

## 2023-04-21 NOTE — ASSESSMENT
[FreeTextEntry1] : Ms. JEREMIAS IVEY is a 62 year  year old woman. She presented to the office for the first time on 02/03/2023 , her primary is Doesnt have PCP .\par \par She had some pain, was worked up outpt in [place] and then had and EUS guided biopsy of the pancreas for a small mass in the the genu or the pancreas.\par the PD is dilated proximally \par We do not have have any imaging to review. Biopsy had atypical cells. \par 2/27/23 ; pmd phelix estes [id number]\par CT shows a smal complex mass in the head / uncinate as well as the neck \par sma smv looks good. \par 4/21: s/p Pancreaticoduodenectom \par path ipmn\par did well ifrst 4 days \par got readmitted with 24 hrs - blood per rectum and obstructive jaundice\par likely affrente syndrome with bleeding\par eating well\par having bowel movements - yellow\par after large meals having pain \par \par impression: Main Duct IPMN - causing issues. \par \par gradually improving PancreaticoDuodenectomy. \par \par We explained in great detail the pathophysiology of the disease process. We reyna diagrams and discussed the workup for diagnosis and management.\par The various options were explained to the patient. The Risk , benefit and alternatives were discussed. We discussed recovery and possible complications.\par The Post operative care was explained to the patient. She was counselled on diet , exercise and wound care.\par We discussed the pathology and surgery with her.\par \par We discussed for over 60 min . \par We discussed the importance of close follow up. \par We informed that she needs to follow up  in 1 week\par We also informed that she can call us if anything changes or has any questions.\par

## 2023-04-21 NOTE — HISTORY OF PRESENT ILLNESS
[de-identified] : Ms. JEREMIAS IVEY is a 62 year  year old woman. She presented to the office for the first time on 02/03/2023 , her primary is Doesnt have PCP .\par \par She had some pain, was worked up outpt in [place] and then had and EUS guided biopsy of the pancreas for a small mass in the the genu or the pancreas.\par the PD is dilated proximally \par We do not have have any imaging to review. Biopsy had atypical cells. \par 2/27/23 ; pmd phelix [facility] [phone]\par CT shows a smal complex mass in the head / uncinate as well as the neck \par sma smv looks good. \par 4/21: s/p Pancreaticoduodenectom \par path ipmn\par did well ifrst 4 days \par got readmitted with 24 hrs - blood per rectum and obstructive jaundice\par likely affrente syndrome with bleeding\par eating well\par having bowel movements - yellow\par after large meals having pain \par

## 2023-04-21 NOTE — PHYSICAL EXAM
[JVD] : no jugular venous distention  [Purpura] : no purpura  [Alert] : alert [Calm] : calm [de-identified] : Normal  [de-identified] : Normal  , no 8ictureus [de-identified] : Normal \par [id number] scar

## 2023-04-26 ENCOUNTER — TRANSCRIPTION ENCOUNTER (OUTPATIENT)
Age: 63
End: 2023-04-26

## 2023-04-26 ENCOUNTER — OUTPATIENT (OUTPATIENT)
Dept: INPATIENT UNIT | Facility: HOSPITAL | Age: 63
LOS: 1 days | Discharge: HOME | End: 2023-04-26
Payer: MEDICAID

## 2023-04-26 VITALS
SYSTOLIC BLOOD PRESSURE: 124 MMHG | RESPIRATION RATE: 18 BRPM | HEART RATE: 81 BPM | OXYGEN SATURATION: 94 % | DIASTOLIC BLOOD PRESSURE: 70 MMHG

## 2023-04-26 VITALS — SYSTOLIC BLOOD PRESSURE: 109 MMHG | TEMPERATURE: 97 F | DIASTOLIC BLOOD PRESSURE: 72 MMHG | HEART RATE: 87 BPM

## 2023-04-26 DIAGNOSIS — Z90.49 ACQUIRED ABSENCE OF OTHER SPECIFIED PARTS OF DIGESTIVE TRACT: Chronic | ICD-10-CM

## 2023-04-26 DIAGNOSIS — R14.0 ABDOMINAL DISTENSION (GASEOUS): ICD-10-CM

## 2023-04-26 DIAGNOSIS — K86.89 OTHER SPECIFIED DISEASES OF PANCREAS: ICD-10-CM

## 2023-04-26 PROCEDURE — 44360 SMALL BOWEL ENDOSCOPY: CPT

## 2023-04-26 NOTE — ASU PATIENT PROFILE, ADULT - FALL HARM RISK - UNIVERSAL INTERVENTIONS
Bed in lowest position, wheels locked, appropriate side rails in place/Call bell, personal items and telephone in reach/Instruct patient to call for assistance before getting out of bed or chair/Non-slip footwear when patient is out of bed/[place] to call system/Physically safe environment - no spills, clutter or unnecessary equipment/Purposeful Proactive Rounding/Room/bathroom lighting operational, light cord in reach

## 2023-04-26 NOTE — ASU DISCHARGE PLAN (ADULT/PEDIATRIC) - NS MD DC FALL RISK RISK
For information on Fall & Injury Prevention, visit: [email or web address] OR  [email or web address] OR  https://www.cdc.gov/steadi/patient.html

## 2023-04-26 NOTE — ASU DISCHARGE PLAN (ADULT/PEDIATRIC) - CARE PROVIDER_API CALL
Edmund Charlton)  Gastroenterology; Internal Medicine  [address] [place] [address]  Phone: [phone]  Fax: [phone]  Follow Up Time:

## 2023-04-28 ENCOUNTER — APPOINTMENT (OUTPATIENT)
Dept: SURGERY | Facility: CLINIC | Age: 63
End: 2023-04-28
Payer: MEDICAID

## 2023-04-28 VITALS
WEIGHT: 130.07 LBS | DIASTOLIC BLOOD PRESSURE: 74 MMHG | OXYGEN SATURATION: 96 % | SYSTOLIC BLOOD PRESSURE: 116 MMHG | HEART RATE: 120 BPM | TEMPERATURE: 97 F | BODY MASS INDEX: 23.05 KG/M2 | HEIGHT: 63 IN

## 2023-04-28 DIAGNOSIS — R10.9 UNSPECIFIED ABDOMINAL PAIN: ICD-10-CM

## 2023-04-28 DIAGNOSIS — K28.9 GASTROJEJUNAL ULCER, UNSPECIFIED AS ACUTE OR CHRONIC, WITHOUT HEMORRHAGE OR PERFORATION: ICD-10-CM

## 2023-04-28 DIAGNOSIS — Z90.49 ACQUIRED ABSENCE OF OTHER SPECIFIED PARTS OF DIGESTIVE TRACT: ICD-10-CM

## 2023-04-28 DIAGNOSIS — K44.9 DIAPHRAGMATIC HERNIA WITHOUT OBSTRUCTION OR GANGRENE: ICD-10-CM

## 2023-04-28 DIAGNOSIS — Z90.411 ACQUIRED PARTIAL ABSENCE OF PANCREAS: ICD-10-CM

## 2023-04-28 DIAGNOSIS — R63.4 ABNORMAL WEIGHT LOSS: ICD-10-CM

## 2023-04-28 LAB
ALBUMIN SERPL ELPH-MCNC: 4.1 G/DL
ALP BLD-CCNC: 1258 U/L
ALT SERPL-CCNC: 221 U/L
AMYLASE/CREAT SERPL: 39 U/L
ANION GAP SERPL CALC-SCNC: 18 MMOL/L
AST SERPL-CCNC: 172 U/L
BASOPHILS # BLD AUTO: 0.02 K/UL
BASOPHILS NFR BLD AUTO: 0.3 %
BILIRUB DIRECT SERPL-MCNC: 1.9 MG/DL
BILIRUB INDIRECT SERPL-MCNC: 0.7 MG/DL
BILIRUB SERPL-MCNC: 2.6 MG/DL
BUN SERPL-MCNC: 19 MG/DL
CALCIUM SERPL-MCNC: 10.2 MG/DL
CHLORIDE SERPL-SCNC: 99 MMOL/L
CO2 SERPL-SCNC: 20 MMOL/L
CREAT SERPL-MCNC: 1 MG/DL
EGFR: 64 ML/MIN/1.73M2
EOSINOPHIL # BLD AUTO: 0.05 K/UL
EOSINOPHIL NFR BLD AUTO: 0.7 %
GLUCOSE SERPL-MCNC: 207 MG/DL
HCT VFR BLD CALC: 37.9 %
HGB BLD-MCNC: 12.7 G/DL
IMM GRANULOCYTES NFR BLD AUTO: 0.4 %
LPL SERPL-CCNC: 119 U/L
LYMPHOCYTES # BLD AUTO: 0.45 K/UL
LYMPHOCYTES NFR BLD AUTO: 6.3 %
MAN DIFF?: NORMAL
MCHC RBC-ENTMCNC: 30 PG
MCHC RBC-ENTMCNC: 33.5 G/DL
MCV RBC AUTO: 89.4 FL
MONOCYTES # BLD AUTO: 0.19 K/UL
MONOCYTES NFR BLD AUTO: 2.7 %
NEUTROPHILS # BLD AUTO: 6.4 K/UL
NEUTROPHILS NFR BLD AUTO: 89.6 %
PLATELET # BLD AUTO: 306 K/UL
POTASSIUM SERPL-SCNC: 4 MMOL/L
PROT SERPL-MCNC: 7.5 G/DL
RBC # BLD: 4.24 M/UL
RBC # FLD: 13.9 %
SODIUM SERPL-SCNC: 137 MMOL/L
WBC # FLD AUTO: 7.14 K/UL

## 2023-04-28 PROCEDURE — 99024 POSTOP FOLLOW-UP VISIT: CPT

## 2023-04-28 NOTE — ASSESSMENT
[FreeTextEntry1] : Ms. JEREMIAS IVEY is a 62 year  year old woman. She presented to the office for the first time on 02/03/2023 , her primary is Doesnt have PCP .\par \par She had some pain, was worked up outpt in [place] and then had and EUS guided biopsy of the pancreas for a small mass in the the genu or the pancreas.\par the PD is dilated proximally \par We do not have have any imaging to review. Biopsy had atypical cells. \par 2/27/23 ; pmd phelix [facility] [id number]\par CT shows a smal complex mass in the head / uncinate as well as the neck \par sma smv looks good. \par 4/21: s/p Pancreaticoduodenectom \par path ipmn\par did well ifrst 4 days \par got readmitted with 24 hrs - blood per rectum and obstructive jaundice\par likely affrente syndrome with bleeding\par eating well\par having bowel movements - yellow\par after large meals having pain \par 4/28/23:  she had repeat egd - ulcers are healing \par she had some old food- suggesting gastroparesis\par \par \par \par impression: Main Duct IPMN - causing issues. \par \par Gradually improving PancreaticoDuodenectomy. \par \par \par We explained in great detail the pathophysiology of the disease process. We reyna diagrams and discussed the workup for diagnosis and management.\par The various options were explained to the patient. The Risk , benefit and alternatives were discussed. We discussed recovery and possible complications.\par The Post operative care was explained to the patient. She was counselled on diet , exercise and wound care.\par We discussed the pathology and surgery with her.\par \par We discussed for over 60 min . \par We discussed the importance of close follow up. \par We informed that she needs to follow up  in 2-3 week\par We also informed that she can call us if anything changes or has any questions.\par

## 2023-04-28 NOTE — HISTORY OF PRESENT ILLNESS
[de-identified] : Ms. JEREMIAS IVEY is a 62 year  year old woman. She presented to the office for the first time on 02/03/2023 , her primary is Doesnt have PCP .\par \par She had some pain, was worked up outpt in [place] and then had and EUS guided biopsy of the pancreas for a small mass in the the genu or the pancreas.\par the PD is dilated proximally \par We do not have have any imaging to review. Biopsy had atypical cells. \par 2/27/23 ; pmd phelix [facility] [id number]\par CT shows a smal complex mass in the head / uncinate as well as the neck \par sma smv looks good. \par 4/21: s/p Pancreaticoduodenectom \par path ipmn\par did well ifrst 4 days \par got readmitted with 24 hrs - blood per rectum and obstructive jaundice\par likely affrente syndrome with bleeding\par eating well\par having bowel movements - yellow\par after large meals having pain \par 4/28/23:  she had repeat egd - ulcers are healing \par she had some old food- suggesting gastroparesis\par \par

## 2023-04-28 NOTE — PHYSICAL EXAM
[Alert] : alert [Calm] : calm [JVD] : no jugular venous distention  [Purpura] : no purpura  [de-identified] : Normal  [de-identified] : Normal  , no 8ictureus [de-identified] : Normal \par [id number] scar

## 2023-05-02 LAB
ALBUMIN SERPL ELPH-MCNC: 4.1 G/DL
ALP BLD-CCNC: 839 U/L
ALT SERPL-CCNC: 68 U/L
ANION GAP SERPL CALC-SCNC: 18 MMOL/L
AST SERPL-CCNC: 26 U/L
BILIRUB SERPL-MCNC: 0.8 MG/DL
BUN SERPL-MCNC: 17 MG/DL
CALCIUM SERPL-MCNC: 9.8 MG/DL
CHLORIDE SERPL-SCNC: 96 MMOL/L
CO2 SERPL-SCNC: 20 MMOL/L
CREAT SERPL-MCNC: 0.8 MG/DL
EGFR: 83 ML/MIN/1.73M2
GLUCOSE SERPL-MCNC: 164 MG/DL
POTASSIUM SERPL-SCNC: 3.9 MMOL/L
PROT SERPL-MCNC: 7.4 G/DL
SODIUM SERPL-SCNC: 134 MMOL/L

## 2023-05-11 ENCOUNTER — OUTPATIENT (OUTPATIENT)
Dept: OUTPATIENT SERVICES | Facility: HOSPITAL | Age: 63
LOS: 1 days | End: 2023-05-11
Payer: MEDICAID

## 2023-05-11 ENCOUNTER — RESULT REVIEW (OUTPATIENT)
Age: 63
End: 2023-05-11

## 2023-05-11 DIAGNOSIS — R10.9 UNSPECIFIED ABDOMINAL PAIN: ICD-10-CM

## 2023-05-11 DIAGNOSIS — Z00.8 ENCOUNTER FOR OTHER GENERAL EXAMINATION: ICD-10-CM

## 2023-05-11 DIAGNOSIS — Z90.49 ACQUIRED ABSENCE OF OTHER SPECIFIED PARTS OF DIGESTIVE TRACT: Chronic | ICD-10-CM

## 2023-05-11 PROCEDURE — 74177 CT ABD & PELVIS W/CONTRAST: CPT

## 2023-05-11 PROCEDURE — 74177 CT ABD & PELVIS W/CONTRAST: CPT | Mod: 26

## 2023-05-12 DIAGNOSIS — R10.9 UNSPECIFIED ABDOMINAL PAIN: ICD-10-CM

## 2023-05-17 ENCOUNTER — APPOINTMENT (OUTPATIENT)
Dept: SURGERY | Facility: CLINIC | Age: 63
End: 2023-05-17
Payer: MEDICAID

## 2023-05-17 VITALS
OXYGEN SATURATION: 97 % | HEIGHT: 63 IN | HEART RATE: 94 BPM | BODY MASS INDEX: 23.21 KG/M2 | DIASTOLIC BLOOD PRESSURE: 86 MMHG | TEMPERATURE: 96.8 F | SYSTOLIC BLOOD PRESSURE: 122 MMHG | WEIGHT: 131 LBS

## 2023-05-17 LAB
ALBUMIN SERPL ELPH-MCNC: 4.4 G/DL
ALP BLD-CCNC: 340 U/L
ALT SERPL-CCNC: 18 U/L
AMYLASE/CREAT SERPL: 41 U/L
ANION GAP SERPL CALC-SCNC: 14 MMOL/L
AST SERPL-CCNC: 19 U/L
BASOPHILS # BLD AUTO: 0.02 K/UL
BASOPHILS NFR BLD AUTO: 0.4 %
BILIRUB DIRECT SERPL-MCNC: <0.2 MG/DL
BILIRUB INDIRECT SERPL-MCNC: >0.2 MG/DL
BILIRUB SERPL-MCNC: 0.4 MG/DL
BUN SERPL-MCNC: 8 MG/DL
CALCIUM SERPL-MCNC: 10 MG/DL
CHLORIDE SERPL-SCNC: 101 MMOL/L
CO2 SERPL-SCNC: 26 MMOL/L
CREAT SERPL-MCNC: 0.7 MG/DL
EGFR: 98 ML/MIN/1.73M2
EOSINOPHIL # BLD AUTO: 0.11 K/UL
EOSINOPHIL NFR BLD AUTO: 2.3 %
GLUCOSE SERPL-MCNC: 127 MG/DL
HCT VFR BLD CALC: 30.2 %
HGB BLD-MCNC: 9.6 G/DL
IMM GRANULOCYTES NFR BLD AUTO: 0.2 %
LPL SERPL-CCNC: 32 U/L
LYMPHOCYTES # BLD AUTO: 1.33 K/UL
LYMPHOCYTES NFR BLD AUTO: 27.8 %
MAN DIFF?: NORMAL
MCHC RBC-ENTMCNC: 28.5 PG
MCHC RBC-ENTMCNC: 31.8 G/DL
MCV RBC AUTO: 89.6 FL
MONOCYTES # BLD AUTO: 0.32 K/UL
MONOCYTES NFR BLD AUTO: 6.7 %
NEUTROPHILS # BLD AUTO: 3 K/UL
NEUTROPHILS NFR BLD AUTO: 62.6 %
PLATELET # BLD AUTO: 302 K/UL
POTASSIUM SERPL-SCNC: 4.6 MMOL/L
PROT SERPL-MCNC: 7.6 G/DL
RBC # BLD: 3.37 M/UL
RBC # FLD: 14.1 %
SODIUM SERPL-SCNC: 141 MMOL/L
WBC # FLD AUTO: 4.79 K/UL

## 2023-05-17 PROCEDURE — 99024 POSTOP FOLLOW-UP VISIT: CPT

## 2023-05-17 NOTE — HISTORY OF PRESENT ILLNESS
[de-identified] : Ms. JEREMIAS IVEY is a 62 year  year old woman. She presented to the office for the first time on 02/03/2023 , her primary is Doesnt have PCP .\par \par She had some pain, was worked up outpt in [place] and then had and EUS guided biopsy of the pancreas for a small mass in the the genu or the pancreas.\par the PD is dilated proximally \par We do not have have any imaging to review. Biopsy had atypical cells. \par 2/27/23 ; pmd phelix estes [id number]\par CT shows a smal complex mass in the head / uncinate as well as the neck \par sma smv looks good. \par 4/21: s/p Pancreaticoduodenectom \par path ipmn\par did well ifrst 4 days \par got readmitted with 24 hrs - blood per rectum and obstructive jaundice\par likely affrente syndrome with bleeding\par eating well\par having bowel movements - yellow\par after large meals having pain \par 4/28/23:  she had repeat egd - ulcers are healing \par she had some old food- suggesting gastroparesis\par \par 5/17: repeat lft was higher - likley after egd \par now lft improved\par she is eating bettter\par tolerating more food \par yellow well formed stools\par weight stable. \par Ct shows a liver abcess and a peripancreatic collection. \par HJ stent is still in place

## 2023-05-17 NOTE — PHYSICAL EXAM
[JVD] : no jugular venous distention  [Purpura] : no purpura  [Alert] : alert [Calm] : calm [de-identified] : Normal  [de-identified] : Normal  , no 8ictureus [de-identified] : Normal \par [id number] scar

## 2023-05-17 NOTE — ASSESSMENT
[FreeTextEntry1] : Ms. JEREMIAS IVEY is a 62 year  year old woman. She presented to the office for the first time on 02/03/2023 , her primary is Doesnt have PCP .\par \par She had some pain, was worked up outpt in [place] and then had and EUS guided biopsy of the pancreas for a small mass in the the genu or the pancreas.\par the PD is dilated proximally \par We do not have have any imaging to review. Biopsy had atypical cells. \par 2/27/23 ; pmd phelix [facility] [id number]\par CT shows a smal complex mass in the head / uncinate as well as the neck \par sma smv looks good. \par 4/21: s/p Pancreaticoduodenectom \par path ipmn\par did well ifrst 4 days \par got readmitted with 24 hrs - blood per rectum and obstructive jaundice\par likely affrente syndrome with bleeding\par eating well\par having bowel movements - yellow\par after large meals having pain \par 4/28/23:  she had repeat egd - ulcers are healing \par she had some old food- suggesting gastroparesis\par \par 5/17: repeat lft was higher - likley after egd \par now lft improved\par she is eating bettter\par tolerating more food \par yellow well formed stools\par weight stable. \par Ct shows a liver abcess and a peripancreatic collection. \par HJ stent is still in place\par \par impression: Main Duct IPMN - causing issues. \par \par Gradually improving PancreaticoDuodenectomy. \par doing well \par will get labs\par will observe the  fluids for now. \par \par \par \par We explained in great detail the pathophysiology of the disease process. We reyna diagrams and discussed the workup for diagnosis and management.\par The various options were explained to the patient. The Risk , benefit and alternatives were discussed. We discussed recovery and possible complications.\par The Post operative care was explained to the patient. She was counselled on diet , exercise and wound care.\par We discussed the pathology and surgery with her.\par \par We discussed for over 60 min . \par We discussed the importance of close follow up. \par We informed that she needs to follow up  in 1  week\par We also informed that she can call us if anything changes or has any questions.\par

## 2023-05-18 ENCOUNTER — APPOINTMENT (OUTPATIENT)
Dept: GASTROENTEROLOGY | Facility: CLINIC | Age: 63
End: 2023-05-18
Payer: MEDICAID

## 2023-05-18 VITALS
SYSTOLIC BLOOD PRESSURE: 126 MMHG | OXYGEN SATURATION: 98 % | HEART RATE: 88 BPM | WEIGHT: 130 LBS | DIASTOLIC BLOOD PRESSURE: 78 MMHG | HEIGHT: 63 IN | BODY MASS INDEX: 23.04 KG/M2

## 2023-05-18 DIAGNOSIS — R10.9 UNSPECIFIED ABDOMINAL PAIN: ICD-10-CM

## 2023-05-18 PROCEDURE — 99215 OFFICE O/P EST HI 40 MIN: CPT

## 2023-05-20 PROBLEM — R10.9 ABDOMINAL PAIN: Status: ACTIVE | Noted: 2023-04-26

## 2023-05-20 RX ORDER — AMOXICILLIN AND CLAVULANATE POTASSIUM 875; 125 MG/1; MG/1
875-125 TABLET, COATED ORAL
Qty: 56 | Refills: 0 | Status: ACTIVE | COMMUNITY
Start: 2023-05-20 | End: 1900-01-01

## 2023-05-20 RX ORDER — PANTOPRAZOLE 40 MG/1
40 TABLET, DELAYED RELEASE ORAL TWICE DAILY
Qty: 2 | Refills: 0 | Status: ACTIVE | COMMUNITY
Start: 2023-05-20 | End: 1900-01-01

## 2023-05-20 NOTE — PHYSICAL EXAM
[Sclera] : the sclera and conjunctiva were normal [Hearing Threshold Finger Rub Not [place]] : hearing was normal [Normal Lips/Gums] : the lips and gums were normal [Normal Appearance] : the appearance of the neck was normal [Normal] : heart rate was normal and rhythm regular, normal S1 and S2, no murmurs [None] : no edema [Bowel Sounds] : normal bowel sounds [Abdomen Soft] : soft [No CVA Tenderness] : no CVA  tenderness [Abnormal Walk] : normal gait [Normal Color / Pigmentation] : normal skin color and pigmentation [Normal Turgor] : normal skin turgor [No Focal Deficits] : no focal deficits [Oriented To Time, Place, And Person] : oriented to person, place, and time [de-identified] : healing transverse mid abdominal scar, mild tenderness at RUQ and epigastric areas

## 2023-05-20 NOTE — ASSESSMENT
[FreeTextEntry1] : 63 y.o F w/ hx HTN, HLD, DM, and hx of MD-IPMN s/p pylorus-preserving Pancreaticoduodenectomy (Whipple) on 4/3/2023 (6 mm solid nodule, low-grade intestinal type w/ no HGD or malignancy), UGIB 2/2 marginal ulcer, who is here for a f/u visit.\par \par 5/11/23 CT shows a liver collection (? abscess) and a peripancreatic collection. HJ stent is still in place. \par \par #MD-IPMN s/p pylorus-preserving Pancreaticoduodenectomy\par #UGIB 2/2 marginal ulcers\par #abnormal CT w/ liver collection suspicious for abscess and peripancreatic collection\par #CT w/ HJ stent is still in place\par #abd pain - post-op + ? gastroparesis -> improving\par \par overall improving clinically \par discussed case w/ surgery \par \par -continue w/ small frequent meals and advance diet consistency as tolerated\par -continue pantoprazole 40 mg q12h\par -since pt doing clinically well and has no leukocytosis, empiric augmentin 875/125 x 4 weeks for liver abscess\par -rpt endoscopy to remove HJ stent in 1-2 mo\par -f/u with surgery next week as scheduled\par -since pt doing overall well, will hold off on rpt CT soon and will consider rpt in 1-2 mo\par -if pt develops new symptoms, will repeat CT earlier and pending progression of abscess, may require IR drainage\par -f/u in 1 mo

## 2023-05-20 NOTE — HISTORY OF PRESENT ILLNESS
[FreeTextEntry1] : LAST VISIT - 1/26/23\par \par [place]  ID: [id number]\par \par 63 y.o F w/ hx HTN, HLD, DM, and hx of MD-IPMN s/p pylorus-preserving Pancreaticoduodenectomy (Whipple) on 4/3/2023 (6 mm solid nodule, low-grade intestinal type w/ no HGD or malignancy), UGIB 2/2 marginal ulcer, who is here for a f/u visit.\par \par She had her Whipple on 4/3/23 and had an uncomplicated course after and was discharged on 4/8/23. \par She was readmitted from 4/9/23 to 4/17/23 after her surgery for pre-syncope and reported episodes of black stools, and was evaluated during her stay for UGIB. she also had jaundice and transaminitis.\par She had EGD/enteroscopy on 4/10/23 showing a 3 cm marginal ulcer as well as multiple small <1 cm antral ulcers.\par \par Her other complaint was food intolerance and epigastric and RUQ pain mainly after meals and at nighttime.\par she had decreased PO intake and was only able to tolerate liquids for a while. she lost 20 lbs in the post-op phase. She was treated conservatively and discharged after her Hb was stable and symptoms improved. \par she had transaminitis which improved. \par \par she had repeat enteroscopy on 4/26/23 which showed healing of marginal ulcer and we removed a dislodged stent from the afferent limb. \par \par Over the past few 2-3 weeks she has been doing progressively better. she is able to tolerate meals better w/ less pain. she is eating in small frequent portions. \par she still c/o some pain which is overall improved since prior. \par she denies n/v/d and has no other GI symptoms. \par she is following w/ surgery Dr. Blanco closely. \par \par ROS otherwise negative.\par \par \par EGD w/ enteroscopy on 4/10/23: anatomy of Pylorus-preserving Whipple surgery.\par -Multiple small (<1 cm) clean based ulcers in the antrum.\par -A marginal, circumferential anastomotic ulcer measuring around 3 cm at the duodeno-jejunotomy with surrounding edema, stigmata of recent bleeding, and visible surgical sutures. There was no evidence of active bleeding. The efferent limb was examined and had no obstruction but had multiple small (< 1 cm) clean based ulcers.\par \par Enteroscopy 4/26/23: \par -Evidence of a previous pylorus-preserving Whipple surgery with evidence of a duodeno-jejunotomy. The previously seen ulcers in the stomach have healed.\par -Food in the fundus suggestive of gastroparesis\par -hiatal hernia\par -A marginal, circumferential anastomotic ulcer measuring around 2-3 cm was seen at the duodeno-jejunotomy with mild surrounding edema, and visible surgical sutures seen. The ulcer had partially healed as compared to the prior procedure. There was no evidence of active bleeding. The efferent limb was examined and had no obstruction. The PCF colonoscope was advanced into the afferent limb revealing a dislodged stent that was removed using a rat tooth forceps. The hepaticojejunal and pancreaticojejunal anastomosis could not be seen due to excessive looping.\par \par \par CT 4/9/23 w/ IV contrast: No evidence of acute abdominal pathology.\par Post pancreaticoduodenectomy with expected recent postoperative change.\par \par CT 5/11/23 w/ IV contrast: \par 1. Since April 9, 2023, new heterogeneous 4.0 cm lesion along the right hepatic dome, with central area of hypoattenuation, suspicious for abscess; an additional new subcentimeter hypodensity along the right hepatic dome is too small to further characterize.\par 2. New 4.2 cm thick-walled fluid collection just anterior to the pancreatic body and inseparable from the adjacent gastric antrum and transverse colon; additional scattered areas of ascites are noted, predominantly within the right upper quadrant\par 3. Status post prior pancreaticoduodenectomy with choledochojejunostomy stent in place; interval removal of previously noted right upper quadrant JANICE drain and pancreaticojejunostomy stent.\par \par \par PMHx: HTN, DM, DL\par PSHx: none\par Fam hx: no fam hx of GI malignancy\par Social hx: neg x 3

## 2023-05-24 ENCOUNTER — APPOINTMENT (OUTPATIENT)
Dept: SURGERY | Facility: CLINIC | Age: 63
End: 2023-05-24
Payer: MEDICAID

## 2023-05-24 PROCEDURE — 99024 POSTOP FOLLOW-UP VISIT: CPT

## 2023-05-24 NOTE — HISTORY OF PRESENT ILLNESS
[de-identified] : Ms. JEREMIAS IVEY is a 62 year  year old woman. She presented to the office for the first time on 02/03/2023 , her primary is Doesnt have PCP .\par \par She had some pain, was worked up outpt in [place] and then had and EUS guided biopsy of the pancreas for a small mass in the the genu or the pancreas.\par the PD is dilated proximally \par We do not have have any imaging to review. Biopsy had atypical cells. \par 2/27/23 ; pmd phelix estes [id number]\par CT shows a smal complex mass in the head / uncinate as well as the neck \par sma smv looks good. \par 4/21: s/p Pancreaticoduodenectom \par path ipmn\par did well ifrst 4 days \par got readmitted with 24 hrs - blood per rectum and obstructive jaundice\par likely affrente syndrome with bleeding\par eating well\par having bowel movements - yellow\par after large meals having pain \par 4/28/23:  she had repeat egd - ulcers are healing \par she had some old food- suggesting gastroparesis\par \par 5/17: repeat lft was higher - likley after egd \par now lft improved\par she is eating bettter\par tolerating more food \par yellow well formed stools\par weight stable. \par Ct shows a liver abcess and a peripancreatic collection. \par HJ stent is still in place\par 5/24 : she is doing well\par repeat labs still shows improvement\par GI Started on abx

## 2023-05-24 NOTE — PHYSICAL EXAM
[Alert] : alert [Calm] : calm [JVD] : no jugular venous distention  [Purpura] : no purpura  [de-identified] : Normal  [de-identified] : Normal  , no 8ictureus [de-identified] : Normal \par [id number] scar

## 2023-05-24 NOTE — ASSESSMENT
[FreeTextEntry1] : Ms. JEREMIAS IVEY is a 62 year  year old woman. She presented to the office for the first time on 02/03/2023 , her primary is Doesnt have PCP .\par \par She had some pain, was worked up outpt in [place] and then had and EUS guided biopsy of the pancreas for a small mass in the the genu or the pancreas.\par the PD is dilated proximally \par We do not have have any imaging to review. Biopsy had atypical cells. \par 2/27/23 ; pmd phelix [facility] [id number]\par CT shows a smal complex mass in the head / uncinate as well as the neck \par sma smv looks good. \par 4/21: s/p Pancreaticoduodenectom \par path ipmn\par did well ifrst 4 days \par got readmitted with 24 hrs - blood per rectum and obstructive jaundice\par likely affrente syndrome with bleeding\par eating well\par having bowel movements - yellow\par after large meals having pain \par 4/28/23:  she had repeat egd - ulcers are healing \par she had some old food- suggesting gastroparesis\par \par 5/17: repeat lft was higher - likley after egd \par now lft improved\par she is eating bettter\par tolerating more food \par yellow well formed stools\par weight stable. \par Ct shows a liver abcess and a peripancreatic collection. \par HJ stent is still in place\par 5/24 : she is doing well\par repeat labs still shows improvement\par GI Started on abx\par \par \par \par impression: Main Duct IPMN - causing issues. \par \par Gradually improving PancreaticoDuodenectomy. \par doing well \par will follow her \par will observe the  fluids for now. \par \par \par \par We explained in great detail the pathophysiology of the disease process. We reyna diagrams and discussed the workup for diagnosis and management.\par The various options were explained to the patient. The Risk , benefit and alternatives were discussed. We discussed recovery and possible complications.\par The Post operative care was explained to the patient. She was counselled on diet , exercise and wound care.\par We discussed the pathology and surgery with her.\par \par We discussed for over 60 min . \par We discussed the importance of close follow up. \par We informed that she needs to follow up  in 1  mon th\par We also informed that she can call us if anything changes or has any questions.\par

## 2023-06-21 ENCOUNTER — APPOINTMENT (OUTPATIENT)
Dept: SURGERY | Facility: CLINIC | Age: 63
End: 2023-06-21
Payer: MEDICAID

## 2023-06-21 VITALS
SYSTOLIC BLOOD PRESSURE: 116 MMHG | WEIGHT: 131 LBS | DIASTOLIC BLOOD PRESSURE: 72 MMHG | TEMPERATURE: 96.8 F | HEIGHT: 63 IN | BODY MASS INDEX: 23.21 KG/M2 | OXYGEN SATURATION: 98 % | HEART RATE: 82 BPM

## 2023-06-21 PROCEDURE — 99024 POSTOP FOLLOW-UP VISIT: CPT

## 2023-06-21 NOTE — HISTORY OF PRESENT ILLNESS
[de-identified] : Ms. JEREMIAS IVEY is a 62 year  year old woman. She presented to the office for the first time on 02/03/2023 , her primary is Doesnt have PCP .\par \par She had some pain, was worked up outpt in [place] and then had and EUS guided biopsy of the pancreas for a small mass in the the genu or the pancreas.\par the PD is dilated proximally \par We do not have have any imaging to review. Biopsy had atypical cells. \par 2/27/23 ; pmd phelix [facility]es [id number]\par CT shows a smal complex mass in the head / uncinate as well as the neck \par sma smv looks good. \par 4/21: s/p Pancreaticoduodenectom \par path ipmn\par did well ifrst 4 days \par got readmitted with 24 hrs - blood per rectum and obstructive jaundice\par likely affrente syndrome with bleeding\par eating well\par having bowel movements - yellow\par after large meals having pain \par 4/28/23:  she had repeat egd - ulcers are healing \par she had some old food- suggesting gastroparesis\par \par 5/17: repeat lft was higher - likley after egd \par now lft improved\par she is eating bettter\par tolerating more food \par yellow well formed stools\par weight stable. \par Ct shows a liver abcess and a peripancreatic collection. \par HJ stent is still in place\par 5/24 : she is doing well\par repeat labs still shows improvement\par GI Started on abx\par 6/21 : continues to improve\par minimal discomfort after eating hot or cold food\par normal well formed brown stools\par no abx for a month

## 2023-06-21 NOTE — ASSESSMENT
[FreeTextEntry1] : Ms. JEREMIAS IVEY is a 62 year  year old woman. She presented to the office for the first time on 02/03/2023 , her primary is Doesnt have PCP .\par \par She had some pain, was worked up outpt in [place] and then had and EUS guided biopsy of the pancreas for a small mass in the the genu or the pancreas.\par the PD is dilated proximally \par We do not have have any imaging to review. Biopsy had atypical cells. \par 2/27/23 ; pmd phelix [facility] [id number]\par CT shows a smal complex mass in the head / uncinate as well as the neck \par sma smv looks good. \par 4/21: s/p Pancreaticoduodenectom \par path ipmn\par did well ifrst 4 days \par got readmitted with 24 hrs - blood per rectum and obstructive jaundice\par likely affrente syndrome with bleeding\par eating well\par having bowel movements - yellow\par after large meals having pain \par 4/28/23:  she had repeat egd - ulcers are healing \par she had some old food- suggesting gastroparesis\par \par 5/17: repeat lft was higher - likley after egd \par now lft improved\par she is eating bettter\par tolerating more food \par yellow well formed stools\par weight stable. \par Ct shows a liver abcess and a peripancreatic collection. \par HJ stent is still in place\par 5/24 : she is doing well\par repeat labs still shows improvement\par GI Started on abx\par 6/21 : continues to improve\par minimal discomfort after eating hot or cold food\par normal well formed brown stools\par no abx for a month\par \par \par \par \par impression: Main Duct IPMN - causing issues. \par \par healing well PancreaticoDuodenectomy. \par doing well \par \par \par \par We explained in great detail the pathophysiology of the disease process. We reyna diagrams and discussed the workup for diagnosis and management.\par The various options were explained to the patient. The Risk , benefit and alternatives were discussed. We discussed recovery and possible complications.\par The Post operative care was explained to the patient. She was counselled on diet , exercise and wound care.\par We discussed the pathology and surgery with her.\par \par We discussed for over 60 min . \par We discussed the importance of close follow up. \par We informed that she needs to follow up  in 1 year\par We also informed that she can call us if anything changes or has any questions.\par

## 2023-06-21 NOTE — PHYSICAL EXAM
[JVD] : no jugular venous distention  [Purpura] : no purpura  [Alert] : alert [Calm] : calm [de-identified] : Normal  [de-identified] : Normal  , no 8ictureus [de-identified] : Normal \par [id number] scar

## 2023-06-22 ENCOUNTER — APPOINTMENT (OUTPATIENT)
Dept: GASTROENTEROLOGY | Facility: CLINIC | Age: 63
End: 2023-06-22

## 2023-06-22 NOTE — ASU PATIENT PROFILE, ADULT - VISION (WITH CORRECTIVE LENSES IF THE PATIENT USUALLY WEARS THEM):
Detail Level: Zone
Continue Regimen: Ketoconazole for maintenance \\nClotrimazole-betamethasone for flare ups
Normal vision: sees adequately in most situations; can see medication labels, newsprint

## 2023-09-14 ENCOUNTER — APPOINTMENT (OUTPATIENT)
Dept: GASTROENTEROLOGY | Facility: CLINIC | Age: 63
End: 2023-09-14
Payer: MEDICAID

## 2023-09-14 VITALS
SYSTOLIC BLOOD PRESSURE: 120 MMHG | HEIGHT: 63 IN | WEIGHT: 130 LBS | DIASTOLIC BLOOD PRESSURE: 78 MMHG | BODY MASS INDEX: 23.04 KG/M2 | OXYGEN SATURATION: 98 % | HEART RATE: 78 BPM

## 2023-09-14 DIAGNOSIS — R14.0 ABDOMINAL DISTENSION (GASEOUS): ICD-10-CM

## 2023-09-14 DIAGNOSIS — K28.9 GASTROJEJUNAL ULCER, UNSPECIFIED AS ACUTE OR CHRONIC, W/OUT HEMORRHAGE OR PERFORATION: ICD-10-CM

## 2023-09-14 PROCEDURE — 99214 OFFICE O/P EST MOD 30 MIN: CPT

## 2023-09-14 RX ORDER — PANTOPRAZOLE 40 MG/1
40 TABLET, DELAYED RELEASE ORAL
Qty: 90 | Refills: 1 | Status: ACTIVE | COMMUNITY
Start: 2023-09-14 | End: 1900-01-01

## 2023-11-20 NOTE — PROGRESS NOTE ADULT - SUBJECTIVE AND OBJECTIVE BOX
Endocrinology Clinic Visit       Video-Visit Details    Type of service:  Video Visit    Joined the call at 11/20/2023, 9:37:14 am.  Left the call at 11/20/2023, 9:53:37 am.    Originating Location (pt. Location): Home        Distant Location (provider location):  Off-site    Mode of Communication:  Video Conference via [employer]    Physician has received verbal consent for a Video Visit from the patient? Yes    I spent a total of 40 minutes on the date of encounter reviewing medical records, evaluating the patient, coordinating care and documenting in the EHR, as detailed above.      NAME:  Hanh Villalba  PCP:  Mari Nolanbercata Ramirez  MRN:  [id number]  Reason for Consult:  Primary hyperparathyroidism  Requesting Provider:  Angeli Nolan    Chief Complaint     No chief complaint on file.      History of Present Illness     Hanh Villalba is a 70 year old female who is seen in video visit for primary hyperparathyroidism. Last seen 5/2023.      She has a PMH of chronic hypothyroidism on lt4 and uterine cancer s/p TAHBSO 3 years ago.     She noted high calcium back in 2020, work up started in December. She never felt any different it was just noted on routine BMP. Based on chart reviewed ca was 10.4 on 9/2019. Previously normal. Highest caclium 11.1 one time on 12/2023, otherwise range 10.3-10.9.    Symptoms of hypercalcemia: no constipation, no dyspepsia, no mental status changes/lethargy, no polyuria.    Calcium intake: Dietary: limited diary intake, Supplements: stopped since 2020    Vitamin D intake: Supplements: 2000 international unit(s) daily    Previous fractures: No  Family history of fragility fracture in parent: No  History of kidney stones: No  History of kidney disease: Yes: CKD stage 3a. I reviewed her old record from . She had GFR of > 60 in 2005, since 2008 her GFR ranging between mid 40s and mid 50s.  Family history of any calcium problems or kidney stones: No  History of  JEREMIAS IVEY     63y Female    MRN-[id number]    Admit Date: 04-09-23  Hospital LOS: 1d  ICU Admission Date: 4/10/23  OR #1-        ============================  HPI   63y F  POD #7 s/p Pancreaticoduodenectomy on 4/3/2023 and d/c on 4/8, presented for lightheadedness and recent episodes of melanotic stools (last 4/9 in afternoon). Denies fevers/chills, chest pain, shortness of breath, syncope. She states that last night she had an episode of nausea and retching without vomiting and increasing abdominal pain.  In ED, Hgb 8.5>7.5>7.2, LFTs elevated Tbili 3.3>3.4>2.4, Alk phos 1146, now 1108. CT was unremarkable, GI c/s, EGD  done to evaluate the GJ anastomosis no active bleeding noted, old blood noted, negative for obstruction. Pt was tachycardic on the floor 110s, tmax 100.8, bolused 500cc LR, started IVF, zosyn.     PMH  PAST MEDICAL & SURGICAL HISTORY:  HTN (hypertension)  High cholesterol  Diabetes  COPD (chronic obstructive pulmonary disease)  Kidney stones  No significant past surgical history    Home Meds:  Home Medications:  acetaminophen 325 mg oral tablet: 2 tab(s) orally every 6 hours as needed for  pain (08 Apr 2023 10:59)  Benicar: orally once a day (03 Apr 2023 06:33)  Lipitor: orally once a day (03 Apr 2023 06:33)  metFORMIN:  (03 Apr 2023 06:33)    Allergies  No Known Allergies     24 Hour Events  4/10  -Admission under SICU service    NIGHT  - DVT ppx held   - Hgb stable 7.2 (7.2)  - ambulated in unit   - JANICE output s/s  - K 3.8 - repleated 1 krider   - Mg 4.7 - repleated 2g    [X] A ten-point review of systems was otherwise negative except as noted above.  [  ] Due to altered mental status/intubation, subjective information was not attained from the patient. History was obtained, to the extent possible, from review of the chart and collateral sources of information.    =========================================================================================================================================     vitamin D deficiency: No  History of HCTZ use: No  History of Lithium use: No  History of malabsorption (IBD, Celiac, gastric bypass ): No  History of thyroid disease: Yes: controlled hypothyroidism  Weight history: stable.       Work up:  24 hour urine collection 220 mg calcium  dexa scan 12/2022: lowest Ts core -1.2. no significant change in bone density of the lumbar spine. There has been significant decrease in bone density of the hip.     Latest Reference Range & Units 11/07/22 12:50 11/30/22 11:48 12/12/22 09:54 12/12/22 16:00 05/04/23 13:34   Creatinine 0.51 - 0.95 mg/dL 1.25 (H)  1.10 (H)  1.27 (H)   GFR Estimate >60 mL/min/1.73m2 46 (L)  54 (L)  46 (L)   Calcium 8.8 - 10.2 mg/dL 10.4 (H)  11.1 (H)  10.3 (H)   Phosphorus 2.5 - 4.5 mg/dL  3.0   2.9   25 OH Vit D total 20 - 75 ug/L     <57   Calcium Ionized Whole Blood 4.4 - 5.2 mg/dL 5.5 (H)       Calcium Urine g/24 h 0.10 - 0.30 g/spec    0.22    Calcium Urine mg/dL mg/dL    6.5    Parathyroid Hormone Intact 15 - 65 pg/mL  86 (H)   74 (H)         Social: she is retired . quit smoking longtime ago. Alcohol occasional.  Problem List     Patient Active Problem List   Diagnosis    Hypothyroidism due to acquired atrophy of thyroid    Recurrent major depressive disorder, in full remission (H24)    Endometrioid adenocarcinoma of uterus (H)    Overweight (H)    Stage 3a chronic kidney disease (H)    History of colonic polyps    Right hip pain    Serum calcium elevated    TMJ (temporomandibular joint syndrome)        Medications     Current Outpatient Medications   Medication    Apple Cider Vinegar 600 MG CAPS    CALCIUM PO    cholecalciferol (VITAMIN D3) 5000 units TABS tablet    clobetasol (TEMOVATE) 0.05 % external ointment    Cyanocobalamin 1500 MCG TBDP    FLUoxetine (PROZAC) 20 MG capsule    Lactobacillus (ACIDOPHILUS PO)    levothyroxine (SYNTHROID/LEVOTHROID) 100 MCG tablet    Lutein 20 MG CAPS    Omega 3-6-9 Fatty Acids (OMEGA-3-6-9 PO)     omeprazole (PRILOSEC) 10 MG DR capsule     No current facility-administered medications for this visit.     Facility-Administered Medications Ordered in Other Visits   Medication    sodium chloride (PF) 0.9% PF flush 10 mL        Allergies     No Known Allergies    Medical / Surgical History     Past Medical History:   Diagnosis Date    Cancer (H) August 2019    Uterus removed    Depression     Depressive disorder 1999    taking prosac    Hypothyroidism     Overweight      Past Surgical History:   Procedure Laterality Date    ABDOMEN SURGERY  Oct 2019    Robotic Hysterectomy    BIOPSY  early 2000's    left breast - non event    COLONOSCOPY  within last 6 years ?    OK    COLONOSCOPY N/A 7/26/2021    Procedure: COLONOSCOPY, WITH POLYPECTOMY;  Surgeon: Ryan Butterfield MD;  Location: [facility] OR    DAVINCI HYSTERECTOMY TOTAL, BILATERAL SALPINGO-OOPHORECTOMY, NODE DISSECTION, COMBINED N/A 10/4/2019    Procedure: Robot-assisted total laparoscopic hysterectomy, Bilateral salpingectomy and Right Oophorectomy, Lysis of adhesion, Cervical Indocyanine Green injection, Bilateral sentinel lymph node dissection, Repair of vaginal laceration.;  Surgeon: Perez Reddy MD;  Location: [facility] OR    DILATION AND CURETTAGE N/A 9/12/2019    Procedure: DILATION AND CURETTAGE, UTERUS;  Surgeon: Navdeep Barajas MD;  Location: [facility] OR    OPERATIVE HYSTEROSCOPY WITH MORCELLATOR N/A 9/12/2019    Procedure: HYSTEROSCOPY WITH MORCELLATOR (MYOSURE);  Surgeon: Navdeep Barajas MD;  Location: [facility] OR    SALPINGO OOPHORECTOMY,R/L/SHANDA Left 1980s    Left ovarian with endometriois    US BREAST BIOPSY LT Left        Social History     Social History     Socioeconomic History    Marital status: Single     Spouse name: Not on file    Number of children: Not on file    Years of education: Not on file    Highest education level: Not on file   Occupational History    Occupation: retired     Comment:    Tobacco Use     Smoking status: Former     Packs/day: 1.00     Years: 15.00     Additional pack years: 0.00     Total pack years: 15.00     Types: Cigarettes     Start date: 1972     Quit date: 1987     Years since quittin.0    Smokeless tobacco: Never   Vaping Use    Vaping Use: Never used   Substance and Sexual Activity    Alcohol use: Yes     Comment: a beer here and there - never more than 2 -  2 times/week    Drug use: Never    Sexual activity: Not Currently     Partners: Female     Birth control/protection: None   Other Topics Concern    Parent/sibling w/ CABG, MI or angioplasty before 65F 55M? Yes   Social History Narrative    Not on file     Social Determinants of Health     Financial Resource Strain: Low Risk  (2023)    Financial Resource Strain     Within the past 12 months, have you or your family members you live with been unable to get utilities (heat, electricity) when it was really needed?: No   Food Insecurity: Low Risk  (2023)    Food Insecurity     Within the past 12 months, did you worry that your food would run out before you got money to buy more?: No     Within the past 12 months, did the food you bought just not last and you didn t have money to get more?: No   Transportation Needs: Low Risk  (2023)    Transportation Needs     Within the past 12 months, has lack of transportation kept you from medical appointments, getting your medicines, non-medical meetings or appointments, work, or from getting things that you need?: No   Physical Activity: Not on file   Stress: Not on file   Social Connections: Not on file   Interpersonal Safety: Low Risk  (2023)    Interpersonal Safety     Do you feel physically and emotionally safe where you currently live?: Yes     Within the past 12 months, have you been hit, slapped, kicked or otherwise physically hurt by someone?: No     Within the past 12 months, have you been humiliated or emotionally abused in other ways by your partner or  JEREMIAS IVEY     63y Female    MRN-[id number]    Admit Date: 04-09-23  Hospital LOS: 1d  ICU Admission Date: 4/10/23  OR #1-        ============================  HPI   63y F  POD #8 s/p Pancreaticoduodenectomy on 4/3/2023 and d/c on 4/8, presented for lightheadedness and recent episodes of melanotic stools (last 4/9 in afternoon). Denies fevers/chills, chest pain, shortness of breath, syncope. She states that before admission she had an episode of nausea and retching without vomiting and increasing abdominal pain.  In ED, Hgb remains at 7.2, LFTs elevated Tbili 3.3 now 1.2, Alk phos 1146, now 952. CT was unremarkable, GI c/s, EGD done 4/10 to evaluate the GJ anastomosis no active bleeding noted, old blood noted, negative for obstruction. Pt was tachycardic on the floor 110s, tmax 100.8, bolused 500cc LR, started IVF, zosyn given c/f cholangitis.     PMH  PAST MEDICAL & SURGICAL HISTORY:  HTN (hypertension)  High cholesterol  Diabetes  COPD (chronic obstructive pulmonary disease)  Kidney stones  No significant past surgical history    Home Meds:  Home Medications:  acetaminophen 325 mg oral tablet: 2 tab(s) orally every 6 hours as needed for  pain (08 Apr 2023 10:59)  Benicar: orally once a day (03 Apr 2023 06:33)  Lipitor: orally once a day (03 Apr 2023 06:33)  metFORMIN:  (03 Apr 2023 06:33)    Allergies  No Known Allergies     24 Hour Events  4/10  -Admission under SICU service    NIGHT  - DVT ppx held   - Hgb stable 7.2 (7.2)  - ambulated in unit   - JANICE output s/s  - K 3.8 - repleated 1 krider   - Mg 4.7 - repleated 2g    [X] A ten-point review of systems was otherwise negative except as noted above.  [  ] Due to altered mental status/intubation, subjective information was not attained from the patient. History was obtained, to the extent possible, from review of the chart and collateral sources of information.    =========================================================================================================================================     ex-partner?: No   Housing Stability: Low Risk  (9/24/2023)    Housing Stability     Do you have housing? : Yes     Are you worried about losing your housing?: No       Family History     Family History   Problem Relation Age of Onset    Acute myelogenous leukemia Mother     Coronary Artery Disease Mother         bypass surgery 1989    Osteoporosis Mother     Obesity Mother     Parkinsonism Father     Prostate Cancer Father         Diagnosed late in life in his 80's    Obesity Sister     Heart Disease Sister     Coronary Artery Disease Sister         heart attacK in June of 2019    Uterine Cancer Maternal Grandmother 40        Uterine versus cervical    No Known Problems Sister     Leukemia Brother     Leukemia Nephew     Coronary Artery Disease Brother         Carotid Artery Surgery    Obesity Brother     Substance Abuse Brother         alcohol       ROS     12 ROS completed, pertinent positive and negative in HPI          Physical Exam   LMP  (LMP Unknown)    GENERAL: Healthy, alert and no distress  EYES: Eyes grossly normal to inspection.  No discharge or erythema, or obvious scleral/conjunctival abnormalities.  RESP: No audible wheeze, cough, or visible cyanosis.  No visible retractions or increased work of breathing.    SKIN: Visible skin clear. No significant rash, abnormal pigmentation or lesions.  NEURO: Cranial nerves grossly intact.  Mentation and speech appropriate for age.  PSYCH: Mentation appears normal, affect normal/bright, judgement and insight intact, normal speech and appearance well-groomed.     Labs/Imaging     Pertinent Labs were reviewed and updated in EPIC and discussed briefly.  Radiology Results were  reviewed and updated in EPIC and discussed briefly.    Summary of recent findings:   No results found for: A1C    TSH   Date Value Ref Range Status   10/26/2023 1.60 0.30 - 4.20 uIU/mL Final   11/07/2022 1.03 0.40 - 4.00 mU/L Final   06/25/2021 1.51 0.40 - 4.00 mU/L Final   07/17/2020 0.84 0.40  "- 4.00 mU/L Final   07/19/2019 1.24 0.40 - 4.00 mU/L Final   05/04/2017 0.97 0.30 - 4.50 uIU/mL Final       Creatinine   Date Value Ref Range Status   11/16/2023 1.00 (H) 0.51 - 0.95 mg/dL Final   06/25/2021 1.04 0.52 - 1.04 mg/dL Final       Recent Labs   Lab Test 09/13/22  1553 06/25/21  0715   CHOL 206* 240*   HDL 46* 40*   * 177*   TRIG 86 113       No results found for: \"[id number]\", \"[id number]\", \"[id number]\"    I personally reviewed the patient's outside records from [facility] EMR and Care Everywhere. Summary of pertinent findings in HPI.    Impression / Plan     1. Hypercalcemia.   2. Elevated PTH  3. CKD 3a  With elevated PTH, the most likely diagnosis is primary hyperparathyroidism.  Her 24 hr urine collection for calcium was 220 mg, with an intermediate ratio. It was done while on very low calcium intake which would explain the results.      We again dicussed today the latest guidelines conclude that surgery for hyperparathyroidism is indicated in symptomatic patients, or in asymptomatic patients who meet any one of the following conditions:  Serum calcium concentration of 1.0 mg/dL (0.25 mmol/L) or more above the upper limit of normal   Creatnine clearance reduced to < 60 mL/min, or 24-hour urine for calcium > 400 mg/day and increased stone risk by biochemical stone risk analysis, or nephrolithiasis or nephrocalcinosis on imaging studyCreatinine clearance that is reduced to <60 mL/min   Bone density at the hip, lumbar spine, or distal radius that is more than 2.5 standard deviations below peak bone mass (T score <-2.5) and/or previous fragility fracture   Age less than 50 years  Operative intervention can be delayed in patients over 50 years of age who are asymptomatic or minimally symptomatic and who have serum calcium concentrations <1.0 mg/dL (0.2 mmol/L) above the upper limit of normal, and in patients who are medically unfit for surgery.        Her CKD is of unclear etiology started since at " least 2008, she established with nephrology 6/2023 seen by Dr. Thomas. Her CKD is not related to current problems with elevated calcium dn PTH. We discussed that ongoing primary hyperparathyroidism could increase the risk of GFR decline in the future; at this point now it seems to be steady around 45-55.   She had one level of calcium of 11.1 which came down to 10.4, has been ranging 10.3-10.9.   She really does not have clear cut indication for surgery now. We discussed monitoring with close labs [id number].    As far as calcium intake, even in cases of hyperparathyroidism, it is recommended to keep up calcium intake to about 1000 mg daily, to prevent further increase in PTH and bone disease. I advised that to the patient.    Dexa scan every 2 years, next 12/2024.    Test and/or medications prescribed today:  No orders of the defined types were placed in this encounter.        Follow up: PRN. She is ok following with her PCP for lab monitoring and dexa scan scans.       Felice Madera MD  Endocrinology, Diabetes and Metabolism  [facility]

## 2023-12-12 ENCOUNTER — RESULT REVIEW (OUTPATIENT)
Age: 63
End: 2023-12-12

## 2023-12-12 ENCOUNTER — OUTPATIENT (OUTPATIENT)
Dept: OUTPATIENT SERVICES | Facility: HOSPITAL | Age: 63
LOS: 1 days | End: 2023-12-12
Payer: MEDICAID

## 2023-12-12 DIAGNOSIS — Z90.49 ACQUIRED ABSENCE OF OTHER SPECIFIED PARTS OF DIGESTIVE TRACT: Chronic | ICD-10-CM

## 2023-12-12 DIAGNOSIS — Z00.8 ENCOUNTER FOR OTHER GENERAL EXAMINATION: ICD-10-CM

## 2023-12-12 DIAGNOSIS — K75.0 ABSCESS OF LIVER: ICD-10-CM

## 2023-12-12 PROCEDURE — 74177 CT ABD & PELVIS W/CONTRAST: CPT

## 2023-12-12 PROCEDURE — 74177 CT ABD & PELVIS W/CONTRAST: CPT | Mod: 26

## 2023-12-13 DIAGNOSIS — K75.0 ABSCESS OF LIVER: ICD-10-CM

## 2024-01-17 ENCOUNTER — APPOINTMENT (OUTPATIENT)
Dept: SURGERY | Facility: CLINIC | Age: 64
End: 2024-01-17
Payer: MEDICAID

## 2024-01-17 VITALS
HEIGHT: 63 IN | WEIGHT: 148 LBS | OXYGEN SATURATION: 97 % | BODY MASS INDEX: 26.22 KG/M2 | HEART RATE: 83 BPM | DIASTOLIC BLOOD PRESSURE: 76 MMHG | TEMPERATURE: 97.1 F | SYSTOLIC BLOOD PRESSURE: 110 MMHG

## 2024-01-17 DIAGNOSIS — K86.89 OTHER SPECIFIED DISEASES OF PANCREAS: ICD-10-CM

## 2024-01-17 LAB
ALBUMIN SERPL ELPH-MCNC: 4.8 G/DL
ALP BLD-CCNC: 108 U/L
ALT SERPL-CCNC: 59 U/L
ANION GAP SERPL CALC-SCNC: 15 MMOL/L
AST SERPL-CCNC: 30 U/L
BILIRUB DIRECT SERPL-MCNC: <0.2 MG/DL
BILIRUB INDIRECT SERPL-MCNC: >0.6 MG/DL
BILIRUB SERPL-MCNC: 0.8 MG/DL
BUN SERPL-MCNC: 16 MG/DL
CALCIUM SERPL-MCNC: 10.3 MG/DL
CHLORIDE SERPL-SCNC: 103 MMOL/L
CO2 SERPL-SCNC: 23 MMOL/L
CREAT SERPL-MCNC: 0.8 MG/DL
EGFR: 83 ML/MIN/1.73M2
GLUCOSE SERPL-MCNC: 120 MG/DL
HCT VFR BLD CALC: 38.9 %
HGB BLD-MCNC: 12.9 G/DL
MCHC RBC-ENTMCNC: 30.6 PG
MCHC RBC-ENTMCNC: 33.2 G/DL
MCV RBC AUTO: 92.4 FL
PLATELET # BLD AUTO: 233 K/UL
PMV BLD: 9.6 FL
POTASSIUM SERPL-SCNC: 4.9 MMOL/L
PROT SERPL-MCNC: 7.9 G/DL
RBC # BLD: 4.21 M/UL
RBC # FLD: 11.1 %
SODIUM SERPL-SCNC: 141 MMOL/L
WBC # FLD AUTO: 4.8 K/UL

## 2024-01-17 PROCEDURE — 99215 OFFICE O/P EST HI 40 MIN: CPT

## 2024-01-18 ENCOUNTER — APPOINTMENT (OUTPATIENT)
Dept: GASTROENTEROLOGY | Facility: CLINIC | Age: 64
End: 2024-01-18
Payer: MEDICAID

## 2024-01-18 DIAGNOSIS — K31.84 GASTROPARESIS: ICD-10-CM

## 2024-01-18 DIAGNOSIS — K86.89 OTHER SPECIFIED DISEASES OF PANCREAS: ICD-10-CM

## 2024-01-18 DIAGNOSIS — Z90.410 ACQUIRED TOTAL ABSENCE OF PANCREAS: ICD-10-CM

## 2024-01-18 DIAGNOSIS — K75.0 ABSCESS OF LIVER: ICD-10-CM

## 2024-01-18 DIAGNOSIS — Z90.49 ACQUIRED TOTAL ABSENCE OF PANCREAS: ICD-10-CM

## 2024-01-18 DIAGNOSIS — D49.0 NEOPLASM OF UNSPECIFIED BEHAVIOR OF DIGESTIVE SYSTEM: ICD-10-CM

## 2024-01-18 LAB
CANCER AG19-9 SERPL-ACNC: 16 U/ML
CEA SERPL-MCNC: 1.6 NG/ML
ESTIMATED AVERAGE GLUCOSE: 166 MG/DL
HBA1C MFR BLD HPLC: 7.4 %
LPL SERPL-CCNC: 11 U/L

## 2024-01-18 PROCEDURE — 99214 OFFICE O/P EST MOD 30 MIN: CPT | Mod: 95

## 2024-01-18 NOTE — PHYSICAL EXAM
[Normal] : alert, normal voice/communication, healthy appearing, no acute distress [Sclera] : the sclera and conjunctiva were normal [Hearing Threshold Finger Rub Not [place]] : hearing was normal [Normal Appearance] : the appearance of the neck was normal [No Respiratory Distress] : no respiratory distress [No Acc Muscle Use] : no accessory muscle use [Respiration, Rhythm And Depth] : normal respiratory rhythm and effort [Normal Color / Pigmentation] : normal skin color and pigmentation [Cranial Nerves Intact] : cranial nerves 2-12 were intact [Oriented To Time, Place, And Person] : oriented to person, place, and time

## 2024-01-18 NOTE — ASSESSMENT
[FreeTextEntry1] : 63 y.o F w/ hx HTN, HLD, DM, and hx of MD-IPMN s/p pylorus-preserving Pancreaticoduodenectomy (Whipple) on 4/3/2023 (6 mm solid nodule, low-grade intestinal type w/ no HGD or malignancy), UGIB 2/2 marginal ulcer, liver abscesses s/p 4-week Augmentin course in 5/2023 now resolved, who is called for a f/u visit.  #MD-IPMN s/p pylorus-preserving Pancreaticoduodenectomy #UGIB 2/2 marginal ulcers - resolved #abnormal CT w/ liver collection suspicious for abscess and peripancreatic collection - s/p augmentin x 4 w w/ resolution #CT w/ HJ stent is still in place #abd pain - post-op + ? gastroparesis -> improving  overall significantly improved clinically No active complaints  -continue current diet w/ small frequent meals and advance diet consistency as tolerated -Pantoprazole 40 mg daily -reviewed labs -enteroscopy to remove biliary stent -Last colonoscopy 2 years ago with outside primary GI.  She was told to repeat in 3 years.  Will discuss at next visit. -f/u in 3 mo.

## 2024-01-18 NOTE — HISTORY OF PRESENT ILLNESS
[Home] : at home, [unfilled] , at the time of the visit. [Medical Office: ([facility])___] : at the medical office located in  [Verbal consent obtained from patient] : the patient, [unfilled] [FreeTextEntry4] : SANIA IBANEZ [FreeTextEntry1] : LAST VISIT - 9/14/23  63 y.o F w/ hx HTN, HLD, DM, and hx of MD-IPMN s/p pylorus-preserving Pancreaticoduodenectomy (Whipple) on 4/3/2023 (6 mm solid nodule, low-grade intestinal type w/ no HGD or malignancy), UGIB 2/2 marginal ulcer, liver abscesses s/p 4-week Augmentin course in 5/2023 now resolved, who is called for a f/u visit.  She had her Whipple on 4/3/23 and had an uncomplicated course after and was discharged on 4/8/23. She was readmitted from 4/9/23 to 4/17/23 after her surgery for pre-syncope and reported episodes of black stools, and was evaluated during her stay for UGIB. she also had jaundice and transaminitis. She had EGD/enteroscopy on 4/10/23 showing a 3 cm marginal ulcer as well as multiple small <1 cm antral ulcers.  Her other complaint was food intolerance and epigastric and RUQ pain mainly after meals and at nighttime. she had decreased PO intake and was only able to tolerate liquids for a while. she lost 20 lbs in the post-op phase. She was treated conservatively and discharged after her Hb was stable and symptoms improved. she had repeat enteroscopy on 4/26/23 which showed healing of marginal ulcer and we removed a dislodged stent from the afferent limb.  At her last visit in September, she was doing well and had no complaints back then.  She still doing well now with no new complaints since then.  She saw Dr. Blanco yesterday. She is tolerating regular diet without abdominal pain, nausea or vomiting, but reports bloating. Her weight is stable and she denies any unintentional weight loss. She denies any change in bowel movements and has no diarrhea or constipation. She has 1 bowel movement daily.  She is following with her PCP and is taking metformin for new DM after her surgery. Last CT from December 2023 revealed resolution of the hepatic cyst but showed that the biliary stent still in place.  ROS otherwise negative.  -----------------------------------------------  PMHx: HTN, DM, DL, MD-IPMN PSHx: Whipple surgery Fam hx: no fam hx of GI malignancy Social hx: neg x 3 Allergies: NKDA  -----------------------------------------------  EGD w/ enteroscopy on 4/10/23: anatomy of Pylorus-preserving Whipple surgery. -Multiple small (<1 cm) clean based ulcers in the antrum. -A marginal, circumferential anastomotic ulcer measuring around 3 cm at the duodeno-jejunotomy with surrounding edema, stigmata of recent bleeding, and visible surgical sutures. There was no evidence of active bleeding. The efferent limb was examined and had no obstruction but had multiple small (< 1 cm) clean based ulcers.  Enteroscopy 4/26/23: -Evidence of a previous pylorus-preserving Whipple surgery with evidence of a duodeno-jejunotomy. The previously seen ulcers in the stomach have healed. -Food in the fundus suggestive of gastroparesis -hiatal hernia -A marginal, circumferential anastomotic ulcer measuring around 2-3 cm was seen at the duodeno-jejunotomy with mild surrounding edema, and visible surgical sutures seen. The ulcer had partially healed as compared to the prior procedure. There was no evidence of active bleeding. The efferent limb was examined and had no obstruction. The PCF colonoscope was advanced into the afferent limb revealing a dislodged stent that was removed using a rat tooth forceps. The hepaticojejunal and pancreaticojejunal anastomosis could not be seen due to excessive looping.  -----------------------------------------------  CT 4/9/23 w/ IV contrast: No evidence of acute abdominal pathology. Post pancreaticoduodenectomy with expected recent postoperative change.  CT 5/11/23 w/ IV contrast: 1. Since April 9, 2023, new heterogeneous 4.0 cm lesion along the right hepatic dome, with central area of hypoattenuation, suspicious for abscess; an additional new subcentimeter hypodensity along the right hepatic dome is too small to further characterize. 2. New 4.2 cm thick-walled fluid collection just anterior to the pancreatic body and inseparable from the adjacent gastric antrum and transverse colon; additional scattered areas of ascites are noted, predominantly within the right upper quadrant 3. Status post prior pancreaticoduodenectomy with choledochojejunostomy stent in place; interval removal of previously noted right upper quadrant JANICE drain and pancreaticojejunostomy stent.  12/12/2023 CT abdomen and pelvis with IV and oral contrast:  Choledochojejunostomy stent in unchanged position and unremarkable appearance of the pancreas. 1. Since May 11, 2023, no new hepatic lesion; interval near-complete resolution of previously noted abscess along the right hepatic dome, with only minimal residual right hepatic hypodensity, and substantial interval decrease in previously noted curvilinear hypodensity along the inferior aspect of the right hepatic lobe. 2. Status post prior Whipple procedure, with a choledochojejunostomy stent in unchanged position and an unremarkable appearance of the distal pancreas. 3. Interval resolution of previously noted fluid collection just anterior to the pancreatic body, with only minimal residual inflammatory changes of the root of the small bowel mesentery. [de-identified] : 12/12/23

## 2024-01-19 NOTE — HISTORY OF PRESENT ILLNESS
[de-identified] : Ms. JEREMIAS IVEY is a 62 year  year old woman. She presented to the office for the first time on 02/03/2023 , her primary is Doesnt have PCP .  She had some pain, was worked up outpt in [place] and then had and EUS guided biopsy of the pancreas for a small mass in the the genu or the pancreas. the PD is dilated proximally  We do not have have any imaging to review. Biopsy had atypical cells.  2/27/23 ; pmd phelix estes [id number] CT shows a smal complex mass in the head / uncinate as well as the neck  sma smv looks good.  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  Ct shows a liver abcess and a peripancreatic collection.  HJ stent is still in place 5/24 : she is doing well repeat labs still shows improvement GI Started on abx 6/21 : continues to improve minimal discomfort after eating hot or cold food normal well formed brown stools no abx for a month  1/17/24: She is doing well. She has gained some weight. She is able to perform activities of daily living without difficulty. She reports normal appetite and bowel movements. Denies blood in the stool or light-colored stools. Will re-check labs. Reviewed recent CT A/P from December. She has 1 more stent that needs to be removed. She is going back to [place] in March and will be there for 6 months.

## 2024-01-19 NOTE — DATA REVIEWED
[FreeTextEntry1] : Reviewed prior office notes, operative note, recent CT A/P, and pathology report.

## 2024-01-19 NOTE — ASSESSMENT
[FreeTextEntry1] : Ms. JEREMIAS IVEY is a 62 year  year old woman. She presented to the office for the first time on 02/03/2023 , her primary is Doesnt have PCP .  She had some pain, was worked up outpt in [place] and then had and EUS guided biopsy of the pancreas for a small mass in the the genu or the pancreas. the PD is dilated proximally  We do not have have any imaging to review. Biopsy had atypical cells.  2/27/23 ; pmd phelisilviano estes [id number] CT shows a smal complex mass in the head / uncinate as well as the neck  sma smv looks good.  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  Ct shows a liver abcess and a peripancreatic collection.  HJ stent is still in place 5/24 : she is doing well repeat labs still shows improvement GI Started on abx 6/21 : continues to improve minimal discomfort after eating hot or cold food normal well formed brown stools no abx for a month  1/17/24: She is doing well. She has gained some weight. She is able to perform activities of daily living without difficulty. She reports normal appetite and bowel movements. Denies blood in the stool or light-colored stools. Will re-check labs to monitor for malignancy. Reviewed recent CT A/P from December. She has 1 more stent that needs to be removed. Discussed with Dr. Charlton. She is going back to [place] in March and will be there for 6 months. She will get stent removed with Dr. Charlton before they go. Will repeat CT A/P next visit. Follow up upon their return. Will call them with results.   Impression: Doing well s/p pancreaticio duodnenctomy Main Duct IPMN   will get repeat labs recent image done - whas HJ sten t - refer to GI for removal       We explained in great detail the pathophysiology of the disease process. We reyna diagrams and discussed the workup for diagnosis and management.  The various options were explained to the patient. The Risk , benefit and alternatives were discussed. We discussed recovery and possible complications.  The Post operative care was explained to the patient. She was counselled on diet , exercise and wound care.  We discussed the pathology and surgery with her.    We discussed for over 60 min.  We discussed the importance of close follow up.  We informed that she needs to follow up in 1 year  We also informed that she can call us if anything changes or has any questions.

## 2024-01-19 NOTE — PHYSICAL EXAM
[Alert] : alert [Calm] : calm [JVD] : no jugular venous distention  [Purpura] : no purpura  [de-identified] : Normal  [de-identified] : Normal [de-identified] : Normal  Healing transverse scar

## 2024-01-30 ENCOUNTER — NON-APPOINTMENT (OUTPATIENT)
Age: 64
End: 2024-01-30

## 2024-02-16 ENCOUNTER — TRANSCRIPTION ENCOUNTER (OUTPATIENT)
Age: 64
End: 2024-02-16

## 2024-02-16 ENCOUNTER — RESULT REVIEW (OUTPATIENT)
Age: 64
End: 2024-02-16

## 2024-02-16 ENCOUNTER — OUTPATIENT (OUTPATIENT)
Dept: OUTPATIENT SERVICES | Facility: HOSPITAL | Age: 64
LOS: 1 days | Discharge: HOME | End: 2024-02-16
Payer: COMMERCIAL

## 2024-02-16 VITALS
SYSTOLIC BLOOD PRESSURE: 172 MMHG | HEART RATE: 70 BPM | WEIGHT: 145.95 LBS | RESPIRATION RATE: 18 BRPM | DIASTOLIC BLOOD PRESSURE: 83 MMHG | TEMPERATURE: 97 F

## 2024-02-16 VITALS
SYSTOLIC BLOOD PRESSURE: 160 MMHG | RESPIRATION RATE: 17 BRPM | OXYGEN SATURATION: 95 % | HEART RATE: 61 BPM | DIASTOLIC BLOOD PRESSURE: 77 MMHG

## 2024-02-16 DIAGNOSIS — Z90.49 ACQUIRED ABSENCE OF OTHER SPECIFIED PARTS OF DIGESTIVE TRACT: Chronic | ICD-10-CM

## 2024-02-16 DIAGNOSIS — K31.84 GASTROPARESIS: ICD-10-CM

## 2024-02-16 DIAGNOSIS — K86.89 OTHER SPECIFIED DISEASES OF PANCREAS: ICD-10-CM

## 2024-02-16 PROCEDURE — 88305 TISSUE EXAM BY PATHOLOGIST: CPT

## 2024-02-16 PROCEDURE — 43251 EGD REMOVE LESION SNARE: CPT

## 2024-02-16 PROCEDURE — 43247 EGD REMOVE FOREIGN BODY: CPT | Mod: XU

## 2024-02-16 PROCEDURE — C1889: CPT

## 2024-02-16 PROCEDURE — 88305 TISSUE EXAM BY PATHOLOGIST: CPT | Mod: 26

## 2024-02-16 RX ORDER — METFORMIN HYDROCHLORIDE 850 MG/1
0 TABLET ORAL
Qty: 0 | Refills: 0 | DISCHARGE

## 2024-02-16 RX ORDER — OLMESARTAN MEDOXOMIL 5 MG/1
0 TABLET, FILM COATED ORAL
Qty: 0 | Refills: 0 | DISCHARGE

## 2024-02-16 RX ORDER — ATORVASTATIN CALCIUM 80 MG/1
0 TABLET, FILM COATED ORAL
Qty: 0 | Refills: 0 | DISCHARGE

## 2024-02-16 NOTE — ASU PATIENT PROFILE, ADULT - TEACHING/LEARNING CULTURAL CONSIDERATIONS
Pt states she is not allergic to Flonase; she states she takes it almost everyday and has no allergic reaction to medication.     none

## 2024-02-16 NOTE — CHART NOTE - NSCHARTNOTEFT_GEN_A_CORE
PACU ANESTHESIA ADMISSION NOTE      Procedure:   Post op diagnosis:      ____  Intubated  TV:______       Rate: ______      FiO2: ______    _x___  Patent Airway    _x___  Full return of protective reflexes    _x___  Full recovery from anesthesia / back to baseline status    Vitals  SPO2:-98  HR:-77  RR:-11  B.P:-143/78  TEMP:-98    Mental Status:  _x___ Awake   ___x_ Alert   _____ Drowsy   _____ Sedated    Nausea/Vomiting:  _x___  NO       ______Yes,   See Post - Op Orders         Pain Scale (0-10):  __0___    Treatment: _x___ None    __x__ See Post - Op/PCA Orders    Post - Operative Fluids:   ___ Oral   ____x See Post - Op Orders    Plan: Discharge:   _x___Home       ___Floor     _____Critical Care    _____  Other:_________________    Comments:  Report endorsed to RN in pacu  Vitals stable  No anesthesia issues or complications noted.  Discharge to patient to  home when criteria met.

## 2024-02-21 LAB — SURGICAL PATHOLOGY STUDY: SIGNIFICANT CHANGE UP

## 2024-02-22 DIAGNOSIS — D13.0 BENIGN NEOPLASM OF ESOPHAGUS: ICD-10-CM

## 2024-02-22 DIAGNOSIS — E11.9 TYPE 2 DIABETES MELLITUS WITHOUT COMPLICATIONS: ICD-10-CM

## 2024-02-22 DIAGNOSIS — J44.9 CHRONIC OBSTRUCTIVE PULMONARY DISEASE, UNSPECIFIED: ICD-10-CM

## 2024-02-22 DIAGNOSIS — I10 ESSENTIAL (PRIMARY) HYPERTENSION: ICD-10-CM

## 2024-02-22 DIAGNOSIS — E78.00 PURE HYPERCHOLESTEROLEMIA, UNSPECIFIED: ICD-10-CM

## 2024-02-22 DIAGNOSIS — Z90.49 ACQUIRED ABSENCE OF OTHER SPECIFIED PARTS OF DIGESTIVE TRACT: ICD-10-CM

## 2024-02-22 DIAGNOSIS — Z79.84 LONG TERM (CURRENT) USE OF ORAL HYPOGLYCEMIC DRUGS: ICD-10-CM

## 2024-02-22 DIAGNOSIS — T18.3XXA FOREIGN BODY IN SMALL INTESTINE, INITIAL ENCOUNTER: ICD-10-CM

## 2024-03-15 NOTE — PRE-OP CHECKLIST - ALLERGIES REVIEWED
No care due was identified.  [facility] Embedded Care Due Messages. Reference number: [id number].   3/14/2024 8:12:11 PM CDT   done

## 2024-05-31 ENCOUNTER — APPOINTMENT (OUTPATIENT)
Dept: GASTROENTEROLOGY | Facility: CLINIC | Age: 64
End: 2024-05-31

## 2024-10-16 ENCOUNTER — APPOINTMENT (OUTPATIENT)
Dept: SURGERY | Facility: CLINIC | Age: 64
End: 2024-10-16
Payer: MEDICAID

## 2024-10-16 DIAGNOSIS — K86.89 OTHER SPECIFIED DISEASES OF PANCREAS: ICD-10-CM

## 2024-10-16 DIAGNOSIS — D49.0 NEOPLASM OF UNSPECIFIED BEHAVIOR OF DIGESTIVE SYSTEM: ICD-10-CM

## 2024-10-16 LAB
ALBUMIN SERPL ELPH-MCNC: 5.1 G/DL
ALP BLD-CCNC: 77 U/L
ALT SERPL-CCNC: 25 U/L
ANION GAP SERPL CALC-SCNC: 11 MMOL/L
AST SERPL-CCNC: 20 U/L
BASOPHILS # BLD AUTO: 0.03 K/UL
BASOPHILS NFR BLD AUTO: 0.7 %
BILIRUB DIRECT SERPL-MCNC: 0.2 MG/DL
BILIRUB INDIRECT SERPL-MCNC: 0.4 MG/DL
BILIRUB SERPL-MCNC: 0.6 MG/DL
BUN SERPL-MCNC: 18 MG/DL
CALCIUM SERPL-MCNC: 10.9 MG/DL
CHLORIDE SERPL-SCNC: 105 MMOL/L
CO2 SERPL-SCNC: 24 MMOL/L
CREAT SERPL-MCNC: 0.9 MG/DL
EGFR: 72 ML/MIN/1.73M2
EOSINOPHIL # BLD AUTO: 0.14 K/UL
EOSINOPHIL NFR BLD AUTO: 3.1 %
GLUCOSE SERPL-MCNC: 145 MG/DL
HCT VFR BLD CALC: 40 %
HGB BLD-MCNC: 13.7 G/DL
IMM GRANULOCYTES NFR BLD AUTO: 0.2 %
LYMPHOCYTES # BLD AUTO: 1.94 K/UL
LYMPHOCYTES NFR BLD AUTO: 43.4 %
MAN DIFF?: NORMAL
MCHC RBC-ENTMCNC: 31.5 PG
MCHC RBC-ENTMCNC: 34.3 G/DL
MCV RBC AUTO: 92 FL
MONOCYTES # BLD AUTO: 0.22 K/UL
MONOCYTES NFR BLD AUTO: 4.9 %
NEUTROPHILS # BLD AUTO: 2.13 K/UL
NEUTROPHILS NFR BLD AUTO: 47.7 %
PLATELET # BLD AUTO: 227 K/UL
PMV BLD AUTO: 0 /100 WBCS
POTASSIUM SERPL-SCNC: 5.2 MMOL/L
PROT SERPL-MCNC: 7.6 G/DL
RBC # BLD: 4.35 M/UL
RBC # FLD: 11.2 %
SODIUM SERPL-SCNC: 140 MMOL/L
WBC # FLD AUTO: 4.47 K/UL

## 2024-10-16 PROCEDURE — 99212 OFFICE O/P EST SF 10 MIN: CPT

## 2025-03-24 NOTE — ED PROVIDER NOTE - EKG ADDITIONAL INFORMATION FREE TEXT
membranes are moist.  Pupils are equal, round, and reactive to light. Extraocular muscles are intact.  Sclerae anicteric. Neck supple without JVD.   Lymph node   deferred   Skin Warm and dry.  No bruising and no rash noted.  No erythema.  No pallor.    Respiratory Unlabored respiratory effort on RA.    CVS Normal rate, regular rhythm. Normotensive.   Abdomen Soft, nontender and nondistended, normoactive bowel sounds.  No palpable mass.  No hepatosplenomegaly.   Neuro Grossly nonfocal with no obvious sensory or motor deficits.   MSK Normal range of motion in general.  No edema and no tenderness.   Psych Appropriate mood and affect. Pleasant.       Labs:  Recent Results (from the past 24 hours)   TSH without Reflex    Collection Time: 03/24/25 10:33 AM   Result Value Ref Range    TSH, 3rd Generation 5.760 (H) 0.270 - 4.200 uIU/mL   Comprehensive metabolic panel    Collection Time: 03/24/25 10:33 AM   Result Value Ref Range    Sodium 139 136 - 145 mmol/L    Potassium 3.8 3.5 - 5.1 mmol/L    Chloride 104 98 - 107 mmol/L    CO2 25 20 - 29 mmol/L    Anion Gap 10 7 - 16 mmol/L    Glucose 96 70 - 99 mg/dL    BUN 23 8 - 23 MG/DL    Creatinine 1.42 (H) 0.60 - 1.10 MG/DL    Est, Glom Filt Rate 40 (L) >60 ml/min/1.73m2    Calcium 9.8 8.8 - 10.2 MG/DL    Total Bilirubin <0.2 0.0 - 1.2 MG/DL    ALT 11 8 - 45 U/L    AST 20 15 - 37 U/L    Alk Phosphatase 64 35 - 104 U/L    Total Protein 6.7 6.3 - 8.2 g/dL    Albumin 3.3 3.2 - 4.6 g/dL    Globulin 3.3 2.3 - 3.5 g/dL    Albumin/Globulin Ratio 1.0 1.0 - 1.9     CBC With Auto Differential    Collection Time: 03/24/25 10:33 AM   Result Value Ref Range    WBC 2.4 (L) 4.3 - 11.1 K/uL    RBC 3.78 (L) 4.05 - 5.2 M/uL    Hemoglobin 10.6 (L) 11.7 - 15.4 g/dL    Hematocrit 32.7 (L) 35.8 - 46.3 %    MCV 86.5 82.0 - 102.0 FL    MCH 28.0 26.1 - 32.9 PG    MCHC 32.4 31.4 - 35.0 g/dL    RDW 12.5 11.9 - 14.6 %    Platelets 253 150 - 450 K/uL    MPV 8.5 (L) 9.4 - 12.3 FL    nRBC 0.00 0.0 - 0.2 K/uL  NSR, no caro or depressions